# Patient Record
Sex: MALE | Race: WHITE | NOT HISPANIC OR LATINO | URBAN - METROPOLITAN AREA
[De-identification: names, ages, dates, MRNs, and addresses within clinical notes are randomized per-mention and may not be internally consistent; named-entity substitution may affect disease eponyms.]

---

## 2024-06-05 ENCOUNTER — APPOINTMENT (OUTPATIENT)
Dept: RADIOLOGY | Facility: MEDICAL CENTER | Age: 39
DRG: 519 | End: 2024-06-05
Attending: EMERGENCY MEDICINE
Payer: COMMERCIAL

## 2024-06-05 ENCOUNTER — APPOINTMENT (OUTPATIENT)
Dept: RADIOLOGY | Facility: MEDICAL CENTER | Age: 39
DRG: 519 | End: 2024-06-05
Attending: NEUROLOGICAL SURGERY
Payer: COMMERCIAL

## 2024-06-05 ENCOUNTER — HOSPITAL ENCOUNTER (INPATIENT)
Facility: MEDICAL CENTER | Age: 39
LOS: 10 days | DRG: 519 | End: 2024-06-15
Attending: EMERGENCY MEDICINE | Admitting: SURGERY
Payer: COMMERCIAL

## 2024-06-05 DIAGNOSIS — S12.600A CLOSED DISPLACED FRACTURE OF SEVENTH CERVICAL VERTEBRA, UNSPECIFIED FRACTURE MORPHOLOGY, INITIAL ENCOUNTER (HCC): ICD-10-CM

## 2024-06-05 DIAGNOSIS — S09.90XA CLOSED HEAD INJURY, INITIAL ENCOUNTER: ICD-10-CM

## 2024-06-05 DIAGNOSIS — T14.90XA TRAUMA: ICD-10-CM

## 2024-06-05 DIAGNOSIS — S01.01XA SCALP LACERATION, INITIAL ENCOUNTER: ICD-10-CM

## 2024-06-05 DIAGNOSIS — I82.402 ACUTE DEEP VEIN THROMBOSIS (DVT) OF LEFT LOWER EXTREMITY, UNSPECIFIED VEIN (HCC): ICD-10-CM

## 2024-06-05 PROBLEM — S22.41XA FRACTURE THREE RIBS-CLOSED, RIGHT, INITIAL ENCOUNTER: Status: ACTIVE | Noted: 2024-06-05

## 2024-06-05 PROBLEM — Z53.09 CONTRAINDICATION TO DEEP VEIN THROMBOSIS (DVT) PROPHYLAXIS: Status: ACTIVE | Noted: 2024-06-05

## 2024-06-05 PROBLEM — S01.91XA LACERATION OF HEAD: Status: ACTIVE | Noted: 2024-06-05

## 2024-06-05 LAB
ABO + RH BLD: NORMAL
ABO GROUP BLD: NORMAL
ALBUMIN SERPL BCP-MCNC: 4.4 G/DL (ref 3.2–4.9)
ALBUMIN/GLOB SERPL: 1.8 G/DL
ALP SERPL-CCNC: 83 U/L (ref 30–99)
ALT SERPL-CCNC: 9 U/L (ref 2–50)
AMPHET UR QL SCN: POSITIVE
ANION GAP SERPL CALC-SCNC: 15 MMOL/L (ref 7–16)
APTT PPP: 21.4 SEC (ref 24.7–36)
AST SERPL-CCNC: 17 U/L (ref 12–45)
BARBITURATES UR QL SCN: NEGATIVE
BENZODIAZ UR QL SCN: NEGATIVE
BILIRUB SERPL-MCNC: 0.4 MG/DL (ref 0.1–1.5)
BLD GP AB SCN SERPL QL: NORMAL
BUN SERPL-MCNC: 9 MG/DL (ref 8–22)
BZE UR QL SCN: POSITIVE
CALCIUM ALBUM COR SERPL-MCNC: 8.3 MG/DL (ref 8.5–10.5)
CALCIUM SERPL-MCNC: 8.6 MG/DL (ref 8.5–10.5)
CANNABINOIDS UR QL SCN: POSITIVE
CHLORIDE SERPL-SCNC: 105 MMOL/L (ref 96–112)
CO2 SERPL-SCNC: 20 MMOL/L (ref 20–33)
CREAT SERPL-MCNC: 0.96 MG/DL (ref 0.5–1.4)
ERYTHROCYTE [DISTWIDTH] IN BLOOD BY AUTOMATED COUNT: 42.2 FL (ref 35.9–50)
ETHANOL BLD-MCNC: <10.1 MG/DL
FENTANYL UR QL: NEGATIVE
GFR SERPLBLD CREATININE-BSD FMLA CKD-EPI: 103 ML/MIN/1.73 M 2
GLOBULIN SER CALC-MCNC: 2.5 G/DL (ref 1.9–3.5)
GLUCOSE SERPL-MCNC: 91 MG/DL (ref 65–99)
HCT VFR BLD AUTO: 40.8 % (ref 42–52)
HGB BLD-MCNC: 14.5 G/DL (ref 14–18)
INR PPP: 1.06 (ref 0.87–1.13)
MCH RBC QN AUTO: 32.7 PG (ref 27–33)
MCHC RBC AUTO-ENTMCNC: 35.5 G/DL (ref 32.3–36.5)
MCV RBC AUTO: 91.9 FL (ref 81.4–97.8)
METHADONE UR QL SCN: NEGATIVE
OPIATES UR QL SCN: POSITIVE
OXYCODONE UR QL SCN: NEGATIVE
PCP UR QL SCN: NEGATIVE
PLATELET # BLD AUTO: 248 K/UL (ref 164–446)
PMV BLD AUTO: 9.6 FL (ref 9–12.9)
POTASSIUM SERPL-SCNC: 3.8 MMOL/L (ref 3.6–5.5)
PROPOXYPH UR QL SCN: NEGATIVE
PROT SERPL-MCNC: 6.9 G/DL (ref 6–8.2)
PROTHROMBIN TIME: 13.9 SEC (ref 12–14.6)
RBC # BLD AUTO: 4.44 M/UL (ref 4.7–6.1)
RH BLD: NORMAL
SODIUM SERPL-SCNC: 140 MMOL/L (ref 135–145)
WBC # BLD AUTO: 7.8 K/UL (ref 4.8–10.8)

## 2024-06-05 PROCEDURE — 85610 PROTHROMBIN TIME: CPT

## 2024-06-05 PROCEDURE — G0390 TRAUMA RESPONS W/HOSP CRITI: HCPCS

## 2024-06-05 PROCEDURE — 700105 HCHG RX REV CODE 258: Performed by: NURSE PRACTITIONER

## 2024-06-05 PROCEDURE — 72125 CT NECK SPINE W/O DYE: CPT

## 2024-06-05 PROCEDURE — 700105 HCHG RX REV CODE 258: Performed by: SURGERY

## 2024-06-05 PROCEDURE — 80307 DRUG TEST PRSMV CHEM ANLYZR: CPT

## 2024-06-05 PROCEDURE — 304217 HCHG IRRIGATION SYSTEM

## 2024-06-05 PROCEDURE — 71045 X-RAY EXAM CHEST 1 VIEW: CPT

## 2024-06-05 PROCEDURE — 90715 TDAP VACCINE 7 YRS/> IM: CPT | Performed by: EMERGENCY MEDICINE

## 2024-06-05 PROCEDURE — 96375 TX/PRO/DX INJ NEW DRUG ADDON: CPT

## 2024-06-05 PROCEDURE — 96374 THER/PROPH/DIAG INJ IV PUSH: CPT

## 2024-06-05 PROCEDURE — 700111 HCHG RX REV CODE 636 W/ 250 OVERRIDE (IP): Mod: JZ | Performed by: EMERGENCY MEDICINE

## 2024-06-05 PROCEDURE — 305308 HCHG STAPLER,SKIN,DISP.

## 2024-06-05 PROCEDURE — 72131 CT LUMBAR SPINE W/O DYE: CPT

## 2024-06-05 PROCEDURE — 82077 ASSAY SPEC XCP UR&BREATH IA: CPT

## 2024-06-05 PROCEDURE — 99999 PR NO CHARGE: CPT | Performed by: REGISTERED NURSE

## 2024-06-05 PROCEDURE — 86901 BLOOD TYPING SEROLOGIC RH(D): CPT

## 2024-06-05 PROCEDURE — 36415 COLL VENOUS BLD VENIPUNCTURE: CPT

## 2024-06-05 PROCEDURE — 85730 THROMBOPLASTIN TIME PARTIAL: CPT

## 2024-06-05 PROCEDURE — 72170 X-RAY EXAM OF PELVIS: CPT

## 2024-06-05 PROCEDURE — 70450 CT HEAD/BRAIN W/O DYE: CPT

## 2024-06-05 PROCEDURE — 770022 HCHG ROOM/CARE - ICU (200)

## 2024-06-05 PROCEDURE — 99291 CRITICAL CARE FIRST HOUR: CPT

## 2024-06-05 PROCEDURE — 700117 HCHG RX CONTRAST REV CODE 255: Performed by: EMERGENCY MEDICINE

## 2024-06-05 PROCEDURE — 71260 CT THORAX DX C+: CPT

## 2024-06-05 PROCEDURE — 700102 HCHG RX REV CODE 250 W/ 637 OVERRIDE(OP): Performed by: NURSE PRACTITIONER

## 2024-06-05 PROCEDURE — 85027 COMPLETE CBC AUTOMATED: CPT

## 2024-06-05 PROCEDURE — 90471 IMMUNIZATION ADMIN: CPT

## 2024-06-05 PROCEDURE — 304999 HCHG REPAIR-SIMPLE/INTERMED LEVEL 1

## 2024-06-05 PROCEDURE — 86850 RBC ANTIBODY SCREEN: CPT

## 2024-06-05 PROCEDURE — 86900 BLOOD TYPING SEROLOGIC ABO: CPT

## 2024-06-05 PROCEDURE — 80053 COMPREHEN METABOLIC PANEL: CPT

## 2024-06-05 PROCEDURE — 72128 CT CHEST SPINE W/O DYE: CPT

## 2024-06-05 PROCEDURE — 99222 1ST HOSP IP/OBS MODERATE 55: CPT | Performed by: SURGERY

## 2024-06-05 PROCEDURE — A9270 NON-COVERED ITEM OR SERVICE: HCPCS | Performed by: NURSE PRACTITIONER

## 2024-06-05 PROCEDURE — 73620 X-RAY EXAM OF FOOT: CPT | Mod: LT

## 2024-06-05 PROCEDURE — 0HQ0XZZ REPAIR SCALP SKIN, EXTERNAL APPROACH: ICD-10-PCS | Performed by: EMERGENCY MEDICINE

## 2024-06-05 PROCEDURE — 700111 HCHG RX REV CODE 636 W/ 250 OVERRIDE (IP): Mod: JZ | Performed by: SURGERY

## 2024-06-05 PROCEDURE — 70498 CT ANGIOGRAPHY NECK: CPT

## 2024-06-05 RX ORDER — METOPROLOL TARTRATE 1 MG/ML
1 INJECTION, SOLUTION INTRAVENOUS
Status: DISCONTINUED | OUTPATIENT
Start: 2024-06-05 | End: 2024-06-05

## 2024-06-05 RX ORDER — HALOPERIDOL 5 MG/ML
1 INJECTION INTRAMUSCULAR
Status: DISCONTINUED | OUTPATIENT
Start: 2024-06-05 | End: 2024-06-05

## 2024-06-05 RX ORDER — GABAPENTIN 300 MG/1
300 CAPSULE ORAL ONCE
Status: DISCONTINUED | OUTPATIENT
Start: 2024-06-05 | End: 2024-06-05

## 2024-06-05 RX ORDER — HYDROMORPHONE HYDROCHLORIDE 1 MG/ML
0.5 INJECTION, SOLUTION INTRAMUSCULAR; INTRAVENOUS; SUBCUTANEOUS
Status: DISCONTINUED | OUTPATIENT
Start: 2024-06-05 | End: 2024-06-06

## 2024-06-05 RX ORDER — METAXALONE 800 MG/1
800 TABLET ORAL 3 TIMES DAILY
Status: DISCONTINUED | OUTPATIENT
Start: 2024-06-05 | End: 2024-06-15 | Stop reason: HOSPADM

## 2024-06-05 RX ORDER — HYDROMORPHONE HYDROCHLORIDE 1 MG/ML
0.4 INJECTION, SOLUTION INTRAMUSCULAR; INTRAVENOUS; SUBCUTANEOUS
Status: DISCONTINUED | OUTPATIENT
Start: 2024-06-05 | End: 2024-06-05

## 2024-06-05 RX ORDER — EPHEDRINE SULFATE 50 MG/ML
5 INJECTION, SOLUTION INTRAVENOUS
Status: DISCONTINUED | OUTPATIENT
Start: 2024-06-05 | End: 2024-06-05

## 2024-06-05 RX ORDER — AMOXICILLIN 250 MG
1 CAPSULE ORAL
Status: DISCONTINUED | OUTPATIENT
Start: 2024-06-05 | End: 2024-06-15 | Stop reason: HOSPADM

## 2024-06-05 RX ORDER — MIDAZOLAM HYDROCHLORIDE 1 MG/ML
1 INJECTION INTRAMUSCULAR; INTRAVENOUS
Status: DISCONTINUED | OUTPATIENT
Start: 2024-06-05 | End: 2024-06-05

## 2024-06-05 RX ORDER — OXYCODONE HCL 10 MG/1
10 TABLET, FILM COATED, EXTENDED RELEASE ORAL ONCE
Status: DISCONTINUED | OUTPATIENT
Start: 2024-06-05 | End: 2024-06-05

## 2024-06-05 RX ORDER — GABAPENTIN 300 MG/1
300 CAPSULE ORAL 2 TIMES DAILY
COMMUNITY

## 2024-06-05 RX ORDER — POLYETHYLENE GLYCOL 3350 17 G/17G
1 POWDER, FOR SOLUTION ORAL 2 TIMES DAILY
Status: DISCONTINUED | OUTPATIENT
Start: 2024-06-05 | End: 2024-06-15 | Stop reason: HOSPADM

## 2024-06-05 RX ORDER — ACETAMINOPHEN 500 MG
1000 TABLET ORAL ONCE
Status: DISCONTINUED | OUTPATIENT
Start: 2024-06-05 | End: 2024-06-05

## 2024-06-05 RX ORDER — HYDROMORPHONE HYDROCHLORIDE 1 MG/ML
0.2 INJECTION, SOLUTION INTRAMUSCULAR; INTRAVENOUS; SUBCUTANEOUS
Status: DISCONTINUED | OUTPATIENT
Start: 2024-06-05 | End: 2024-06-05

## 2024-06-05 RX ORDER — BISACODYL 10 MG
10 SUPPOSITORY, RECTAL RECTAL
Status: DISCONTINUED | OUTPATIENT
Start: 2024-06-05 | End: 2024-06-15 | Stop reason: HOSPADM

## 2024-06-05 RX ORDER — AMOXICILLIN 250 MG
1 CAPSULE ORAL NIGHTLY
Status: DISCONTINUED | OUTPATIENT
Start: 2024-06-05 | End: 2024-06-15 | Stop reason: HOSPADM

## 2024-06-05 RX ORDER — OXYCODONE HYDROCHLORIDE 10 MG/1
10 TABLET ORAL
Status: DISCONTINUED | OUTPATIENT
Start: 2024-06-05 | End: 2024-06-06

## 2024-06-05 RX ORDER — ONDANSETRON 2 MG/ML
4 INJECTION INTRAMUSCULAR; INTRAVENOUS
Status: DISCONTINUED | OUTPATIENT
Start: 2024-06-05 | End: 2024-06-05

## 2024-06-05 RX ORDER — DIPHENHYDRAMINE HYDROCHLORIDE 50 MG/ML
12.5 INJECTION INTRAMUSCULAR; INTRAVENOUS
Status: DISCONTINUED | OUTPATIENT
Start: 2024-06-05 | End: 2024-06-05

## 2024-06-05 RX ORDER — LABETALOL HYDROCHLORIDE 5 MG/ML
5 INJECTION, SOLUTION INTRAVENOUS
Status: DISCONTINUED | OUTPATIENT
Start: 2024-06-05 | End: 2024-06-05

## 2024-06-05 RX ORDER — HYDRALAZINE HYDROCHLORIDE 20 MG/ML
5 INJECTION INTRAMUSCULAR; INTRAVENOUS
Status: DISCONTINUED | OUTPATIENT
Start: 2024-06-05 | End: 2024-06-05

## 2024-06-05 RX ORDER — GABAPENTIN 100 MG/1
100 CAPSULE ORAL EVERY 8 HOURS
Status: DISCONTINUED | OUTPATIENT
Start: 2024-06-05 | End: 2024-06-06

## 2024-06-05 RX ORDER — ONDANSETRON 2 MG/ML
4 INJECTION INTRAMUSCULAR; INTRAVENOUS EVERY 4 HOURS PRN
Status: DISCONTINUED | OUTPATIENT
Start: 2024-06-05 | End: 2024-06-15 | Stop reason: HOSPADM

## 2024-06-05 RX ORDER — ONDANSETRON 2 MG/ML
4 INJECTION INTRAMUSCULAR; INTRAVENOUS ONCE
Status: COMPLETED | OUTPATIENT
Start: 2024-06-05 | End: 2024-06-05

## 2024-06-05 RX ORDER — OXYCODONE HCL 5 MG/5 ML
10 SOLUTION, ORAL ORAL
Status: DISCONTINUED | OUTPATIENT
Start: 2024-06-05 | End: 2024-06-05

## 2024-06-05 RX ORDER — ACETAMINOPHEN 500 MG
1000 TABLET ORAL EVERY 6 HOURS
Status: DISPENSED | OUTPATIENT
Start: 2024-06-05 | End: 2024-06-10

## 2024-06-05 RX ORDER — DOCUSATE SODIUM 100 MG/1
100 CAPSULE, LIQUID FILLED ORAL 2 TIMES DAILY
Status: DISCONTINUED | OUTPATIENT
Start: 2024-06-05 | End: 2024-06-15 | Stop reason: HOSPADM

## 2024-06-05 RX ORDER — SODIUM CHLORIDE, SODIUM LACTATE, POTASSIUM CHLORIDE, CALCIUM CHLORIDE 600; 310; 30; 20 MG/100ML; MG/100ML; MG/100ML; MG/100ML
INJECTION, SOLUTION INTRAVENOUS CONTINUOUS
Status: DISCONTINUED | OUTPATIENT
Start: 2024-06-05 | End: 2024-06-05

## 2024-06-05 RX ORDER — MEPERIDINE HYDROCHLORIDE 25 MG/ML
12.5 INJECTION INTRAMUSCULAR; INTRAVENOUS; SUBCUTANEOUS
Status: DISCONTINUED | OUTPATIENT
Start: 2024-06-05 | End: 2024-06-05

## 2024-06-05 RX ORDER — TRAZODONE HYDROCHLORIDE 150 MG/1
150 TABLET ORAL NIGHTLY
COMMUNITY

## 2024-06-05 RX ORDER — HYDROMORPHONE HYDROCHLORIDE 1 MG/ML
0.1 INJECTION, SOLUTION INTRAMUSCULAR; INTRAVENOUS; SUBCUTANEOUS
Status: DISCONTINUED | OUTPATIENT
Start: 2024-06-05 | End: 2024-06-05

## 2024-06-05 RX ORDER — OXYCODONE HCL 5 MG/5 ML
5 SOLUTION, ORAL ORAL
Status: DISCONTINUED | OUTPATIENT
Start: 2024-06-05 | End: 2024-06-05

## 2024-06-05 RX ORDER — ONDANSETRON 4 MG/1
4 TABLET, ORALLY DISINTEGRATING ORAL EVERY 4 HOURS PRN
Status: DISCONTINUED | OUTPATIENT
Start: 2024-06-05 | End: 2024-06-15 | Stop reason: HOSPADM

## 2024-06-05 RX ORDER — ACETAMINOPHEN 500 MG
1000 TABLET ORAL EVERY 6 HOURS PRN
Status: DISCONTINUED | OUTPATIENT
Start: 2024-06-10 | End: 2024-06-12

## 2024-06-05 RX ORDER — BACITRACIN ZINC 500 [USP'U]/G
OINTMENT TOPICAL 2 TIMES DAILY
Status: DISCONTINUED | OUTPATIENT
Start: 2024-06-05 | End: 2024-06-08

## 2024-06-05 RX ORDER — MORPHINE SULFATE 4 MG/ML
4 INJECTION INTRAVENOUS ONCE
Status: COMPLETED | OUTPATIENT
Start: 2024-06-05 | End: 2024-06-05

## 2024-06-05 RX ORDER — SODIUM CHLORIDE 9 MG/ML
INJECTION, SOLUTION INTRAVENOUS CONTINUOUS
Status: DISCONTINUED | OUTPATIENT
Start: 2024-06-05 | End: 2024-06-06

## 2024-06-05 RX ORDER — OXYCODONE HYDROCHLORIDE 5 MG/1
5 TABLET ORAL
Status: DISCONTINUED | OUTPATIENT
Start: 2024-06-05 | End: 2024-06-06

## 2024-06-05 RX ORDER — ENEMA 19; 7 G/133ML; G/133ML
1 ENEMA RECTAL
Status: DISCONTINUED | OUTPATIENT
Start: 2024-06-05 | End: 2024-06-15 | Stop reason: HOSPADM

## 2024-06-05 RX ADMIN — MORPHINE SULFATE 4 MG: 4 INJECTION INTRAVENOUS at 02:22

## 2024-06-05 RX ADMIN — ACETAMINOPHEN 1000 MG: 500 TABLET, FILM COATED ORAL at 23:27

## 2024-06-05 RX ADMIN — METAXALONE 800 MG: 800 TABLET ORAL at 17:01

## 2024-06-05 RX ADMIN — DOCUSATE SODIUM 100 MG: 100 CAPSULE, LIQUID FILLED ORAL at 05:45

## 2024-06-05 RX ADMIN — OXYCODONE HYDROCHLORIDE 10 MG: 10 TABLET ORAL at 23:27

## 2024-06-05 RX ADMIN — GABAPENTIN 100 MG: 100 CAPSULE ORAL at 23:27

## 2024-06-05 RX ADMIN — OXYCODONE HYDROCHLORIDE 5 MG: 5 TABLET ORAL at 16:13

## 2024-06-05 RX ADMIN — ACETAMINOPHEN 1000 MG: 500 TABLET, FILM COATED ORAL at 05:45

## 2024-06-05 RX ADMIN — ACETAMINOPHEN 1000 MG: 500 TABLET, FILM COATED ORAL at 17:01

## 2024-06-05 RX ADMIN — CLOSTRIDIUM TETANI TOXOID ANTIGEN (FORMALDEHYDE INACTIVATED), CORYNEBACTERIUM DIPHTHERIAE TOXOID ANTIGEN (FORMALDEHYDE INACTIVATED), BORDETELLA PERTUSSIS TOXOID ANTIGEN (GLUTARALDEHYDE INACTIVATED), BORDETELLA PERTUSSIS FILAMENTOUS HEMAGGLUTININ ANTIGEN (FORMALDEHYDE INACTIVATED), BORDETELLA PERTUSSIS PERTACTIN ANTIGEN, AND BORDETELLA PERTUSSIS FIMBRIAE 2/3 ANTIGEN 0.5 ML: 5; 2; 2.5; 5; 3; 5 INJECTION, SUSPENSION INTRAMUSCULAR at 02:22

## 2024-06-05 RX ADMIN — SODIUM CHLORIDE: 9 INJECTION, SOLUTION INTRAVENOUS at 17:56

## 2024-06-05 RX ADMIN — METAXALONE 800 MG: 800 TABLET ORAL at 05:45

## 2024-06-05 RX ADMIN — GABAPENTIN 100 MG: 100 CAPSULE ORAL at 05:45

## 2024-06-05 RX ADMIN — ONDANSETRON 4 MG: 2 INJECTION INTRAMUSCULAR; INTRAVENOUS at 02:21

## 2024-06-05 RX ADMIN — SENNOSIDES AND DOCUSATE SODIUM 1 TABLET: 50; 8.6 TABLET ORAL at 05:45

## 2024-06-05 RX ADMIN — OXYCODONE HYDROCHLORIDE 10 MG: 10 TABLET ORAL at 05:45

## 2024-06-05 RX ADMIN — CALCIUM CHLORIDE 1000 MG: 100 INJECTION, SOLUTION INTRAVENOUS at 22:52

## 2024-06-05 RX ADMIN — IOHEXOL 100 ML: 350 INJECTION, SOLUTION INTRAVENOUS at 02:45

## 2024-06-05 RX ADMIN — SODIUM CHLORIDE: 9 INJECTION, SOLUTION INTRAVENOUS at 05:39

## 2024-06-05 ASSESSMENT — ENCOUNTER SYMPTOMS
PSYCHIATRIC NEGATIVE: 1
RESPIRATORY NEGATIVE: 1
CONSTITUTIONAL NEGATIVE: 1
FOCAL WEAKNESS: 0
BLURRED VISION: 0
DOUBLE VISION: 0
NEUROLOGICAL NEGATIVE: 1
DIZZINESS: 0
GASTROINTESTINAL NEGATIVE: 1
MYALGIAS: 1
EYES NEGATIVE: 1
CARDIOVASCULAR NEGATIVE: 1
NECK PAIN: 1

## 2024-06-05 ASSESSMENT — COPD QUESTIONNAIRES
DO YOU EVER COUGH UP ANY MUCUS OR PHLEGM?: NO/ONLY WITH OCCASIONAL COLDS OR INFECTIONS
COPD SCREENING SCORE: 2
DURING THE PAST 4 WEEKS HOW MUCH DID YOU FEEL SHORT OF BREATH: NONE/LITTLE OF THE TIME
HAVE YOU SMOKED AT LEAST 100 CIGARETTES IN YOUR ENTIRE LIFE: YES

## 2024-06-05 ASSESSMENT — PAIN DESCRIPTION - PAIN TYPE
TYPE: ACUTE PAIN

## 2024-06-05 ASSESSMENT — FIBROSIS 4 INDEX: FIB4 SCORE: 0.89
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
## 2024-06-05 NOTE — PROGRESS NOTES
Patient arrives to T910 with ACLS RN and CCT    HR 74  /58  O2 96% 1L NC  RR 24  86.3Kg  98.0F    4 Eyes Skin Assessment Completed by [place] RN and RASHAUN Cabrera.    Head: staples to top of head, avulsion, dried blood on face   Ears WDL  Nose WDL  Mouth WDL  Neck: c-collar in place with unstable spine- unable to remove device at this time   Breast/Chest WDL  Shoulder Blades: abrasions to right shoulder, abrasions across upper back/shoulder blades  Spine WDL  (R) Arm/Elbow/Hand: hand abrasions   (L) Arm/Elbow/Hand: left elbow abrasion, hand abrasion  Abdomen WDL  Groin WDL  Scrotum/Coccyx/Buttocks: small indentation and redness that is questionable blanching on right posterior thigh. When turning patient there was a saline syringe under patients thigh  (R) Leg: abrasions to knee   (L) Leg: abrasion to knees   (R) Heel/Foot/Toe: abrasions and open wounds to feet/toes   (L) Heel/Foot/Toe: abrasions and open wounds to feet/                                Devices In Places ECG, Blood Pressure Cuff, Pulse Ox, and SCD's, PIVs, c-collar      Interventions In Place Gray Ear Foams, Sacral Mepilex, Pillows, Q2 Turns, and Pressure Redistribution Mattress    Possible Skin Injury Yes    Pictures Uploaded Into Epic Yes  Wound Consult Placed Yes  RN Wound Prevention Protocol Ordered Yes    [facility]: [facility]   Black tshirt  Grey shorts  Boxers  Passport
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
## 2024-06-05 NOTE — PROGRESS NOTES
1330 Patient back from pre-op. No surgical intervention d/t hemodynamic instability.    4 Eyes Skin Assessment Completed by RASHAUN Lai and RASHAUN Toro.    Head Redness lac with staples on superior head, avulsion  Ears WDL  Nose WDL  Mouth WDL  Neck ASHA d/t c-collar in place and unstable spinal fx  Breast/Chest Weeping  Shoulder Blades Redness abrasions bilateral shoulders  Spine WDL  (R) Arm/Elbow/Hand Redness and Abrasion to hand  (L) Arm/Elbow/Hand Redness and Abrasion elbow and hand  Abdomen WDL  Groin WDL  Scrotum/Coccyx/Buttocks Redness and Blanching  (R) Leg Redness, Blanching, and Abrasion knees  (L) Leg Redness, Blanching, and Abrasion knees  (R) Heel/Foot/Toe Redness, Blanching, Non-Blanching, and Scab abrasions and open wounds to feet/toes  (L) Heel/Foot/Toe Redness, Blanching, Non-Blanching, and Bruising abrasions and open wounds to feet/toes          Devices In Places ECG, Blood Pressure Cuff, Pulse Ox, and SCD's      Interventions In Place Gray Ear Foams, Sacral Mepilex, Pillows, and Q2 Turns    Possible Skin Injury Yes    Pictures Uploaded Into Epic N/A  Wound Consult Placed N/A  RN Wound Prevention Protocol Ordered No
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
## 2024-06-05 NOTE — THERAPY
Physical Therapy Contact Note    Patient Name: Salvador Romo  Age:  39 y.o., Sex:  male  Medical Record #: [id number]  Today's Date: 6/5/2024 06/05/24 1050   Interdisciplinary Plan of Care Collaboration   IDT Collaboration with  Nursing   Collaboration Comments PT order received. Pt is pending sx today for spine. PT will follow up post op as appropriate.     Agustina Kennedy PT, DPT
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
## 2024-06-05 NOTE — PROGRESS NOTES
Surgery cancelled due to poly-illicit drugs on board making surgery/anesthesia dangerous.  Discussed this with patient's mother by phone ([phone], Diane)

## 2024-06-05 NOTE — THERAPY
Speech Language Therapy Contact Note    Patient Name: Salvador Romo  Age:  39 y.o., Sex:  male  Medical Record #: [id number]  Today's Date: 6/5/2024 06/05/24 0930   Treatment Variance   Reason For Missed Therapy Medical - Other (Please Comment)   Initial Contact Note    Initial Contact Note  Order Received and Verified, Speech Therapy Evaluation in Progress with Full Report to Follow.   Interdisciplinary Plan of Care Collaboration   IDT Collaboration with  Other (See Comments)  (EMR)   Collaboration Comments Per EMR - patient is scheduled for surgery later today. SLP to follow to complete cognitive evaluation as appropriate and as schedule allows post-procedually.     - Isabela Estrada, SLP

## 2024-06-05 NOTE — PROGRESS NOTES
Patient stating that his biggest prioirty for today is to try and locate his cell phone so he can contact his friend, Rox, to try and check in on his two puppies

## 2024-06-05 NOTE — ASSESSMENT & PLAN NOTE
subtle anterior right fourth through sixth rib fractures.  Aggressive multimodal pain management, and pulmonary hygiene. Serial chest radiographs.

## 2024-06-05 NOTE — PROGRESS NOTES
Surgery canceled today secondary to some hemodynamic lability in the setting of polysubstance drug use.  Patient will have a diet.  Continue cervical collar at all times.  Surgery timing to be determined by neurosurgery.  Ongoing observation in the intensive care unit.

## 2024-06-05 NOTE — H&P
TRAUMA HISTORY AND PHYSICAL    CHIEF COMPLAINT: . Trauma green report fall    HISTORY OF PRESENT ILLNESS: Jac Romo 79-year-old male report fall down embankment .  Jac Romo is awake and interactive   Protecting airway   No respiratory distress bilateral breath sounds  Skin warm brisk cap refill palpable pulse no active bleeding maintaining adequate blood pressure and heart rate  Patient reports he does not recall what happened to him.   He states no intent to hurt himself.   He states no headache.  He reports normal vision.  He reports neck pain.  He states numbness and tingling thumb and index finger.   He states no chest pain or difficulty breathing.   He reports no abdominal pain.   He reports pain feet.     The patient was triaged as a Trauma Green in accordance with established pre hospital protocols. An expeditious primary and secondary survey with required adjuncts was conducted. See Trauma Narrator for full details.    PAST MEDICAL HISTORY:  has no past medical history on file.     PAST SURGICAL HISTORY:  has no past surgical history on file.    ALLERGIES: No Known Allergies   CURRENT MEDICATIONS:    Home Medications       Reviewed by Sunita Villegas R.N. (Registered Nurse) on 06/05/24 at 0144  Med List Status: Partial     Medication Last Dose Status        Patient Chace Taking any Medications                         Audit from Redirected Encounters    **Home medications have not yet been reviewed for this encounter**       FAMILY HISTORY: family history is not on file.    SOCIAL HISTORY:  reports current alcohol use. He reports current drug use.    REVIEW OF SYSTEMS:  Is negative with the exception of the aforementioned details in the history of present illness, past medical history, and past surgical history in accordance with CMS guidelines.    PHYSICAL EXAMINATION:     CONSTITUTIONAL:     Vital Signs: BP 98/54   Pulse 76   Temp 36.7 °C (98 °F) (Temporal)   Resp 15   Ht 1.829 m (6')   Wt 84  kg (185 lb 3 oz)   SpO2 96%    General Appearance: appears stated age, is in no apparent distress.  HEENT:    Scalp injury repaired by emergency department  No facial tenderness no bleeding ears nose or mouth  NECK:    The cervical spine is immobilized with a collar. The trachea is midline. There is no jugulovenous distention or cervical crepitance.   RESPIRATORY:   Inspection: Unlabored respirations, no intercostal retractions, paradoxical motion, or accessory muscle use.     CARDIOVASCULAR:   Brisk capillary refill palpable pulse  ABDOMEN:   Soft nontender nondistended no guarding no rebound  MUSCULOSKELETAL:   The pelvis is stable.  No significant angulation, deformity, or soft tissue injury involving the upper and lower extremities. Normal range of motion.  Abrasions feet knee    SKIN:    Appropriate color and temperature.  NEUROLOGIC:    GCS 14.  Oriented to self .  PSYCHIATRIC:   Unable to assess.    LABORATORY VALUES:   Recent Labs     06/05/24  0155   WBC 7.8   RBC 4.44*   HEMOGLOBIN 14.5   HEMATOCRIT 40.8*   MCV 91.9   MCH 32.7   MCHC 35.5   RDW 42.2   PLATELETCT 248   MPV 9.6     Recent Labs     06/05/24  0155   SODIUM 140   POTASSIUM 3.8   CHLORIDE 105   CO2 20   GLUCOSE 91   BUN 9   CREATININE 0.96   CALCIUM 8.6     Recent Labs     06/05/24  0155   ASTSGOT 17   ALTSGPT 9   TBILIRUBIN 0.4   ALKPHOSPHAT 83   GLOBULIN 2.5   INR 1.06     Recent Labs     06/05/24  0155   APTT 21.4*   INR 1.06        IMAGING:   CT-LSPINE W/O PLUS RECONS   Final Result         1.  No acute traumatic bony injury of the lumbar spine.      CT-TSPINE W/O PLUS RECONS   Final Result         1.  No acute traumatic bony injury of the thoracic spine.      CT-CTA NECK WITH & W/O-POST PROCESSING   Final Result         1.  CT angiogram of the neck within normal limits.         CT-CHEST,ABDOMEN,PELVIS WITH   Final Result         1.  Possible subtle anterior right fourth through sixth rib fractures   2.  Small fat-containing bilateral  inguinal hernias   3.  Small fat-containing umbilical hernia   4.  Diverticulosis      CT-CSPINE WITHOUT PLUS RECONS   Final Result         1.  Left C7 superior facet fracture with jumped facet at C6/C7.   2.  Recommend follow-up CT angiogram of the neck to evaluate for vertebral artery injury   3.  2.7 mm anterolisthesis C6 on C7      These findings were discussed with the patient's clinician, Josiah Carbajal Ii, on 6/5/2024 2:00 AM.      CT-HEAD W/O   Final Result         1.  No acute intracranial abnormality.         DX-CHEST-LIMITED (1 VIEW)   Final Result         1.  No acute cardiopulmonary disease.      DX-FOOT-2- LEFT   Final Result         1.  No acute traumatic bony injury.      DX-PELVIS-1 OR 2 VIEWS   Final Result         1.  No acute traumatic bony injury.      DX-PORTABLE FLUORO > 1 HOUR    (Results Pending)   DX-CERVICAL SPINE-2 OR 3 VIEWS    (Results Pending)   DX-O-ARM    (Results Pending)       IMPRESSION AND PLAN:     Active Hospital Problems    Diagnosis     Closed fracture of seventh cervical vertebra (HCC) [S12.600A]      Priority: High    Contraindication to deep vein thrombosis (DVT) prophylaxis [Z53.09]      Priority: Medium    Fracture three ribs-closed, right, initial encounter [S22.41XA]      Priority: Medium    Trauma [T14.90XA]      Priority: Low    Laceration of head [S01.91XA]      Priority: Low       DISPOSITION:  Trauma ICU.  Continue spine precautions  Follow-up neurosurgical evaluation recommendations    Pain control  Provide encouragement and support   monitor neurostatus and comfort level  Adjust medications and comfort measures as needed    Card   monitor for signs of bleeding  Continue to monitor to maintain adequate HR and BP  Follow up labs    Pulm  continue aggressive pulmonary hygiene  Encourage cough deep breath, IS  scd dvt prohylaxis    GI  N.p.o. pending completion evaluation  Bowel regime  Monitor abdominal exan    Renal  IV hydration  Monitor urine output, fluid  balance  Follow-up labs replace electrolytes as needed    Discussed findings and plan with patient  Discussed with ED physician     CRITICAL CARE TIME: 42  minutes excluding procedures.  ____________________________________   Cosmo Ruiz M.D.    DD: 6/5/2024  4:40 AM

## 2024-06-05 NOTE — ASSESSMENT & PLAN NOTE
Left C7 superior facet fracture with jumped facet at C6/C7.  2.7 mm anterolisthesis C6 on C7  CTA within normal limits.  6/12 Left cervical 6/7 facetectomy. Hemovac in place.  6/13 Hemovac discontinued.  Cervical immobilization. Ok to mobilize with collar securely on.    Mitch Leon MD, PhD. Neurosurgeon. [facility].

## 2024-06-05 NOTE — ED PROVIDER NOTES
ER Provider Note    Scribed for Josiah Carbajal Ii, M.d. by Jeff Dozier. 6/5/2024  1:40 AM    Primary Care Provider: No primary care provider noted.    CHIEF COMPLAINT   Chief Complaint   Patient presents with    Trauma Green     Pt BIB EMS from [facility] as trauma Green, pt jumped off golf embankment roughly 10' high. Was seen by Fire and initially AMA, but sister called later due to pt having blurry vision and nausea, A&Ox3, GCS 14.   8cm Laceration to head. Abrasions to left foot.   Unknown LOC. (+) head trauma, (-) thinners.         HPI/ROS  LIMITATION TO HISTORY   Select: : None  OUTSIDE HISTORIAN(S):  EMS present in trauma bay.     Violeta Mondragon is a 89 y.o. adult who presents to the ED via EMS for evaluation of a fall with signs of head trauma. The patient fell off the golf embankment at the [facility] around 10 feet.  He was seen by EMS and then declined transport.  He later describes having blurry vision and feeling unwell, EMS was called back and he was agreeable to transport.  Unknown loss of consciousness and he is unable to recall the injury. EMS reports the patient was A&Ox 3 and GCS 15 on arrival. The patient reports marijuana and occasional cocaine use, but denies any drinking. EMS notes the patient took ketamine today. The patient is prescribed gabapentin, clonidine, trazodone, and Zoloft.     PAST MEDICAL HISTORY  Psychiatric problems    SURGICAL HISTORY  History reviewed. No pertinent surgical history.    FAMILY HISTORY  History reviewed. No pertinent family history.    SOCIAL HISTORY   reports current alcohol use. He reports current drug use.  See above    CURRENT MEDICATIONS  Gabapentin, clonidine, trazodone, Zoloft    ALLERGIES  Patient has no allergy information on record.    PHYSICAL EXAM  BP 99/59   Pulse 99   Temp 36.3 °C (97.4 °F)   Resp 16   Ht 1.829 m (6')   Wt 89.4 kg (197 lb)   SpO2 93%   BMI 26.72 kg/m²   Physical Exam  Vitals and nursing note reviewed.   HENT:      Head:      Comments:  8 cm laceration to the top of the head.      Nose: Nose normal.      Mouth/Throat:      Mouth: Mucous membranes are moist.   Eyes:      Extraocular Movements: Extraocular movements intact.      Conjunctiva/sclera: Conjunctivae normal.      Pupils: Pupils are equal, round, and reactive to light.   Neck:      Comments: No tenderness at midline neck, he says no pain with rotating his neck but he is not moving it for me on exam.  Cardiovascular:      Rate and Rhythm: Normal rate and regular rhythm.      Pulses: Normal pulses.   Pulmonary:      Effort: Pulmonary effort is normal.      Breath sounds: Normal breath sounds.   Abdominal:      General: There is no distension.      Tenderness: There is no abdominal tenderness.   Musculoskeletal:         General: No swelling or tenderness. Normal range of motion.      Comments: Abrasion to the anterior left toes and foot with some edema.  Also abrasions at the right foot and toes without bony tenderness or edema   Skin:     General: Skin is warm.   Neurological:      General: No focal deficit present.      Mental Status: He is alert.      Comments: Oriented to place and situation.  He does require some redirection during questioning.  Speech is clear.  Moving all extremities with normal strength.  No sensory deficits.   Psychiatric:      Comments: Labile mood      DIAGNOSTIC STUDIES    EKG/LABS  Results for orders placed or performed during the hospital encounter of 06/05/24   DIAGNOSTIC ALCOHOL   Result Value Ref Range    Diagnostic Alcohol <10.1 <10.1 mg/dL   Comp Metabolic Panel   Result Value Ref Range    Sodium 140 135 - 145 mmol/L    Potassium 3.8 3.6 - 5.5 mmol/L    Chloride 105 96 - 112 mmol/L    Co2 20 20 - 33 mmol/L    Anion Gap 15.0 7.0 - 16.0    Glucose 91 65 - 99 mg/dL    Bun 9 8 - 22 mg/dL    Creatinine 0.96 0.50 - 1.40 mg/dL    Calcium 8.6 8.5 - 10.5 mg/dL    Correct Calcium 8.3 (L) 8.5 - 10.5 mg/dL    AST(SGOT) 17 12 - 45 U/L    ALT(SGPT) 9 2 - 50 U/L     Alkaline Phosphatase 83 30 - 99 U/L    Total Bilirubin 0.4 0.1 - 1.5 mg/dL    Albumin 4.4 3.2 - 4.9 g/dL    Total Protein 6.9 6.0 - 8.2 g/dL    Globulin 2.5 1.9 - 3.5 g/dL    A-G Ratio 1.8 g/dL   CBC WITHOUT DIFFERENTIAL   Result Value Ref Range    WBC 7.8 4.8 - 10.8 K/uL    RBC 4.44 (L) 4.70 - 6.10 M/uL    Hemoglobin 14.5 14.0 - 18.0 g/dL    Hematocrit 40.8 (L) 42.0 - 52.0 %    MCV 91.9 81.4 - 97.8 fL    MCH 32.7 27.0 - 33.0 pg    MCHC 35.5 32.3 - 36.5 g/dL    RDW 42.2 35.9 - 50.0 fL    Platelet Count 248 164 - 446 K/uL    MPV 9.6 9.0 - 12.9 fL   Prothrombin Time   Result Value Ref Range    PT 13.9 12.0 - 14.6 sec    INR 1.06 0.87 - 1.13   APTT   Result Value Ref Range    APTT 21.4 (L) 24.7 - 36.0 sec   COD - Adult (Type and Screen)   Result Value Ref Range    ABO Grouping Only O     Rh Grouping Only POS     Antibody Screen-Cod NEG    ABO Rh Confirm   Result Value Ref Range    ABO Rh Confirm O POS    ESTIMATED GFR   Result Value Ref Range    GFR (CKD-EPI) 103 >60 mL/min/1.73 m 2   URINE DRUG SCREEN   Result Value Ref Range    Amphetamines Urine Positive (A) Negative    Barbiturates Negative Negative    Benzodiazepines Negative Negative    Cocaine Metabolite Positive (A) Negative    Fentanyl, Urine Negative Negative    Methadone Negative Negative    Opiates Positive (A) Negative    Oxycodone Negative Negative    Phencyclidine -Pcp Negative Negative    Propoxyphene Negative Negative    Cannabinoid Metab Positive (A) Negative      I have independently interpreted this EKG    RADIOLOGY/PROCEDURES   The attending emergency physician has independently interpreted the diagnostic imaging associated with this visit and am waiting the final reading from the radiologist.   My preliminary interpretation is a follows: No intracranial hemorrhage.  There is a fracture and facet at C7    Radiologist interpretation:  CT-LSPINE W/O PLUS RECONS   Final Result         1.  No acute traumatic bony injury of the lumbar spine.       CT-TSPINE W/O PLUS RECONS   Final Result         1.  No acute traumatic bony injury of the thoracic spine.      CT-CTA NECK WITH & W/O-POST PROCESSING   Final Result         1.  CT angiogram of the neck within normal limits.         CT-CHEST,ABDOMEN,PELVIS WITH   Final Result         1.  Possible subtle anterior right fourth through sixth rib fractures   2.  Small fat-containing bilateral inguinal hernias   3.  Small fat-containing umbilical hernia   4.  Diverticulosis      CT-CSPINE WITHOUT PLUS RECONS   Final Result         1.  Left C7 superior facet fracture with jumped facet at C6/C7.   2.  Recommend follow-up CT angiogram of the neck to evaluate for vertebral artery injury   3.  2.7 mm anterolisthesis C6 on C7      These findings were discussed with the patient's clinician, Josiah Carbajal Ii, on 6/5/2024 2:00 AM.      CT-HEAD W/O   Final Result         1.  No acute intracranial abnormality.         DX-CHEST-LIMITED (1 VIEW)   Final Result         1.  No acute cardiopulmonary disease.      DX-FOOT-2- LEFT   Final Result         1.  No acute traumatic bony injury.      DX-PELVIS-1 OR 2 VIEWS   Final Result         1.  No acute traumatic bony injury.      DX-PORTABLE FLUORO > 1 HOUR    (Results Pending)   DX-CERVICAL SPINE-2 OR 3 VIEWS    (Results Pending)   DX-O-ARM    (Results Pending)     Laceration Repair Procedure Note    Indication: Laceration    Procedure: The patient was placed in the appropriate position. The wound was minimally contaminated .The area was then irrigated with normal saline. The laceration was closed with staples. A second 3 cm laceration was closed with staples. The wound area was then dressed with bacitracin and a sterile dressing.      Total repaired wound length: 8 cm.     Other Items: Staple count: 5    The patient tolerated the procedure well.    Complications: None     COURSE & MEDICAL DECISION MAKING     ASSESSMENT, COURSE AND PLAN  Care Narrative: This is an evaluation of a  "39 y.o. man who presents to the ED as a trauma green after falling 10 feet from the golfing embankment at the [facility], striking his head. Unknown loss of consciousness, and he does not recall his injury. He has been experiencing nausea and blurry vision. On examination he has an 8 cm laceration to the top of the head. Ordered CT-head and CT C-spine without to evaluate for intracranial hemorrhage, C-spine fracture.  Decided to scan C-spine because he was not focused on participating in neck exam.  He seemed like he may be under the influence of substances.  Chest x-ray and pelvic x-ray were done in the trauma bay and did not reveal any traumatic injuries.  I independently reviewed the x-ray and pelvic x-ray there are no fractures or pneumothorax.  His abdomen is nontender.  X-ray of the left foot did not show fractures.  Level 2 trauma labs ordered.    2:00 AM - I was informed the patient has a C-spine fracture by our radiologist he recommended further imaging with CTA. Ordered CTA neck and additional imaging of the torso and spine to look for other potential injuries.  Still neurologically intact.    2:11 AM - The patient was reevaluated at bedside. Patient is still moving all extremities normally. Sister now present at bedside. Reports people at the [facility] told her later that he \"took a swan dive.\"  Discussed imaging with the patient and his sister including C-spine fracture. Informed them of my plan for additional imaging to evaluate for other abnormalities. Plan for conversation with Dr. Leon (Spinal surgery) pending return of imagining results.     3:30 AM - Reviewed imaging results. CT showed possible right 4-6 nondisplaced rib fractures. No other spine injuries. Paged spine surgery.  He will be placed in a [place] J collar.    3:44 AM I discussed the patient's case and the above findings with Dr. Leon (Spine Surgery) who said to keep the patient NPO and admit to trauma.  Paged trauma.     3:56 AM - I discussed " the patient's case and the above findings with Dr. Rm (Trauma) who agrees to evaluate the patient for hospitalization.    4:02 AM - Laceration repair performed at this time as noted above.       PROBLEM LIST  # C7 fracture with jump facet C6-C7   -N.p.o., to OR later today with Dr. Leon     # Closed head injury with scalp laceration   -Repaired with staples   -Tdap updated        DISPOSITION AND DISCUSSIONS  I have discussed management of the patient with the following physicians and SOM's:  Dr. Leon (Spine Surgery), Dr. Rm (Trauma)     Discussion of management with other [facility] or appropriate source(s): None     Barriers to care at this time, including but not limited to: Patient does not have established PCP.     DISPOSITION:  Patient will be hospitalized by Dr. Rm in guarded condition.     FINAL DIANGOSIS  1. Closed head injury, initial encounter    2. Closed displaced fracture of seventh cervical vertebra, unspecified fracture morphology, initial encounter ([facility])    3. Scalp laceration, initial encounter    +Laceration repair     IJeff (Scribe), am scribing for, and in the presence of, KIANA Ivey II.    Electronically signed by: Jeff Dozier (Scribe), 6/5/2024    IJosiah II, M* personally performed the services described in this documentation, as scribed by Jeff Dozier in my presence, and it is both accurate and complete.      The note accurately reflects work and decisions made by me.  Josiah Carbajal II, M.D.  6/5/2024  8:25 AM

## 2024-06-05 NOTE — ED NOTES
Pt BIB EMS from [facility] as trauma Green, pt jumped off golf embankment roughly 10' high. Was seen by Fire and initially AMA, but sister called later due to pt having blurry vision and nausea, A&Ox3, GCS 14.   8cm Laceration to head. Abrasions to left foot.   Unknown LOC. (+) head trauma, (-) thinners.

## 2024-06-05 NOTE — PROGRESS NOTES
"Patient asking to take mepilex off due to it feeling like a \"burning sensation.\" Education provided on importance of sacral offloading dressing to prevent pressure injuries. Patient refusing at this time to wear mepilex, but very agreeable to Q 2 hour turns for repositioning.   "

## 2024-06-05 NOTE — PROGRESS NOTES
EMS collar replaced with [place] J collar. ER Tech assist to maintain c-spine during replacement.

## 2024-06-05 NOTE — CARE PLAN
The patient is Watcher - Medium risk of patient condition declining or worsening    Shift Goals  Clinical Goals: Stable neuro, surgery  Patient Goals: Pain control  Family Goals: No family present    Progress made toward(s) clinical / shift goals:        Problem: Pain - Standard  Goal: Alleviation of pain or a reduction in pain to the patient’s comfort goal  Outcome: Progressing     Problem: Knowledge Deficit - Standard  Goal: Patient and family/care givers will demonstrate understanding of plan of care, disease process/condition, diagnostic tests and medications  Outcome: Progressing     Problem: Neuro Status  Goal: Neuro status will remain stable or improve  Outcome: Progressing       Patient is not progressing towards the following goals:

## 2024-06-06 ENCOUNTER — APPOINTMENT (OUTPATIENT)
Dept: RADIOLOGY | Facility: MEDICAL CENTER | Age: 39
DRG: 519 | End: 2024-06-06
Attending: REGISTERED NURSE
Payer: COMMERCIAL

## 2024-06-06 ENCOUNTER — APPOINTMENT (OUTPATIENT)
Dept: RADIOLOGY | Facility: MEDICAL CENTER | Age: 39
DRG: 519 | End: 2024-06-06
Attending: NURSE PRACTITIONER
Payer: COMMERCIAL

## 2024-06-06 PROBLEM — I82.402 ACUTE DEEP VEIN THROMBOSIS (DVT) OF LEFT LOWER EXTREMITY (HCC): Status: ACTIVE | Noted: 2024-06-06

## 2024-06-06 PROBLEM — Z78.9 NO CONTRAINDICATION TO DEEP VEIN THROMBOSIS (DVT) PROPHYLAXIS: Status: ACTIVE | Noted: 2024-06-05

## 2024-06-06 LAB
ALBUMIN SERPL BCP-MCNC: 3.3 G/DL (ref 3.2–4.9)
ALBUMIN/GLOB SERPL: 1.9 G/DL
ALP SERPL-CCNC: 75 U/L (ref 30–99)
ALT SERPL-CCNC: 9 U/L (ref 2–50)
ANION GAP SERPL CALC-SCNC: 10 MMOL/L (ref 7–16)
APTT PPP: 26.2 SEC (ref 24.7–36)
AST SERPL-CCNC: 14 U/L (ref 12–45)
BASOPHILS # BLD AUTO: 0.4 % (ref 0–1.8)
BASOPHILS # BLD: 0.02 K/UL (ref 0–0.12)
BILIRUB SERPL-MCNC: <0.2 MG/DL (ref 0.1–1.5)
BUN SERPL-MCNC: 13 MG/DL (ref 8–22)
CALCIUM ALBUM COR SERPL-MCNC: 8.6 MG/DL (ref 8.5–10.5)
CALCIUM SERPL-MCNC: 8 MG/DL (ref 8.5–10.5)
CHLORIDE SERPL-SCNC: 113 MMOL/L (ref 96–112)
CO2 SERPL-SCNC: 19 MMOL/L (ref 20–33)
CREAT SERPL-MCNC: 0.76 MG/DL (ref 0.5–1.4)
EOSINOPHIL # BLD AUTO: 0.14 K/UL (ref 0–0.51)
EOSINOPHIL NFR BLD: 2.5 % (ref 0–6.9)
ERYTHROCYTE [DISTWIDTH] IN BLOOD BY AUTOMATED COUNT: 44.6 FL (ref 35.9–50)
GFR SERPLBLD CREATININE-BSD FMLA CKD-EPI: 117 ML/MIN/1.73 M 2
GLOBULIN SER CALC-MCNC: 1.7 G/DL (ref 1.9–3.5)
GLUCOSE SERPL-MCNC: 113 MG/DL (ref 65–99)
HCT VFR BLD AUTO: 33.9 % (ref 42–52)
HGB BLD-MCNC: 11.8 G/DL (ref 14–18)
IMM GRANULOCYTES # BLD AUTO: 0.01 K/UL (ref 0–0.11)
IMM GRANULOCYTES NFR BLD AUTO: 0.2 % (ref 0–0.9)
INR PPP: 1.05 (ref 0.87–1.13)
LYMPHOCYTES # BLD AUTO: 2.43 K/UL (ref 1–4.8)
LYMPHOCYTES NFR BLD: 42.6 % (ref 22–41)
MAGNESIUM SERPL-MCNC: 1.8 MG/DL (ref 1.5–2.5)
MCH RBC QN AUTO: 33.1 PG (ref 27–33)
MCHC RBC AUTO-ENTMCNC: 34.8 G/DL (ref 32.3–36.5)
MCV RBC AUTO: 95 FL (ref 81.4–97.8)
MONOCYTES # BLD AUTO: 0.41 K/UL (ref 0–0.85)
MONOCYTES NFR BLD AUTO: 7.2 % (ref 0–13.4)
NEUTROPHILS # BLD AUTO: 2.69 K/UL (ref 1.82–7.42)
NEUTROPHILS NFR BLD: 47.1 % (ref 44–72)
NRBC # BLD AUTO: 0 K/UL
NRBC BLD-RTO: 0 /100 WBC (ref 0–0.2)
PHOSPHATE SERPL-MCNC: 3.5 MG/DL (ref 2.5–4.5)
PLATELET # BLD AUTO: 170 K/UL (ref 164–446)
PMV BLD AUTO: 9.2 FL (ref 9–12.9)
POTASSIUM SERPL-SCNC: 3.9 MMOL/L (ref 3.6–5.5)
PROT SERPL-MCNC: 5 G/DL (ref 6–8.2)
PROTHROMBIN TIME: 13.8 SEC (ref 12–14.6)
RBC # BLD AUTO: 3.57 M/UL (ref 4.7–6.1)
SODIUM SERPL-SCNC: 142 MMOL/L (ref 135–145)
UFH PPP CHRO-ACNC: <0.1 IU/ML
WBC # BLD AUTO: 5.7 K/UL (ref 4.8–10.8)

## 2024-06-06 PROCEDURE — 770001 HCHG ROOM/CARE - MED/SURG/GYN PRIV*

## 2024-06-06 PROCEDURE — A9270 NON-COVERED ITEM OR SERVICE: HCPCS | Performed by: NURSE PRACTITIONER

## 2024-06-06 PROCEDURE — 97166 OT EVAL MOD COMPLEX 45 MIN: CPT

## 2024-06-06 PROCEDURE — 84100 ASSAY OF PHOSPHORUS: CPT

## 2024-06-06 PROCEDURE — A9270 NON-COVERED ITEM OR SERVICE: HCPCS | Performed by: SURGERY

## 2024-06-06 PROCEDURE — A9270 NON-COVERED ITEM OR SERVICE: HCPCS | Performed by: REGISTERED NURSE

## 2024-06-06 PROCEDURE — 36415 COLL VENOUS BLD VENIPUNCTURE: CPT

## 2024-06-06 PROCEDURE — 700105 HCHG RX REV CODE 258: Performed by: NURSE PRACTITIONER

## 2024-06-06 PROCEDURE — 97162 PT EVAL MOD COMPLEX 30 MIN: CPT

## 2024-06-06 PROCEDURE — 700111 HCHG RX REV CODE 636 W/ 250 OVERRIDE (IP): Performed by: NURSE PRACTITIONER

## 2024-06-06 PROCEDURE — 71045 X-RAY EXAM CHEST 1 VIEW: CPT

## 2024-06-06 PROCEDURE — 85025 COMPLETE CBC W/AUTO DIFF WBC: CPT

## 2024-06-06 PROCEDURE — C1751 CATH, INF, PER/CENT/MIDLINE: HCPCS

## 2024-06-06 PROCEDURE — 700111 HCHG RX REV CODE 636 W/ 250 OVERRIDE (IP): Performed by: REGISTERED NURSE

## 2024-06-06 PROCEDURE — 700111 HCHG RX REV CODE 636 W/ 250 OVERRIDE (IP)

## 2024-06-06 PROCEDURE — 93970 EXTREMITY STUDY: CPT

## 2024-06-06 PROCEDURE — 85520 HEPARIN ASSAY: CPT

## 2024-06-06 PROCEDURE — 36620 INSERTION CATHETER ARTERY: CPT

## 2024-06-06 PROCEDURE — 700102 HCHG RX REV CODE 250 W/ 637 OVERRIDE(OP): Performed by: SURGERY

## 2024-06-06 PROCEDURE — 99232 SBSQ HOSP IP/OBS MODERATE 35: CPT | Performed by: REGISTERED NURSE

## 2024-06-06 PROCEDURE — 36556 INSERT NON-TUNNEL CV CATH: CPT

## 2024-06-06 PROCEDURE — 85610 PROTHROMBIN TIME: CPT

## 2024-06-06 PROCEDURE — 97535 SELF CARE MNGMENT TRAINING: CPT

## 2024-06-06 PROCEDURE — 700102 HCHG RX REV CODE 250 W/ 637 OVERRIDE(OP): Performed by: NURSE PRACTITIONER

## 2024-06-06 PROCEDURE — 93970 EXTREMITY STUDY: CPT | Mod: 26 | Performed by: INTERNAL MEDICINE

## 2024-06-06 PROCEDURE — 700102 HCHG RX REV CODE 250 W/ 637 OVERRIDE(OP): Performed by: REGISTERED NURSE

## 2024-06-06 PROCEDURE — 83735 ASSAY OF MAGNESIUM: CPT

## 2024-06-06 PROCEDURE — 80053 COMPREHEN METABOLIC PANEL: CPT

## 2024-06-06 PROCEDURE — 85730 THROMBOPLASTIN TIME PARTIAL: CPT

## 2024-06-06 RX ORDER — HEPARIN SODIUM 1000 [USP'U]/ML
80 INJECTION, SOLUTION INTRAVENOUS; SUBCUTANEOUS ONCE
Status: DISCONTINUED | OUTPATIENT
Start: 2024-06-06 | End: 2024-06-06

## 2024-06-06 RX ORDER — OXYCODONE HYDROCHLORIDE 5 MG/1
5 TABLET ORAL
Status: DISCONTINUED | OUTPATIENT
Start: 2024-06-06 | End: 2024-06-11

## 2024-06-06 RX ORDER — HEPARIN SODIUM 1000 [USP'U]/ML
40 INJECTION, SOLUTION INTRAVENOUS; SUBCUTANEOUS PRN
Status: DISCONTINUED | OUTPATIENT
Start: 2024-06-06 | End: 2024-06-15 | Stop reason: HOSPADM

## 2024-06-06 RX ORDER — HEPARIN SODIUM 5000 [USP'U]/100ML
0-30 INJECTION, SOLUTION INTRAVENOUS CONTINUOUS
Status: DISCONTINUED | OUTPATIENT
Start: 2024-06-06 | End: 2024-06-15 | Stop reason: HOSPADM

## 2024-06-06 RX ORDER — OXYCODONE HYDROCHLORIDE 10 MG/1
10 TABLET ORAL
Status: DISCONTINUED | OUTPATIENT
Start: 2024-06-06 | End: 2024-06-11

## 2024-06-06 RX ORDER — TRAZODONE HYDROCHLORIDE 50 MG/1
50 TABLET ORAL
Status: DISCONTINUED | OUTPATIENT
Start: 2024-06-06 | End: 2024-06-15 | Stop reason: HOSPADM

## 2024-06-06 RX ORDER — GABAPENTIN 300 MG/1
300 CAPSULE ORAL EVERY 8 HOURS
Status: DISCONTINUED | OUTPATIENT
Start: 2024-06-06 | End: 2024-06-15 | Stop reason: HOSPADM

## 2024-06-06 RX ORDER — HYDROMORPHONE HYDROCHLORIDE 1 MG/ML
0.5 INJECTION, SOLUTION INTRAMUSCULAR; INTRAVENOUS; SUBCUTANEOUS
Status: DISCONTINUED | OUTPATIENT
Start: 2024-06-06 | End: 2024-06-11

## 2024-06-06 RX ADMIN — BACITRACIN ZINC: 500 OINTMENT TOPICAL at 05:21

## 2024-06-06 RX ADMIN — OXYCODONE HYDROCHLORIDE 10 MG: 10 TABLET ORAL at 16:35

## 2024-06-06 RX ADMIN — OXYCODONE HYDROCHLORIDE 10 MG: 10 TABLET ORAL at 13:48

## 2024-06-06 RX ADMIN — OXYCODONE HYDROCHLORIDE 10 MG: 10 TABLET ORAL at 05:23

## 2024-06-06 RX ADMIN — OXYCODONE HYDROCHLORIDE 10 MG: 10 TABLET ORAL at 22:06

## 2024-06-06 RX ADMIN — DOCUSATE SODIUM 100 MG: 100 CAPSULE, LIQUID FILLED ORAL at 16:35

## 2024-06-06 RX ADMIN — BACITRACIN ZINC: 500 OINTMENT TOPICAL at 00:40

## 2024-06-06 RX ADMIN — HYDROMORPHONE HYDROCHLORIDE 0.5 MG: 1 INJECTION, SOLUTION INTRAMUSCULAR; INTRAVENOUS; SUBCUTANEOUS at 17:51

## 2024-06-06 RX ADMIN — GABAPENTIN 300 MG: 300 CAPSULE ORAL at 22:05

## 2024-06-06 RX ADMIN — ACETAMINOPHEN 1000 MG: 500 TABLET, FILM COATED ORAL at 16:35

## 2024-06-06 RX ADMIN — HYDROMORPHONE HYDROCHLORIDE 0.5 MG: 1 INJECTION, SOLUTION INTRAMUSCULAR; INTRAVENOUS; SUBCUTANEOUS at 09:15

## 2024-06-06 RX ADMIN — ACETAMINOPHEN 1000 MG: 500 TABLET, FILM COATED ORAL at 23:21

## 2024-06-06 RX ADMIN — GABAPENTIN 300 MG: 300 CAPSULE ORAL at 15:40

## 2024-06-06 RX ADMIN — METAXALONE 800 MG: 800 TABLET ORAL at 16:35

## 2024-06-06 RX ADMIN — DOCUSATE SODIUM 100 MG: 100 CAPSULE, LIQUID FILLED ORAL at 05:22

## 2024-06-06 RX ADMIN — HYDROMORPHONE HYDROCHLORIDE 0.5 MG: 1 INJECTION, SOLUTION INTRAMUSCULAR; INTRAVENOUS; SUBCUTANEOUS at 00:43

## 2024-06-06 RX ADMIN — SODIUM CHLORIDE: 9 INJECTION, SOLUTION INTRAVENOUS at 05:29

## 2024-06-06 RX ADMIN — SENNOSIDES AND DOCUSATE SODIUM 1 TABLET: 50; 8.6 TABLET ORAL at 22:05

## 2024-06-06 RX ADMIN — ACETAMINOPHEN 1000 MG: 500 TABLET, FILM COATED ORAL at 11:38

## 2024-06-06 RX ADMIN — ACETAMINOPHEN 1000 MG: 500 TABLET, FILM COATED ORAL at 05:21

## 2024-06-06 RX ADMIN — METAXALONE 800 MG: 800 TABLET ORAL at 05:22

## 2024-06-06 RX ADMIN — TRAZODONE HYDROCHLORIDE 50 MG: 50 TABLET ORAL at 22:05

## 2024-06-06 RX ADMIN — HEPARIN SODIUM 18 UNITS/KG/HR: 5000 INJECTION, SOLUTION INTRAVENOUS at 23:39

## 2024-06-06 RX ADMIN — GABAPENTIN 100 MG: 100 CAPSULE ORAL at 05:22

## 2024-06-06 RX ADMIN — BACITRACIN ZINC: 500 OINTMENT TOPICAL at 16:34

## 2024-06-06 RX ADMIN — METAXALONE 800 MG: 800 TABLET ORAL at 11:38

## 2024-06-06 SDOH — ECONOMIC STABILITY: TRANSPORTATION INSECURITY
IN THE PAST 12 MONTHS, HAS LACK OF RELIABLE TRANSPORTATION KEPT YOU FROM MEDICAL APPOINTMENTS, MEETINGS, WORK OR FROM GETTING THINGS NEEDED FOR DAILY LIVING?: NO

## 2024-06-06 SDOH — ECONOMIC STABILITY: TRANSPORTATION INSECURITY
IN THE PAST 12 MONTHS, HAS THE LACK OF TRANSPORTATION KEPT YOU FROM MEDICAL APPOINTMENTS OR FROM GETTING MEDICATIONS?: NO

## 2024-06-06 ASSESSMENT — ENCOUNTER SYMPTOMS
RESPIRATORY NEGATIVE: 1
BLURRED VISION: 0
NECK PAIN: 1
CONSTITUTIONAL NEGATIVE: 1
DIZZINESS: 0
CARDIOVASCULAR NEGATIVE: 1
PSYCHIATRIC NEGATIVE: 1
GASTROINTESTINAL NEGATIVE: 1
MYALGIAS: 1
DOUBLE VISION: 0
FOCAL WEAKNESS: 0
NEUROLOGICAL NEGATIVE: 1
EYES NEGATIVE: 1

## 2024-06-06 ASSESSMENT — PATIENT HEALTH QUESTIONNAIRE - PHQ9
SUM OF ALL RESPONSES TO PHQ9 QUESTIONS 1 AND 2: 0
1. LITTLE INTEREST OR PLEASURE IN DOING THINGS: NOT AT ALL
2. FEELING DOWN, DEPRESSED, IRRITABLE, OR HOPELESS: NOT AT ALL

## 2024-06-06 ASSESSMENT — COGNITIVE AND FUNCTIONAL STATUS - GENERAL
DAILY ACTIVITIY SCORE: 24
TURNING FROM BACK TO SIDE WHILE IN FLAT BAD: A LITTLE
SUGGESTED CMS G CODE MODIFIER MOBILITY: CK
TOILETING: A LITTLE
STANDING UP FROM CHAIR USING ARMS: A LITTLE
MOVING TO AND FROM BED TO CHAIR: A LITTLE
MOBILITY SCORE: 18
DAILY ACTIVITIY SCORE: 18
DRESSING REGULAR LOWER BODY CLOTHING: A LITTLE
MOVING TO AND FROM BED TO CHAIR: A LITTLE
CLIMB 3 TO 5 STEPS WITH RAILING: A LITTLE
WALKING IN HOSPITAL ROOM: A LITTLE
MOVING FROM LYING ON BACK TO SITTING ON SIDE OF FLAT BED: A LITTLE
WALKING IN HOSPITAL ROOM: A LITTLE
TURNING FROM BACK TO SIDE WHILE IN FLAT BAD: A LITTLE
SUGGESTED CMS G CODE MODIFIER DAILY ACTIVITY: CH
PERSONAL GROOMING: A LITTLE
MOBILITY SCORE: 18
EATING MEALS: A LITTLE
STANDING UP FROM CHAIR USING ARMS: A LITTLE
MOVING FROM LYING ON BACK TO SITTING ON SIDE OF FLAT BED: A LITTLE
HELP NEEDED FOR BATHING: A LITTLE
SUGGESTED CMS G CODE MODIFIER DAILY ACTIVITY: CK
DRESSING REGULAR UPPER BODY CLOTHING: A LITTLE
SUGGESTED CMS G CODE MODIFIER MOBILITY: CK
CLIMB 3 TO 5 STEPS WITH RAILING: A LITTLE

## 2024-06-06 ASSESSMENT — LIFESTYLE VARIABLES
ALCOHOL_USE: YES
DOES PATIENT WANT TO STOP DRINKING: NO
TOTAL SCORE: 0
EVER FELT BAD OR GUILTY ABOUT YOUR DRINKING: NO
TOTAL SCORE: 0
HAVE YOU EVER FELT YOU SHOULD CUT DOWN ON YOUR DRINKING: NO
EVER HAD A DRINK FIRST THING IN THE MORNING TO STEADY YOUR NERVES TO GET RID OF A HANGOVER: NO
ON A TYPICAL DAY WHEN YOU DRINK ALCOHOL HOW MANY DRINKS DO YOU HAVE: 4
CONSUMPTION TOTAL: POSITIVE
TOTAL SCORE: 0
HOW MANY TIMES IN THE PAST YEAR HAVE YOU HAD 5 OR MORE DRINKS IN A DAY: 24
HAVE PEOPLE ANNOYED YOU BY CRITICIZING YOUR DRINKING: NO
AVERAGE NUMBER OF DAYS PER WEEK YOU HAVE A DRINK CONTAINING ALCOHOL: 0.5

## 2024-06-06 ASSESSMENT — SOCIAL DETERMINANTS OF HEALTH (SDOH)
WITHIN THE PAST 12 MONTHS, YOU WORRIED THAT YOUR FOOD WOULD RUN OUT BEFORE YOU GOT THE MONEY TO BUY MORE: NEVER TRUE
WITHIN THE LAST YEAR, HAVE YOU BEEN KICKED, HIT, SLAPPED, OR OTHERWISE PHYSICALLY HURT BY YOUR PARTNER OR EX-PARTNER?: NO
WITHIN THE PAST 12 MONTHS, THE FOOD YOU BOUGHT JUST DIDN'T LAST AND YOU DIDN'T HAVE MONEY TO GET MORE: NEVER TRUE
WITHIN THE LAST YEAR, HAVE TO BEEN RAPED OR FORCED TO HAVE ANY KIND OF SEXUAL ACTIVITY BY YOUR PARTNER OR EX-PARTNER?: NO
WITHIN THE LAST YEAR, HAVE YOU BEEN AFRAID OF YOUR PARTNER OR EX-PARTNER?: NO
IN THE PAST 12 MONTHS, HAS THE ELECTRIC, GAS, OIL, OR WATER COMPANY THREATENED TO SHUT OFF SERVICE IN YOUR HOME?: NO
WITHIN THE LAST YEAR, HAVE YOU BEEN HUMILIATED OR EMOTIONALLY ABUSED IN OTHER WAYS BY YOUR PARTNER OR EX-PARTNER?: NO

## 2024-06-06 ASSESSMENT — PAIN DESCRIPTION - PAIN TYPE
TYPE: ACUTE PAIN

## 2024-06-06 ASSESSMENT — GAIT ASSESSMENTS
GAIT LEVEL OF ASSIST: SUPERVISED
DISTANCE (FEET): 200

## 2024-06-06 ASSESSMENT — ACTIVITIES OF DAILY LIVING (ADL): TOILETING: INDEPENDENT

## 2024-06-06 NOTE — THERAPY
Physical Therapy   Initial Evaluation     Patient Name: Salvador Romo  Age:  39 y.o., Sex:  male  Medical Record #: [id number]  Today's Date: 6/6/2024     Precautions  Precautions: Fall Risk;Spinal / Back Precautions ;Cervical Collar      Assessment  Patient is 39 y.o. male admitted after fall/jump off 10 ft embankment, sustained L C7 superior facet fracture with jumped facet at C6/C7, head laceration, B foot abrasions, and closed head injury.  C-spine surgical stabilization postponed due to hemodynamic lability in the setting of polysubstance abuse, now scheduled for 6/12; pt cleared for OOB mobility by neurosurgery with C collar at all times.  Pt presented for PT evaluation with BLE strength, sensation, and coordination WFL, mobilized with SPV as detailed below.  Pt refused non-slip socks due to pain in B feet, educated on risks & policy.  Educated pt on C collar use, C spine precautions, and strategies for functional mobility during acute stay.  Will follow up post-op to ensure no decline in functional mobility prior to DC.    Plan    Physical Therapy Initial Treatment Plan   Treatment Plan : Bed Mobility, Equipment, Gait Training, Neuro Re-Education / Balance, Self Care / Home Evaluation, Stair Training, Therapeutic Activities, Therapeutic Exercise  Treatment Frequency: 1 Time per Week  Duration: Until Therapy Goals Met    DC Equipment Recommendations: None  Discharge Recommendations: Anticipate that the patient will have no further physical therapy needs after discharge from the hospital     Objective     06/06/24 1456   Precautions   Precautions Fall Risk;Spinal / Back Precautions ;Cervical Collar     Pain 0 - 10 Group   Therapist Pain Assessment Post Activity Pain Same as Prior to Activity;Nurse Notified (Not quantified, c/o pain B dorsum of feet)   Prior Living Situation   Prior Services None   Housing / Facility 1 Story House   Steps Into Home 0   Equipment Owned None   Lives with - Patient's Self Care  "Capacity Significant Other   Comments Pt plans to stay with his \"lady friend\" upon DC, info provided above.   Prior Level of Functional Mobility   Bed Mobility Independent   Transfer Status Independent   Ambulation Independent   Ambulation Distance community distances   Assistive Devices Used None   Stairs Independent   History of Falls   History of Falls Yes   Date of Last Fall (reason for admission)   Cognition    Cognition / Consciousness WDL   Level of Consciousness Alert   Comments Pleasant & receptive   Active ROM Lower Body    Active ROM Lower Body  WDL   Strength Lower Body   Lower Body Strength  WDL   Sensation Lower Body   Lower Extremity Sensation   WDL   Comments Denied N/T   Balance Assessment   Sitting Balance (Static) Good   Sitting Balance (Dynamic) Good   Standing Balance (Static) Fair +   Standing Balance (Dynamic) Fair +   Weight Shift Sitting Good   Weight Shift Standing Fair   Bed Mobility    Supine to Sit Supervised   Sit to Supine Supervised   Scooting Supervised   Comments log roll, no bed features   Gait Analysis   Gait Level Of Assist Supervised   Assistive Device None   Distance (Feet) 200   # of Times Distance was Traveled 1   Deviation (Decreased L toe off due to pain)   Weight Bearing Status No restrictions   Functional Mobility   Sit to Stand Supervised   Bed, Chair, Wheelchair Transfer Supervised   Toilet Transfers Supervised   Transfer Method Stand Step   Mobility bed mobility, ambulation   ICU Target Mobility Level   ICU Mobility - Targeted Level Level 4   Activity Tolerance   Comments functional   Edema / Skin Assessment   Edema / Skin  X   Comments Abrasions to dorsal toes and feet; LLE edema > RLE   Short Term Goals    Short Term Goal # 1 Pt will ambulate 300 ft without AD with SPV post-op to progress functional mobility   Short Term Goal # 2 Pt will ascend/descend 1 step with SPV post-op to progress functional mobility for home     "

## 2024-06-06 NOTE — CARE PLAN
The patient is Watcher - Medium risk of patient condition declining or worsening    Shift Goals  Clinical Goals: Stable neuro, surgery  Patient Goals: Pain control  Family Goals: No family present      Problem: Pain - Standard  Goal: Alleviation of pain or a reduction in pain to the patient’s comfort goal  Outcome: Progressing     Problem: Knowledge Deficit - Standard  Goal: Patient and family/care givers will demonstrate understanding of plan of care, disease process/condition, diagnostic tests and medications  Outcome: Progressing

## 2024-06-06 NOTE — PROGRESS NOTES
Neurosurgery Progress Note    Subjective:  Pt reports neck pain, left index finger tip numbness since he had pulse ox sensor on (now off) as well as pain to feet bilat.    Exam:  AAO, collar on, cooperative, QUINTANA's, trace left tricep weakness only.    BP  Min: 89/57  Max: 98/53  Pulse  Av.8  Min: 46  Max: 85  Resp  Av.5  Min: 13  Max: 53  Temp  Av.7 °C (98.1 °F)  Min: 36.3 °C (97.4 °F)  Max: 37.1 °C (98.8 °F)  SpO2  Av.3 %  Min: 90 %  Max: 100 %    No data recorded    Recent Labs     24  01524  05   WBC 7.8 5.7   RBC 4.44* 3.57*   HEMOGLOBIN 14.5 11.8*   HEMATOCRIT 40.8* 33.9*   MCV 91.9 95.0   MCH 32.7 33.1*   MCHC 35.5 34.8   RDW 42.2 44.6   PLATELETCT 248 170   MPV 9.6 9.2     Recent Labs     24  0155 24  05   SODIUM 140 142   POTASSIUM 3.8 3.9   CHLORIDE 105 113*   CO2 20 19*   GLUCOSE 91 113*   BUN 9 13   CREATININE 0.96 0.76   CALCIUM 8.6 8.0*     Recent Labs     24   APTT 21.4*   INR 1.06           Intake/Output                         24 - 24 0659 24 - 24 Total 6312-8254 7127-2868 Total                 Intake    P.O.  600  400 1000  --  -- --    P.O.  -- -- --    I.V.  975.4  1045.6   --  -- --    Volume (mL) (NS infusion) 975.4 1045.6  -- -- --    Total Intake 1575.4 1445.6 1 -- -- --       Output    Urine  150  400 550  --  -- --    Straight Catheter -- 0 0 -- -- --    Urine Void (mL) 150 400 550 -- -- --    Total Output 150 400 550 -- -- --       Net I/O     1425.4 1045.6 2471 -- -- --              Intake/Output Summary (Last 24 hours) at 2024 0907  Last data filed at 2024 0600  Gross per 24 hour   Intake 2747.74 ml   Output 550 ml   Net 2197.74 ml             Respiratory Therapy Consult   Continuous RT    Pharmacy Consult Request  1 Each PHARMACY TO DOSE    ondansetron  4 mg Q4HRS PRN    ondansetron  4 mg Q4HRS PRN    docusate sodium  100 mg BID     senna-docusate  1 Tablet Nightly    senna-docusate  1 Tablet Q24HRS PRN    polyethylene glycol/lytes  1 Packet BID    magnesium hydroxide  30 mL DAILY AT 1800    bisacodyl  10 mg Q24HRS PRN    sodium phosphate  1 Each Once PRN    NS   Continuous    acetaminophen  1,000 mg Q6HRS    Followed by    [START ON 6/10/2024] acetaminophen  1,000 mg Q6HRS PRN    oxyCODONE immediate-release  5 mg Q3HRS PRN    Or    oxyCODONE immediate-release  10 mg Q3HRS PRN    Or    HYDROmorphone  0.5 mg Q HOUR PRN    gabapentin  100 mg Q8HRS    metaxalone  800 mg TID    bacitracin   BID       Assessment and Plan:  POD #0 Left C7 facet fracture, with jumped facet C6/7  NM as above - stable  OR next week sometime  Prophylactic anticoagulation: no         Start date/time: per Trauma     Brain Compression: No

## 2024-06-06 NOTE — CARE PLAN
"The patient is Stable - Low risk of patient condition declining or worsening    Shift Goals  Clinical Goals: Pain control, stable neuro exam, mobility  Patient Goals: Pain control, \"something to eat\"  Family Goals: No family present    Progress made toward(s) clinical / shift goals:  Met    Patient is not progressing towards the following goals:      "

## 2024-06-06 NOTE — THERAPY
Occupational Therapy   Initial Evaluation     Patient Name: Salvador Romo  Age:  39 y.o., Sex:  male  Medical Record #: [id number]  Today's Date: 6/6/2024     Precautions: Spinal / Back Precautions , Cervical Collar At All Times    Assessment    Patient is 39 y.o. male admitted after jumping off [place] embankment, sustained closed head injury, C6/7 fracture pending definitive stabilization but cleared for mobility in c-collar as surgery was postponed 2/2 hemodynamic lability in the setting of polysubstance abuse, surgery now scheduled for 6/12. Pt presents to OT eval with strength and coordination functionally intact, reports mild numbness to L pointer finger. Pt demonstrates log roll bed mobility, toileting and standing grooming at SPV level. Pt receptive to spinal precautions education and compensatory strategies for ADLs. Will check in after surgery to ensure no needs prior to DC to his girlfriend's home.     Plan    Occupational Therapy Initial Treatment Plan   Treatment Interventions: Self Care / Activities of Daily Living, Therapeutic Exercises, Therapeutic Activity  Treatment Frequency: 1 Time per Week  Duration: Until Therapy Goals Met    DC Equipment Recommendations: None  Discharge Recommendations: Anticipate that the patient will have no further occupational therapy needs after discharge from the hospital     Objective     06/06/24 1452   Prior Living Situation   Prior Services None   Housing / Facility 1 Story House   Steps Into Home 0   Bathroom Set up Walk In Shower   Equipment Owned None   Lives with - Patient's Self Care Capacity Significant Other   Comments pt plans to DC to his girlfriend's home to recover   Prior Level of ADL Function   Self Feeding Independent   Grooming / Hygiene Independent   Bathing Independent   Dressing Independent   Toileting Independent   Prior Level of IADL Function   Medication Management Independent   Laundry Independent   Kitchen Mobility Independent   Finances  Independent   Home Management Independent   Shopping Independent   Prior Level Of Mobility Independent Without Device in Community   Driving / Transportation Driving Independent   Precautions   Precautions Spinal / Back Precautions ;Cervical Collar     Pain 0 - 10 Group   Therapist Pain Assessment During Activity;Nurse Notified  (c/o B dorsum of feet and shoulder pain, not rated)   Cognition    Cognition / Consciousness WDL   Level of Consciousness Alert   Comments pleasant and cooperative   Strength Upper Body   Upper Body Strength  X   Comments functional for ADLs, limited by pain, not formally assessed 2/2 unstable c-spine   Sensation Upper Body   Upper Extremity Sensation  X   Comments L index finger numbness since injury, no other sensation changes   Upper Body Muscle Tone   Upper Body Muscle Tone  WDL   Coordination Upper Body   Coordination WDL   Balance Assessment   Sitting Balance (Static) Good   Sitting Balance (Dynamic) Good   Standing Balance (Static) Fair +   Standing Balance (Dynamic) Fair +   Weight Shift Sitting Good   Weight Shift Standing Fair   Comments no AD   Bed Mobility    Supine to Sit Supervised   Sit to Supine Supervised   Scooting Supervised   Rolling Supervised   Comments log roll from flat bed   ADL Assessment   Eating Independent   Grooming Standing;Supervision   Toileting Supervision   How much help from another person does the patient currently need...   Putting on and taking off regular lower body clothing? 4   Bathing (including washing, rinsing, and drying)? 4   Toileting, which includes using a toilet, bedpan, or urinal? 4   Putting on and taking off regular upper body clothing? 4   Taking care of personal grooming such as brushing teeth? 4   Eating meals? 4   6 Clicks Daily Activity Score 24   Functional Mobility   Sit to Stand Supervised   Bed, Chair, Wheelchair Transfer Supervised   Toilet Transfers Supervised   Transfer Method Stand Step   Mobility FWW   ICU Target Mobility  Level   ICU Mobility - Targeted Level Level 4   Activity Tolerance   Comments functional for self-care ADLs in c-collar   Patient / Family Goals   Patient / Family Goal #1 DC to friend's home post-op   Short Term Goals   Short Term Goal # 1 pt will demo dressing ADLs while maintaining spinal precautions without cues post-op c-spine surgery   Short Term Goal # 2 pt will complete all functional transfers at SP level   Education Group   Education Provided Role of Occupational Therapist;Activities of Daily Living;Brace Wear and Care;Spinal Precautions;Pathology of bedrest   Role of Occupational Therapist Patient Response Patient;Acceptance;Explanation;Verbal Demonstration   Spinal Precautions Patient Response Patient;Acceptance;Explanation;Verbal Demonstration   Brace Wear & Care Patient Response Patient;Acceptance;Explanation;Verbal Demonstration   ADL Patient Response Patient;Acceptance;Explanation;Verbal Demonstration   Pathology of Bedrest Patient Response Patient;Acceptance;Explanation;Verbal Demonstration   Occupational Therapy Initial Treatment Plan    Treatment Interventions Self Care / Activities of Daily Living;Therapeutic Exercises;Therapeutic Activity   Treatment Frequency 1 Time per Week   Duration Until Therapy Goals Met   Problem List   Problem List Limited Knowledge of Post Op Precautions   Anticipated Discharge Equipment and Recommendations   DC Equipment Recommendations None   Discharge Recommendations Anticipate that the patient will have no further occupational therapy needs after discharge from the hospital

## 2024-06-06 NOTE — PROGRESS NOTES
Trauma / Surgical Daily Progress Note    Date of Service  6/6/2024    Chief Complaint  39 y.o. male admitted 6/5/2024 with cervical neck injury after jumping off 10 ft wall.    Interval Events  Surgery cancelled d/t polysubstance abuse.   Surgical fixation planned for next week.   Pain well managed.   Medically stable for transfer to floor.   Interval initiation of of DVT prophylaxis, cleared by neurosurgery.     Review of Systems  Review of Systems   Constitutional: Negative.    Eyes: Negative.  Negative for blurred vision and double vision.   Respiratory: Negative.     Cardiovascular: Negative.    Gastrointestinal: Negative.    Genitourinary: Negative.    Musculoskeletal:  Positive for myalgias and neck pain.   Neurological: Negative.  Negative for dizziness and focal weakness.   Psychiatric/Behavioral: Negative.     All other systems reviewed and are negative.       Vital Signs  Temp:  [36.3 °C (97.4 °F)-37.1 °C (98.8 °F)] 36.5 °C (97.7 °F)  Pulse:  [46-85] 74  Resp:  [13-53] 20  BP: ()/(50-74) 90/52  SpO2:  [90 %-99 %] 99 %    Physical Exam  Physical Exam  Vitals and nursing note reviewed.   Constitutional:       General: He is awake. He is not in acute distress.     Appearance: Normal appearance. He is well-developed.      Interventions: Cervical collar and nasal cannula in place.   HENT:      Head: Normocephalic and atraumatic.      Nose: Nose normal.      Mouth/Throat:      Mouth: Mucous membranes are moist.      Pharynx: Oropharynx is clear.   Eyes:      Extraocular Movements: Extraocular movements intact.      Conjunctiva/sclera: Conjunctivae normal.      Pupils: Pupils are equal, round, and reactive to light.   Cardiovascular:      Rate and Rhythm: Normal rate and regular rhythm.      Pulses: Normal pulses.   Pulmonary:      Effort: Pulmonary effort is normal. No respiratory distress.   Chest:      Chest wall: No deformity, swelling, tenderness or crepitus.   Abdominal:      General: Abdomen is  flat. Bowel sounds are normal.      Palpations: Abdomen is soft.   Genitourinary:     Comments: Voiding.   Musculoskeletal:         General: Normal range of motion.      Cervical back: Neck supple. Spinous process tenderness present.      Comments: 5/5 upper and lower extremity strength bilaterally.    Skin:     General: Skin is warm and dry.      Capillary Refill: Capillary refill takes less than 2 seconds.      Findings: Abrasion present.      Comments: Superficial abrasions to bilateral anterior feet.    Neurological:      General: No focal deficit present.      Mental Status: He is alert and oriented to person, place, and time. Mental status is at baseline.      Comments: Left 2nd digit paraesthesia.    Psychiatric:         Mood and Affect: Mood normal.         Laboratory  Recent Results (from the past 24 hour(s))   CBC with Differential: Tomorrow AM    Collection Time: 06/06/24  5:43 AM   Result Value Ref Range    WBC 5.7 4.8 - 10.8 K/uL    RBC 3.57 (L) 4.70 - 6.10 M/uL    Hemoglobin 11.8 (L) 14.0 - 18.0 g/dL    Hematocrit 33.9 (L) 42.0 - 52.0 %    MCV 95.0 81.4 - 97.8 fL    MCH 33.1 (H) 27.0 - 33.0 pg    MCHC 34.8 32.3 - 36.5 g/dL    RDW 44.6 35.9 - 50.0 fL    Platelet Count 170 164 - 446 K/uL    MPV 9.2 9.0 - 12.9 fL    Neutrophils-Polys 47.10 44.00 - 72.00 %    Lymphocytes 42.60 (H) 22.00 - 41.00 %    Monocytes 7.20 0.00 - 13.40 %    Eosinophils 2.50 0.00 - 6.90 %    Basophils 0.40 0.00 - 1.80 %    Immature Granulocytes 0.20 0.00 - 0.90 %    Nucleated RBC 0.00 0.00 - 0.20 /100 WBC    Neutrophils (Absolute) 2.69 1.82 - 7.42 K/uL    Lymphs (Absolute) 2.43 1.00 - 4.80 K/uL    Monos (Absolute) 0.41 0.00 - 0.85 K/uL    Eos (Absolute) 0.14 0.00 - 0.51 K/uL    Baso (Absolute) 0.02 0.00 - 0.12 K/uL    Immature Granulocytes (abs) 0.01 0.00 - 0.11 K/uL    NRBC (Absolute) 0.00 K/uL   Comp Metabolic Panel (CMP): Tomorrow AM    Collection Time: 06/06/24  5:43 AM   Result Value Ref Range    Sodium 142 135 - 145 mmol/L     Potassium 3.9 3.6 - 5.5 mmol/L    Chloride 113 (H) 96 - 112 mmol/L    Co2 19 (L) 20 - 33 mmol/L    Anion Gap 10.0 7.0 - 16.0    Glucose 113 (H) 65 - 99 mg/dL    Bun 13 8 - 22 mg/dL    Creatinine 0.76 0.50 - 1.40 mg/dL    Calcium 8.0 (L) 8.5 - 10.5 mg/dL    Correct Calcium 8.6 8.5 - 10.5 mg/dL    AST(SGOT) 14 12 - 45 U/L    ALT(SGPT) 9 2 - 50 U/L    Alkaline Phosphatase 75 30 - 99 U/L    Total Bilirubin <0.2 0.1 - 1.5 mg/dL    Albumin 3.3 3.2 - 4.9 g/dL    Total Protein 5.0 (L) 6.0 - 8.2 g/dL    Globulin 1.7 (L) 1.9 - 3.5 g/dL    A-G Ratio 1.9 g/dL   Magnesium: Every Monday and Thursday AM    Collection Time: 06/06/24  5:43 AM   Result Value Ref Range    Magnesium 1.8 1.5 - 2.5 mg/dL   Phosphorus: Every Monday and Thursday AM    Collection Time: 06/06/24  5:43 AM   Result Value Ref Range    Phosphorus 3.5 2.5 - 4.5 mg/dL   ESTIMATED GFR    Collection Time: 06/06/24  5:43 AM   Result Value Ref Range    GFR (CKD-EPI) 117 >60 mL/min/1.73 m 2       Fluids    Intake/Output Summary (Last 24 hours) at 6/6/2024 1329  Last data filed at 6/6/2024 1200  Gross per 24 hour   Intake 3220.94 ml   Output 750 ml   Net 2470.94 ml       Core Measures & Quality Metrics  Radiology images reviewed, Labs reviewed and Medications reviewed  Kaiser catheter: No Kaiser      DVT Prophylaxis: Enoxaparin (Lovenox)  DVT prophylaxis - mechanical: SCDs  Ulcer prophylaxis: No    Assessed for rehab: Patient was assess for and/or received rehabilitation services during this hospitalization    RAP Score Total: 4    CAGE Results: positive Blood Alcohol>0.08: no       Assessment complete date: 6/5/2024  Intervention: Complete. Patient response to intervention: patient rreports he uses substances recreationally, declines intervention..   Patient demonstrates understanding of intervention. Patient does not agree to follow-up.   has not been contacted. Total ETOH intervention time: 15 - 30 mintues      Assessment/Plan  * Trauma- (present on  admission)  Assessment & Plan  Jumped off an embankment ~ 10 ft height.  Trauma Green Activation.  Cosmo Ruiz MD. Trauma Surgery.     Closed fracture of seventh cervical vertebra (HCC)- (present on admission)  Assessment & Plan  Left C7 superior facet fracture with jumped facet at C6/C7.  2.7 mm anterolisthesis C6 on C7  CTA within normal limits.  Definitive operative reduction and stabilization pending. 6/12  Cervical immobilization.  Mitch Leon MD, PhD. Neurosurgeon. [facility].    Fracture three ribs-closed, right, initial encounter- (present on admission)  Assessment & Plan  subtle anterior right fourth through sixth rib fractures.  Aggressive multimodal pain management, and pulmonary hygiene. Serial chest radiographs.    Contraindication to deep vein thrombosis (DVT) prophylaxis- (present on admission)  Assessment & Plan  VTE prophylaxis initially contraindicated secondary to elevated bleeding risk.  6/7 Trauma surveillance venous duplex ultrasonography ordered.    Laceration of head- (present on admission)  Assessment & Plan  Repaired in ED.      Discussed patient condition with RN, Pharmacy, Charge nurse / hot rounds, and trauma surgery .

## 2024-06-07 ENCOUNTER — APPOINTMENT (OUTPATIENT)
Dept: RADIOLOGY | Facility: MEDICAL CENTER | Age: 39
DRG: 519 | End: 2024-06-07
Attending: REGISTERED NURSE
Payer: COMMERCIAL

## 2024-06-07 LAB
ALBUMIN SERPL BCP-MCNC: 3.7 G/DL (ref 3.2–4.9)
ALBUMIN/GLOB SERPL: 1.7 G/DL
ALP SERPL-CCNC: 80 U/L (ref 30–99)
ALT SERPL-CCNC: 13 U/L (ref 2–50)
ANION GAP SERPL CALC-SCNC: 10 MMOL/L (ref 7–16)
AST SERPL-CCNC: 19 U/L (ref 12–45)
BASOPHILS # BLD AUTO: 0.2 % (ref 0–1.8)
BASOPHILS # BLD: 0.01 K/UL (ref 0–0.12)
BILIRUB SERPL-MCNC: <0.2 MG/DL (ref 0.1–1.5)
BUN SERPL-MCNC: 12 MG/DL (ref 8–22)
CALCIUM ALBUM COR SERPL-MCNC: 8.1 MG/DL (ref 8.5–10.5)
CALCIUM SERPL-MCNC: 7.9 MG/DL (ref 8.5–10.5)
CHLORIDE SERPL-SCNC: 109 MMOL/L (ref 96–112)
CO2 SERPL-SCNC: 22 MMOL/L (ref 20–33)
CREAT SERPL-MCNC: 0.74 MG/DL (ref 0.5–1.4)
EOSINOPHIL # BLD AUTO: 0.1 K/UL (ref 0–0.51)
EOSINOPHIL NFR BLD: 1.8 % (ref 0–6.9)
ERYTHROCYTE [DISTWIDTH] IN BLOOD BY AUTOMATED COUNT: 44 FL (ref 35.9–50)
GFR SERPLBLD CREATININE-BSD FMLA CKD-EPI: 118 ML/MIN/1.73 M 2
GLOBULIN SER CALC-MCNC: 2.2 G/DL (ref 1.9–3.5)
GLUCOSE SERPL-MCNC: 107 MG/DL (ref 65–99)
HCT VFR BLD AUTO: 37.1 % (ref 42–52)
HGB BLD-MCNC: 12.9 G/DL (ref 14–18)
IMM GRANULOCYTES # BLD AUTO: 0.02 K/UL (ref 0–0.11)
IMM GRANULOCYTES NFR BLD AUTO: 0.4 % (ref 0–0.9)
LYMPHOCYTES # BLD AUTO: 1.49 K/UL (ref 1–4.8)
LYMPHOCYTES NFR BLD: 26.3 % (ref 22–41)
MCH RBC QN AUTO: 32.7 PG (ref 27–33)
MCHC RBC AUTO-ENTMCNC: 34.8 G/DL (ref 32.3–36.5)
MCV RBC AUTO: 94.2 FL (ref 81.4–97.8)
MONOCYTES # BLD AUTO: 0.46 K/UL (ref 0–0.85)
MONOCYTES NFR BLD AUTO: 8.1 % (ref 0–13.4)
NEUTROPHILS # BLD AUTO: 3.59 K/UL (ref 1.82–7.42)
NEUTROPHILS NFR BLD: 63.2 % (ref 44–72)
NRBC # BLD AUTO: 0 K/UL
NRBC BLD-RTO: 0 /100 WBC (ref 0–0.2)
PHOSPHATE SERPL-MCNC: 2.2 MG/DL (ref 2.5–4.5)
PLATELET # BLD AUTO: 185 K/UL (ref 164–446)
PMV BLD AUTO: 9.7 FL (ref 9–12.9)
POTASSIUM SERPL-SCNC: 4 MMOL/L (ref 3.6–5.5)
PROT SERPL-MCNC: 5.9 G/DL (ref 6–8.2)
RBC # BLD AUTO: 3.94 M/UL (ref 4.7–6.1)
SODIUM SERPL-SCNC: 141 MMOL/L (ref 135–145)
UFH PPP CHRO-ACNC: 0.44 IU/ML
UFH PPP CHRO-ACNC: 0.6 IU/ML
WBC # BLD AUTO: 5.7 K/UL (ref 4.8–10.8)

## 2024-06-07 PROCEDURE — 71045 X-RAY EXAM CHEST 1 VIEW: CPT

## 2024-06-07 PROCEDURE — 80053 COMPREHEN METABOLIC PANEL: CPT

## 2024-06-07 PROCEDURE — 770001 HCHG ROOM/CARE - MED/SURG/GYN PRIV*

## 2024-06-07 PROCEDURE — 36415 COLL VENOUS BLD VENIPUNCTURE: CPT

## 2024-06-07 PROCEDURE — 700111 HCHG RX REV CODE 636 W/ 250 OVERRIDE (IP)

## 2024-06-07 PROCEDURE — 85025 COMPLETE CBC W/AUTO DIFF WBC: CPT

## 2024-06-07 PROCEDURE — A9270 NON-COVERED ITEM OR SERVICE: HCPCS | Performed by: REGISTERED NURSE

## 2024-06-07 PROCEDURE — A9270 NON-COVERED ITEM OR SERVICE: HCPCS | Performed by: NURSE PRACTITIONER

## 2024-06-07 PROCEDURE — 700102 HCHG RX REV CODE 250 W/ 637 OVERRIDE(OP): Performed by: REGISTERED NURSE

## 2024-06-07 PROCEDURE — 700102 HCHG RX REV CODE 250 W/ 637 OVERRIDE(OP): Performed by: NURSE PRACTITIONER

## 2024-06-07 PROCEDURE — 85520 HEPARIN ASSAY: CPT | Mod: 91

## 2024-06-07 PROCEDURE — 84100 ASSAY OF PHOSPHORUS: CPT

## 2024-06-07 PROCEDURE — 700111 HCHG RX REV CODE 636 W/ 250 OVERRIDE (IP): Performed by: REGISTERED NURSE

## 2024-06-07 PROCEDURE — 99233 SBSQ HOSP IP/OBS HIGH 50: CPT | Performed by: NURSE PRACTITIONER

## 2024-06-07 RX ADMIN — GABAPENTIN 300 MG: 300 CAPSULE ORAL at 04:43

## 2024-06-07 RX ADMIN — OXYCODONE HYDROCHLORIDE 10 MG: 10 TABLET ORAL at 03:36

## 2024-06-07 RX ADMIN — OXYCODONE HYDROCHLORIDE 10 MG: 10 TABLET ORAL at 17:59

## 2024-06-07 RX ADMIN — DOCUSATE SODIUM 100 MG: 100 CAPSULE, LIQUID FILLED ORAL at 17:58

## 2024-06-07 RX ADMIN — OXYCODONE HYDROCHLORIDE 10 MG: 10 TABLET ORAL at 21:19

## 2024-06-07 RX ADMIN — DOCUSATE SODIUM 100 MG: 100 CAPSULE, LIQUID FILLED ORAL at 04:43

## 2024-06-07 RX ADMIN — ACETAMINOPHEN 1000 MG: 500 TABLET, FILM COATED ORAL at 17:57

## 2024-06-07 RX ADMIN — HYDROMORPHONE HYDROCHLORIDE 0.5 MG: 1 INJECTION, SOLUTION INTRAMUSCULAR; INTRAVENOUS; SUBCUTANEOUS at 04:43

## 2024-06-07 RX ADMIN — GABAPENTIN 300 MG: 300 CAPSULE ORAL at 13:10

## 2024-06-07 RX ADMIN — METAXALONE 800 MG: 800 TABLET ORAL at 12:46

## 2024-06-07 RX ADMIN — HEPARIN SODIUM 18 UNITS/KG/HR: 5000 INJECTION, SOLUTION INTRAVENOUS at 15:33

## 2024-06-07 RX ADMIN — ACETAMINOPHEN 1000 MG: 500 TABLET, FILM COATED ORAL at 23:08

## 2024-06-07 RX ADMIN — OXYCODONE HYDROCHLORIDE 10 MG: 10 TABLET ORAL at 12:46

## 2024-06-07 RX ADMIN — METAXALONE 800 MG: 800 TABLET ORAL at 18:00

## 2024-06-07 RX ADMIN — GABAPENTIN 300 MG: 300 CAPSULE ORAL at 21:19

## 2024-06-07 RX ADMIN — ACETAMINOPHEN 1000 MG: 500 TABLET, FILM COATED ORAL at 04:43

## 2024-06-07 RX ADMIN — SENNOSIDES AND DOCUSATE SODIUM 1 TABLET: 50; 8.6 TABLET ORAL at 21:19

## 2024-06-07 RX ADMIN — BACITRACIN ZINC: 500 OINTMENT TOPICAL at 18:01

## 2024-06-07 RX ADMIN — METAXALONE 800 MG: 800 TABLET ORAL at 04:43

## 2024-06-07 RX ADMIN — BACITRACIN ZINC: 500 OINTMENT TOPICAL at 04:49

## 2024-06-07 RX ADMIN — HYDROMORPHONE HYDROCHLORIDE 0.5 MG: 1 INJECTION, SOLUTION INTRAMUSCULAR; INTRAVENOUS; SUBCUTANEOUS at 23:07

## 2024-06-07 RX ADMIN — OXYCODONE HYDROCHLORIDE 10 MG: 10 TABLET ORAL at 09:13

## 2024-06-07 RX ADMIN — TRAZODONE HYDROCHLORIDE 50 MG: 50 TABLET ORAL at 23:07

## 2024-06-07 RX ADMIN — ACETAMINOPHEN 1000 MG: 500 TABLET, FILM COATED ORAL at 12:46

## 2024-06-07 ASSESSMENT — COGNITIVE AND FUNCTIONAL STATUS - GENERAL
DRESSING REGULAR UPPER BODY CLOTHING: A LITTLE
SUGGESTED CMS G CODE MODIFIER DAILY ACTIVITY: CK
TURNING FROM BACK TO SIDE WHILE IN FLAT BAD: A LITTLE
SUGGESTED CMS G CODE MODIFIER MOBILITY: CK
MOBILITY SCORE: 18
HELP NEEDED FOR BATHING: A LITTLE
MOVING FROM LYING ON BACK TO SITTING ON SIDE OF FLAT BED: A LITTLE
DAILY ACTIVITIY SCORE: 18
TOILETING: A LITTLE
STANDING UP FROM CHAIR USING ARMS: A LITTLE
CLIMB 3 TO 5 STEPS WITH RAILING: A LITTLE
MOVING TO AND FROM BED TO CHAIR: A LITTLE
PERSONAL GROOMING: A LITTLE
DRESSING REGULAR LOWER BODY CLOTHING: A LITTLE
EATING MEALS: A LITTLE
WALKING IN HOSPITAL ROOM: A LITTLE

## 2024-06-07 ASSESSMENT — PAIN DESCRIPTION - PAIN TYPE
TYPE: ACUTE PAIN

## 2024-06-07 ASSESSMENT — ENCOUNTER SYMPTOMS
CARDIOVASCULAR NEGATIVE: 1
NECK PAIN: 1
RESPIRATORY NEGATIVE: 1
NEUROLOGICAL NEGATIVE: 1
EYES NEGATIVE: 1
FOCAL WEAKNESS: 0
CONSTITUTIONAL NEGATIVE: 1
DIZZINESS: 0
GASTROINTESTINAL NEGATIVE: 1
DOUBLE VISION: 0
PSYCHIATRIC NEGATIVE: 1
BLURRED VISION: 0
MYALGIAS: 1

## 2024-06-07 NOTE — PROGRESS NOTES
4 Eyes Skin Assessment Completed by RASHAUN Hassan and RASHAUN Davis.    Head Redness and Incision, staples  Ears WDL  Nose WDL  Mouth WDL  Neck WDL  Breast/Chest WDL  Shoulder Blades WDL  Spine abrasion  (R) Arm/Elbow/Hand Redness, Bruising, and Abrasion  (L) Arm/Elbow/Hand Redness and Bruising  Abdomen WDL  Groin WDL  Scrotum/Coccyx/Buttocks WDL  (R) Leg Bruising and Abrasion  (L) Leg Bruising and Abrasion  (R) Heel/Foot/Toe Redness and Bruising, abrasion    (L) Heel/Foot/Toe Redness and Bruising, abrasion          Devices In Places Pulse Ox, C-collar      Interventions In Place Pillows    Possible Skin Injury Yes    Pictures Uploaded Into Epic Yes  Wound Consult Placed N/A  RN Wound Prevention Protocol Ordered No

## 2024-06-07 NOTE — PROGRESS NOTES
NO acute changes from previous assessment.  PT is AOX4  VS are /73   Pulse (!) 54   Temp 36.2 °C (97.2 °F) (Temporal)   Resp 16   Ht 1.829 m (6')   Wt 84 kg (185 lb 3 oz)   SpO2 93%   White board has been updated  PT has been informed of plan for care today  Pain is 7/10  IVs have been checked for patency and are saline locked. Bed in lowest position call light within reach. Patient oriented to room all questions answered at this time.

## 2024-06-07 NOTE — ASSESSMENT & PLAN NOTE
Subacute and chronic left lower extremity DVT noted on LE Duplex US (6/6).  Patient has a known history of DVT and has completed a 6mo course of Xarelto.   6/6 Heparin gtt initiated and okayed by neurosurgery.  6/12 heparin stopped for surgery.  Dr. Briscoe would like drip to remain off until removal of Hemovac.  6/14 Hemovac discontinued.   Anti-Xa protocol.

## 2024-06-07 NOTE — DISCHARGE PLANNING
"RN CM met with patient at bedside to complete assessment. Patient pleasantly A&Ox4 and able to verify information on face sheet.   He plans on staying with his close friend, Rox, in single story home in [facility] once he is cleared for discharge. He does not own or use DME at baseline, including oxygen. He owns crutches but no other DME. Reports independence with ADLs and IADLs at baseline.   History of Cocaine and THC use but patient denies it being \"a problem\". History of anxiety and depression for which he takes Sertraline, Trazodone and Gabapentin. Denies SI/HI/psychosis, has never been hospitalized for psychiatric issues.   His close friend, Rox will drive him home upon discharge once medically cleared.     Case Management Discharge Planning    Admission Date: 6/5/2024  GMLOS: 3.1  ALOS: 2    6-Clicks ADL Score: 18  6-Clicks Mobility Score: 18      Anticipated Discharge Dispo: Discharge Disposition: D/T to home under [facility] care in anticipation of covered skilled care (06)  Discharge Address: [address] [place] [id number]  Discharge Contact Phone Number: [phone]    DME Needed: Yes    DME Ordered: No    Action(s) Taken: Updated Provider/Nurse on Discharge Plan, Patient Conference, and DC Assessment Complete (See below)    Escalations Completed: None    Medically Clear: No    Next Steps: Pending surgery, then PT/OT evaluation/recommendations.     Barriers to Discharge: Medical clearance, Pending PT Evaluation, and Pending Procedures    Is the patient up for discharge tomorrow: No    Care Transition Team Assessment    Information Source  Orientation Level: Oriented X4  Information Given By: Patient  Informant's Name: Salvador  Who is responsible for making decisions for patient? : Patient    Readmission Evaluation  Is this a readmission?: No    Elopement Risk  Legal Hold: No  Ambulatory or Self Mobile in Wheelchair: No-Not an Elopement Risk  Elopement Risk: Not at Risk for " "Elopement    Interdisciplinary Discharge Planning  Does Admitting Nurse Feel This Could be a Complex Discharge?: No  Lives with - Patient's Self Care Capacity: Significant Other  Patient or legal guardian wants to designate a caregiver: No  Support Systems: Family Member(s), Friends / Neighbors  Housing / Facility: 3 Story House, 1 Story House  Prior Services: None    Discharge Preparedness  What is your plan after discharge?: Uncertain - pending medical team collaboration  What are your discharge supports?: Partner  Prior Functional Level: Ambulatory, Drives Self, Independent with Activities of Daily Living, Independent with Medication Management  Difficulity with ADLs: None  Difficulity with IADLs: None    Functional Assesment  Prior Functional Level: Ambulatory, Drives Self, Independent with Activities of Daily Living, Independent with Medication Management    Finances  Financial Barriers to Discharge: No  Prescription Coverage: Yes    Vision / Hearing Impairment  Vision Impairment : No  Hearing Impairment : No    Values / Beliefs / Concerns  Values / Beliefs Concerns : No    Advance Directive  Advance Directive?: None  Advance Directive offered?: AD Booklet refused    Domestic Abuse  Have you ever been the victim of abuse or violence?: No  Possible Abuse/Neglect Reported to:: Not Applicable    Psychological Assessment  History of Substance Abuse: Cocaine, Marijuana  Date Last Used - Cocaine: 6/4/2024  Date Last Used - Marijuana: 6/4/2024  Substance Abuse Comments: Reports occasionally using substances but denies having \"A problem\"  History of Psychiatric Problems: Yes  Non-compliant with Treatment: No  Newly Diagnosed Illness: Yes    Discharge Risks or Barriers  Discharge risks or barriers?: Substance abuse, Complex medical needs, Mental health  Patient risk factors: Complex medical needs, Mental health, Substance abuse    Anticipated Discharge Information  Discharge Disposition: D/T to home under [facility] care in " anticipation of covered skilled care (06)  Discharge Address: [address] [facility] [address]  Discharge Contact Phone Number: [phone]

## 2024-06-07 NOTE — PROGRESS NOTES
Neurosurgery Progress Note    Subjective:  No acute events overnight    Exam:  AAO, collar on, cooperative,   trace left tricep weakness  Sensation intact touch 4 extremities    BP  Min: 101/59  Max: 130/67  Pulse  Av.2  Min: 54  Max: 97  Resp  Av.1  Min: 16  Max: 20  Temp  Av.6 °C (97.8 °F)  Min: 36.2 °C (97.2 °F)  Max: 36.9 °C (98.4 °F)  SpO2  Av.9 %  Min: 93 %  Max: 97 %    No data recorded    Recent Labs     24  0155 24  0543 24  0039   WBC 7.8 5.7 5.7   RBC 4.44* 3.57* 3.94*   HEMOGLOBIN 14.5 11.8* 12.9*   HEMATOCRIT 40.8* 33.9* 37.1*   MCV 91.9 95.0 94.2   MCH 32.7 33.1* 32.7   MCHC 35.5 34.8 34.8   RDW 42.2 44.6 44.0   PLATELETCT 248 170 185   MPV 9.6 9.2 9.7     Recent Labs     24  0155 24  0543 24  0039   SODIUM 140 142 141   POTASSIUM 3.8 3.9 4.0   CHLORIDE 105 113* 109   CO2 20 19* 22   GLUCOSE 91 113* 107*   BUN 9 13 12   CREATININE 0.96 0.76 0.74   CALCIUM 8.6 8.0* 7.9*     Recent Labs     24  0155 24  2227   APTT 21.4* 26.2   INR 1.06 1.05           Intake/Output                         24 07 - 24 0659 24 07 - 2459     3153-5144 9962-1876 Total 8742-4313 1476-2267 Total                 Intake    I.V.  744.1  -- 744.1  --  -- --    Volume (mL) (NS infusion) 744.1 -- 744.1 -- -- --    Total Intake 744.1 -- 744.1 -- -- --       Output    Urine  550  1700 2250  --  -- --    Number of Times Voided 3 x 3 x 6 x -- -- --    Urine Void (mL) 550 1700 2250 -- -- --    Stool  --  -- --  --  -- --    Number of Times Stooled 1 x 1 x 2 x -- -- --    Total Output 550 1700 2250 -- -- --       Net I/O     194.1 -1700 -1506 -- -- --              Intake/Output Summary (Last 24 hours) at 2024 1237  Last data filed at 2024 0329  Gross per 24 hour   Intake 144.18 ml   Output 1900 ml   Net -1755.82 ml             gabapentin  300 mg Q8HRS    oxyCODONE immediate-release  5 mg Q3HRS PRN    Or    oxyCODONE immediate-release   10 mg Q3HRS PRN    Or    HYDROmorphone  0.5 mg Q3HRS PRN    traZODone  50 mg QHS    heparin  0-30 Units/kg/hr Continuous    heparin  40 Units/kg PRN    Respiratory Therapy Consult   Continuous RT    Pharmacy Consult Request  1 Each PHARMACY TO DOSE    ondansetron  4 mg Q4HRS PRN    ondansetron  4 mg Q4HRS PRN    docusate sodium  100 mg BID    senna-docusate  1 Tablet Nightly    senna-docusate  1 Tablet Q24HRS PRN    polyethylene glycol/lytes  1 Packet BID    magnesium hydroxide  30 mL DAILY AT 1800    bisacodyl  10 mg Q24HRS PRN    sodium phosphate  1 Each Once PRN    acetaminophen  1,000 mg Q6HRS    Followed by    [START ON 6/10/2024] acetaminophen  1,000 mg Q6HRS PRN    metaxalone  800 mg TID    bacitracin   BID       Assessment and Plan:  POD #0 Left C7 facet fracture, with jumped facet C6/7  NM as above - stable  OR next Wed; was postponed by Anesthesia due to + cocaine, amphetamines  Prophylactic anticoagulation: no         Start date/time: per Trauma     Brain Compression: No  Ok to ambulate  Cervical collar on all times

## 2024-06-07 NOTE — PROGRESS NOTES
NOC Coverage:   LE Duplex US (6/6) positive for subacute to chronic left lower extremity DVT. Mr Clarissa reports a known history of left lower extremity DVT first diagnosed in 2022 and has completed a 6mo course of Xarelto. He is established with a primary care physician who follows him closely. He denies new lower extremity pain or swelling; he denies shortness of breath or chest pain.     I've discussed with Dr Ruiz and neurosurgery. Given his prior history of unprovoked DVT and current risk he will be started on therapeutic anticoagulation. Therapeutic anticoagulation okay'd per neurosurgery. Patient is agreeable to treatment.     #Left lower extremity DVT  - Heparin gtt     I discussed anticoagulation with pharmacy who agrees that inpatient therapeutic anticoagulation is indicated; however, long term outpatient anticoagulation may not be indicated if the patient is sufficiently ambulatory upon discharge.    Linsey Wegener, APRN

## 2024-06-07 NOTE — PROGRESS NOTES
Trauma / Surgical Daily Progress Note    Date of Service  6/7/2024    Chief Complaint  39 y.o. male admitted 6/5/2024 with cervical neck injury after jumping off a 10 ft wall.   History of poly-substance abuse and above knee DVT.     Interval Events    No focal neurological deficits.  Heparin drip.    - Tentatively to OR 5/12 for repair of cervical spine injury.    Review of Systems  Review of Systems   Constitutional: Negative.    Eyes: Negative.  Negative for blurred vision and double vision.   Respiratory: Negative.     Cardiovascular: Negative.    Gastrointestinal: Negative.    Genitourinary: Negative.    Musculoskeletal:  Positive for myalgias and neck pain.   Neurological: Negative.  Negative for dizziness and focal weakness.   Psychiatric/Behavioral: Negative.     All other systems reviewed and are negative.       Vital Signs  Temp:  [36.2 °C (97.2 °F)-36.9 °C (98.4 °F)] 36.8 °C (98.2 °F)  Pulse:  [54-97] 87  Resp:  [16-20] 17  BP: ()/(52-81) 101/59  SpO2:  [93 %-99 %] 93 %    Physical Exam  Physical Exam  Vitals and nursing note reviewed.   Constitutional:       General: He is awake. He is not in acute distress.     Appearance: Normal appearance. He is well-developed. He is not ill-appearing, toxic-appearing or diaphoretic.      Interventions: Cervical collar in place.   HENT:      Head: Normocephalic and atraumatic.      Nose: Nose normal.      Mouth/Throat:      Mouth: Mucous membranes are moist.      Pharynx: Oropharynx is clear.   Eyes:      Extraocular Movements: Extraocular movements intact.      Conjunctiva/sclera: Conjunctivae normal.      Pupils: Pupils are equal, round, and reactive to light.   Cardiovascular:      Rate and Rhythm: Normal rate and regular rhythm.      Pulses: Normal pulses.   Pulmonary:      Effort: Pulmonary effort is normal. No respiratory distress.   Chest:      Chest wall: No deformity, swelling, tenderness or crepitus.   Abdominal:      General: Abdomen is flat. Bowel  sounds are normal.      Palpations: Abdomen is soft.   Genitourinary:     Comments: Voiding.   Musculoskeletal:         General: Normal range of motion.      Cervical back: Neck supple. Spinous process tenderness present.      Comments: 5/5 upper and lower extremity strength bilaterally.    Skin:     General: Skin is warm and dry.      Capillary Refill: Capillary refill takes less than 2 seconds.      Findings: Abrasion present.      Comments: Superficial abrasions to bilateral anterior feet.    Neurological:      General: No focal deficit present.      Mental Status: He is alert and oriented to person, place, and time. Mental status is at baseline.      Comments: Left 2nd digit paraesthesia.    Psychiatric:         Mood and Affect: Mood normal.         Behavior: Behavior is cooperative.         Laboratory  Recent Results (from the past 24 hour(s))   aPTT    Collection Time: 06/06/24 10:27 PM   Result Value Ref Range    APTT 26.2 24.7 - 36.0 sec   Prothrombin Time    Collection Time: 06/06/24 10:27 PM   Result Value Ref Range    PT 13.8 12.0 - 14.6 sec    INR 1.05 0.87 - 1.13   Heparin Xa (Unfractionated)    Collection Time: 06/06/24 10:27 PM   Result Value Ref Range    Heparin Xa (UFH) <0.10 IU/mL   CBC with Differential: Tomorrow AM    Collection Time: 06/07/24 12:39 AM   Result Value Ref Range    WBC 5.7 4.8 - 10.8 K/uL    RBC 3.94 (L) 4.70 - 6.10 M/uL    Hemoglobin 12.9 (L) 14.0 - 18.0 g/dL    Hematocrit 37.1 (L) 42.0 - 52.0 %    MCV 94.2 81.4 - 97.8 fL    MCH 32.7 27.0 - 33.0 pg    MCHC 34.8 32.3 - 36.5 g/dL    RDW 44.0 35.9 - 50.0 fL    Platelet Count 185 164 - 446 K/uL    MPV 9.7 9.0 - 12.9 fL    Neutrophils-Polys 63.20 44.00 - 72.00 %    Lymphocytes 26.30 22.00 - 41.00 %    Monocytes 8.10 0.00 - 13.40 %    Eosinophils 1.80 0.00 - 6.90 %    Basophils 0.20 0.00 - 1.80 %    Immature Granulocytes 0.40 0.00 - 0.90 %    Nucleated RBC 0.00 0.00 - 0.20 /100 WBC    Neutrophils (Absolute) 3.59 1.82 - 7.42 K/uL     Lymphs (Absolute) 1.49 1.00 - 4.80 K/uL    Monos (Absolute) 0.46 0.00 - 0.85 K/uL    Eos (Absolute) 0.10 0.00 - 0.51 K/uL    Baso (Absolute) 0.01 0.00 - 0.12 K/uL    Immature Granulocytes (abs) 0.02 0.00 - 0.11 K/uL    NRBC (Absolute) 0.00 K/uL   Comp Metabolic Panel (CMP): Tomorrow AM    Collection Time: 06/07/24 12:39 AM   Result Value Ref Range    Sodium 141 135 - 145 mmol/L    Potassium 4.0 3.6 - 5.5 mmol/L    Chloride 109 96 - 112 mmol/L    Co2 22 20 - 33 mmol/L    Anion Gap 10.0 7.0 - 16.0    Glucose 107 (H) 65 - 99 mg/dL    Bun 12 8 - 22 mg/dL    Creatinine 0.74 0.50 - 1.40 mg/dL    Calcium 7.9 (L) 8.5 - 10.5 mg/dL    Correct Calcium 8.1 (L) 8.5 - 10.5 mg/dL    AST(SGOT) 19 12 - 45 U/L    ALT(SGPT) 13 2 - 50 U/L    Alkaline Phosphatase 80 30 - 99 U/L    Total Bilirubin <0.2 0.1 - 1.5 mg/dL    Albumin 3.7 3.2 - 4.9 g/dL    Total Protein 5.9 (L) 6.0 - 8.2 g/dL    Globulin 2.2 1.9 - 3.5 g/dL    A-G Ratio 1.7 g/dL   Phosphorus: Every Monday and Thursday AM    Collection Time: 06/07/24 12:39 AM   Result Value Ref Range    Phosphorus 2.2 (L) 2.5 - 4.5 mg/dL   ESTIMATED GFR    Collection Time: 06/07/24 12:39 AM   Result Value Ref Range    GFR (CKD-EPI) 118 >60 mL/min/1.73 m 2   Heparin Xa (Unfractionated)    Collection Time: 06/07/24  5:06 AM   Result Value Ref Range    Heparin Xa (UFH) 0.44 IU/mL   Heparin Xa (Unfractionated)    Collection Time: 06/07/24 10:48 AM   Result Value Ref Range    Heparin Xa (UFH) 0.60 IU/mL       Fluids    Intake/Output Summary (Last 24 hours) at 6/7/2024 1159  Last data filed at 6/7/2024 0329  Gross per 24 hour   Intake 344.12 ml   Output 2100 ml   Net -1755.88 ml       Core Measures & Quality Metrics  Radiology images reviewed, Labs reviewed and Medications reviewed  Kaiser catheter: No Kaiser      DVT Prophylaxis: Heparin  DVT prophylaxis - mechanical: SCDs  Ulcer prophylaxis: No    Assessed for rehab: Patient was assess for and/or received rehabilitation services during this  hospitalization    RAP Score Total: 4    CAGE Results: positive Blood Alcohol>0.08: no CAGE Score: 0  Total: POSITIVE  Assessment complete date: 6/5/2024  Intervention: Complete. Patient response to intervention: patient rreports he uses substances recreationally, declines intervention..   Patient demonstrates understanding of intervention. Patient does not agree to follow-up.   has not been contacted. Total ETOH intervention time: 15 - 30 mintues      Assessment/Plan  * Trauma- (present on admission)  Assessment & Plan  Jumped off an embankment ~ 10 ft height.  Trauma Green Activation.  Cosmo Ruiz MD. Trauma Surgery.     Acute deep vein thrombosis (DVT) of left lower extremity (HCC)- (present on admission)  Assessment & Plan  Subacute and chronic left lower extremity DVT noted on LE Duplex US (6/6).  Patient has a known history of DVT and has completed a 6mo course of Xarelto.   6/6 Heparin gtt initiated and okayed by neurosurgery.  Anti-Xa protocol.      Closed fracture of seventh cervical vertebra (HCC)- (present on admission)  Assessment & Plan  Left C7 superior facet fracture with jumped facet at C6/C7.  2.7 mm anterolisthesis C6 on C7  CTA within normal limits.  Definitive operative reduction and stabilization pending. 6/12  Cervical immobilization. Ok to mobilize with collar securely on.    Mitch Leon MD, PhD. Neurosurgeon. [facility].    Fracture three ribs-closed, right, initial encounter- (present on admission)  Assessment & Plan  subtle anterior right fourth through sixth rib fractures.  Aggressive multimodal pain management, and pulmonary hygiene. Serial chest radiographs.    No contraindication to deep vein thrombosis (DVT) prophylaxis- (present on admission)  Assessment & Plan  VTE prophylaxis initially contraindicated secondary to elevated bleeding risk.  6/6 Left lower extremity DVT identified. Heparin gtt initiated.    Laceration of head- (present on  admission)  Assessment & Plan  Repaired in ED.        Discussed patient condition with Patient and trauma surgery, Dr. Cosmo Ruiz.

## 2024-06-07 NOTE — CARE PLAN
Problem: Pain - Standard  Goal: Alleviation of pain or a reduction in pain to the patient’s comfort goal  Outcome: Progressing     Problem: Knowledge Deficit - Standard  Goal: Patient and family/care givers will demonstrate understanding of plan of care, disease process/condition, diagnostic tests and medications  Outcome: Progressing       The patient is Stable - Low risk of patient condition declining or worsening    Shift Goals  Clinical Goals: Pain control, heparin drip  Patient Goals: Pain control  Family Goals: updates    Progress made toward(s) clinical / shift goals:  Taught pt 0-10 pain scale and non-pharmacological method of pain management, encouraged to verbalize when in pain. Administered PRN pain meds as needed. Monitoring heparin drip and anti-xa labs per orders.     Patient is not progressing towards the following goals:

## 2024-06-08 ENCOUNTER — APPOINTMENT (OUTPATIENT)
Dept: RADIOLOGY | Facility: MEDICAL CENTER | Age: 39
DRG: 519 | End: 2024-06-08
Attending: REGISTERED NURSE
Payer: COMMERCIAL

## 2024-06-08 LAB
ALBUMIN SERPL BCP-MCNC: 3.7 G/DL (ref 3.2–4.9)
ALBUMIN/GLOB SERPL: 1.5 G/DL
ALP SERPL-CCNC: 73 U/L (ref 30–99)
ALT SERPL-CCNC: 13 U/L (ref 2–50)
ANION GAP SERPL CALC-SCNC: 13 MMOL/L (ref 7–16)
AST SERPL-CCNC: 16 U/L (ref 12–45)
BASOPHILS # BLD AUTO: 0.3 % (ref 0–1.8)
BASOPHILS # BLD: 0.02 K/UL (ref 0–0.12)
BILIRUB SERPL-MCNC: <0.2 MG/DL (ref 0.1–1.5)
BUN SERPL-MCNC: 13 MG/DL (ref 8–22)
CALCIUM ALBUM COR SERPL-MCNC: 8.6 MG/DL (ref 8.5–10.5)
CALCIUM SERPL-MCNC: 8.4 MG/DL (ref 8.5–10.5)
CHLORIDE SERPL-SCNC: 107 MMOL/L (ref 96–112)
CO2 SERPL-SCNC: 20 MMOL/L (ref 20–33)
CREAT SERPL-MCNC: 0.61 MG/DL (ref 0.5–1.4)
EOSINOPHIL # BLD AUTO: 0.17 K/UL (ref 0–0.51)
EOSINOPHIL NFR BLD: 3 % (ref 0–6.9)
ERYTHROCYTE [DISTWIDTH] IN BLOOD BY AUTOMATED COUNT: 46.2 FL (ref 35.9–50)
GFR SERPLBLD CREATININE-BSD FMLA CKD-EPI: 125 ML/MIN/1.73 M 2
GLOBULIN SER CALC-MCNC: 2.4 G/DL (ref 1.9–3.5)
GLUCOSE SERPL-MCNC: 113 MG/DL (ref 65–99)
HCT VFR BLD AUTO: 38 % (ref 42–52)
HGB BLD-MCNC: 12.9 G/DL (ref 14–18)
IMM GRANULOCYTES # BLD AUTO: 0.05 K/UL (ref 0–0.11)
IMM GRANULOCYTES NFR BLD AUTO: 0.9 % (ref 0–0.9)
LYMPHOCYTES # BLD AUTO: 2.47 K/UL (ref 1–4.8)
LYMPHOCYTES NFR BLD: 43 % (ref 22–41)
MCH RBC QN AUTO: 32.7 PG (ref 27–33)
MCHC RBC AUTO-ENTMCNC: 33.9 G/DL (ref 32.3–36.5)
MCV RBC AUTO: 96.2 FL (ref 81.4–97.8)
MONOCYTES # BLD AUTO: 0.6 K/UL (ref 0–0.85)
MONOCYTES NFR BLD AUTO: 10.4 % (ref 0–13.4)
NEUTROPHILS # BLD AUTO: 2.44 K/UL (ref 1.82–7.42)
NEUTROPHILS NFR BLD: 42.4 % (ref 44–72)
NRBC # BLD AUTO: 0 K/UL
NRBC BLD-RTO: 0 /100 WBC (ref 0–0.2)
PHOSPHATE SERPL-MCNC: 2.7 MG/DL (ref 2.5–4.5)
PLATELET # BLD AUTO: 182 K/UL (ref 164–446)
PMV BLD AUTO: 10.1 FL (ref 9–12.9)
POTASSIUM SERPL-SCNC: 4 MMOL/L (ref 3.6–5.5)
PROT SERPL-MCNC: 6.1 G/DL (ref 6–8.2)
RBC # BLD AUTO: 3.95 M/UL (ref 4.7–6.1)
SODIUM SERPL-SCNC: 140 MMOL/L (ref 135–145)
UFH PPP CHRO-ACNC: 0.46 IU/ML
UFH PPP CHRO-ACNC: 0.49 IU/ML
UFH PPP CHRO-ACNC: 0.74 IU/ML
WBC # BLD AUTO: 5.8 K/UL (ref 4.8–10.8)

## 2024-06-08 PROCEDURE — A9270 NON-COVERED ITEM OR SERVICE: HCPCS | Performed by: REGISTERED NURSE

## 2024-06-08 PROCEDURE — 700102 HCHG RX REV CODE 250 W/ 637 OVERRIDE(OP): Performed by: NURSE PRACTITIONER

## 2024-06-08 PROCEDURE — 97602 WOUND(S) CARE NON-SELECTIVE: CPT

## 2024-06-08 PROCEDURE — 36415 COLL VENOUS BLD VENIPUNCTURE: CPT

## 2024-06-08 PROCEDURE — 700111 HCHG RX REV CODE 636 W/ 250 OVERRIDE (IP)

## 2024-06-08 PROCEDURE — 700111 HCHG RX REV CODE 636 W/ 250 OVERRIDE (IP): Performed by: REGISTERED NURSE

## 2024-06-08 PROCEDURE — 85520 HEPARIN ASSAY: CPT | Mod: 91

## 2024-06-08 PROCEDURE — 85025 COMPLETE CBC W/AUTO DIFF WBC: CPT

## 2024-06-08 PROCEDURE — A9270 NON-COVERED ITEM OR SERVICE: HCPCS | Performed by: NURSE PRACTITIONER

## 2024-06-08 PROCEDURE — 80053 COMPREHEN METABOLIC PANEL: CPT

## 2024-06-08 PROCEDURE — 71045 X-RAY EXAM CHEST 1 VIEW: CPT

## 2024-06-08 PROCEDURE — 84100 ASSAY OF PHOSPHORUS: CPT

## 2024-06-08 PROCEDURE — 770001 HCHG ROOM/CARE - MED/SURG/GYN PRIV*

## 2024-06-08 PROCEDURE — 700102 HCHG RX REV CODE 250 W/ 637 OVERRIDE(OP): Performed by: REGISTERED NURSE

## 2024-06-08 PROCEDURE — 99232 SBSQ HOSP IP/OBS MODERATE 35: CPT | Performed by: NURSE PRACTITIONER

## 2024-06-08 RX ORDER — BACITRACIN ZINC 500 [USP'U]/G
OINTMENT TOPICAL DAILY
Status: COMPLETED | OUTPATIENT
Start: 2024-06-09 | End: 2024-06-15

## 2024-06-08 RX ORDER — CLONIDINE HYDROCHLORIDE 0.1 MG/1
0.1 TABLET ORAL 3 TIMES DAILY PRN
Status: DISCONTINUED | OUTPATIENT
Start: 2024-06-08 | End: 2024-06-08

## 2024-06-08 RX ORDER — CLONIDINE HYDROCHLORIDE 0.1 MG/1
0.1 TABLET ORAL 3 TIMES DAILY PRN
Status: DISCONTINUED | OUTPATIENT
Start: 2024-06-08 | End: 2024-06-15 | Stop reason: HOSPADM

## 2024-06-08 RX ORDER — CLONIDINE HYDROCHLORIDE 0.1 MG/1
0.1 TABLET ORAL 3 TIMES DAILY PRN
COMMUNITY

## 2024-06-08 RX ADMIN — OXYCODONE HYDROCHLORIDE 10 MG: 10 TABLET ORAL at 17:32

## 2024-06-08 RX ADMIN — METAXALONE 800 MG: 800 TABLET ORAL at 11:59

## 2024-06-08 RX ADMIN — HYDROMORPHONE HYDROCHLORIDE 0.5 MG: 1 INJECTION, SOLUTION INTRAMUSCULAR; INTRAVENOUS; SUBCUTANEOUS at 09:44

## 2024-06-08 RX ADMIN — ACETAMINOPHEN 1000 MG: 500 TABLET, FILM COATED ORAL at 23:38

## 2024-06-08 RX ADMIN — ACETAMINOPHEN 1000 MG: 500 TABLET, FILM COATED ORAL at 04:27

## 2024-06-08 RX ADMIN — HEPARIN SODIUM 16 UNITS/KG/HR: 5000 INJECTION, SOLUTION INTRAVENOUS at 11:08

## 2024-06-08 RX ADMIN — OXYCODONE HYDROCHLORIDE 10 MG: 10 TABLET ORAL at 20:33

## 2024-06-08 RX ADMIN — DOCUSATE SODIUM 100 MG: 100 CAPSULE, LIQUID FILLED ORAL at 16:11

## 2024-06-08 RX ADMIN — SENNOSIDES AND DOCUSATE SODIUM 1 TABLET: 50; 8.6 TABLET ORAL at 20:32

## 2024-06-08 RX ADMIN — OXYCODONE HYDROCHLORIDE 10 MG: 10 TABLET ORAL at 13:06

## 2024-06-08 RX ADMIN — METAXALONE 800 MG: 800 TABLET ORAL at 04:27

## 2024-06-08 RX ADMIN — SERTRALINE HYDROCHLORIDE 25 MG: 50 TABLET ORAL at 16:11

## 2024-06-08 RX ADMIN — HEPARIN SODIUM 16 UNITS/KG/HR: 5000 INJECTION, SOLUTION INTRAVENOUS at 23:37

## 2024-06-08 RX ADMIN — GABAPENTIN 300 MG: 300 CAPSULE ORAL at 13:06

## 2024-06-08 RX ADMIN — GABAPENTIN 300 MG: 300 CAPSULE ORAL at 04:27

## 2024-06-08 RX ADMIN — METAXALONE 800 MG: 800 TABLET ORAL at 16:10

## 2024-06-08 RX ADMIN — CLONIDINE HYDROCHLORIDE 0.1 MG: 0.1 TABLET ORAL at 16:10

## 2024-06-08 RX ADMIN — OXYCODONE HYDROCHLORIDE 10 MG: 10 TABLET ORAL at 03:22

## 2024-06-08 RX ADMIN — DOCUSATE SODIUM 100 MG: 100 CAPSULE, LIQUID FILLED ORAL at 04:27

## 2024-06-08 RX ADMIN — GABAPENTIN 300 MG: 300 CAPSULE ORAL at 20:32

## 2024-06-08 RX ADMIN — HEPARIN SODIUM 18 UNITS/KG/HR: 5000 INJECTION, SOLUTION INTRAVENOUS at 07:10

## 2024-06-08 RX ADMIN — ACETAMINOPHEN 1000 MG: 500 TABLET, FILM COATED ORAL at 17:32

## 2024-06-08 RX ADMIN — TRAZODONE HYDROCHLORIDE 50 MG: 50 TABLET ORAL at 23:38

## 2024-06-08 RX ADMIN — OXYCODONE HYDROCHLORIDE 10 MG: 10 TABLET ORAL at 07:39

## 2024-06-08 RX ADMIN — HYDROMORPHONE HYDROCHLORIDE 0.5 MG: 1 INJECTION, SOLUTION INTRAMUSCULAR; INTRAVENOUS; SUBCUTANEOUS at 14:12

## 2024-06-08 RX ADMIN — BACITRACIN ZINC: 500 OINTMENT TOPICAL at 11:28

## 2024-06-08 RX ADMIN — ACETAMINOPHEN 1000 MG: 500 TABLET, FILM COATED ORAL at 11:59

## 2024-06-08 RX ADMIN — HYDROMORPHONE HYDROCHLORIDE 0.5 MG: 1 INJECTION, SOLUTION INTRAMUSCULAR; INTRAVENOUS; SUBCUTANEOUS at 23:06

## 2024-06-08 RX ADMIN — HYDROMORPHONE HYDROCHLORIDE 0.5 MG: 1 INJECTION, SOLUTION INTRAMUSCULAR; INTRAVENOUS; SUBCUTANEOUS at 04:27

## 2024-06-08 RX ADMIN — BACITRACIN ZINC: 500 OINTMENT TOPICAL at 04:27

## 2024-06-08 ASSESSMENT — PAIN DESCRIPTION - PAIN TYPE
TYPE: ACUTE PAIN

## 2024-06-08 ASSESSMENT — ENCOUNTER SYMPTOMS
RESPIRATORY NEGATIVE: 1
NECK PAIN: 1
PSYCHIATRIC NEGATIVE: 1
FOCAL WEAKNESS: 0
EYES NEGATIVE: 1
MYALGIAS: 1
CONSTITUTIONAL NEGATIVE: 1
CARDIOVASCULAR NEGATIVE: 1
GASTROINTESTINAL NEGATIVE: 1
DIZZINESS: 0
DOUBLE VISION: 0
NEUROLOGICAL NEGATIVE: 1

## 2024-06-08 NOTE — CARE PLAN
The patient is Stable - Low risk of patient condition declining or worsening    Problem: Pain - Standard  Goal: Alleviation of pain or a reduction in pain to the patient’s comfort goal  Outcome: Progressing   Working with patient to manage pain while in the hospital, patient verbalized understanding of education.     Problem: Skin Integrity  Goal: Skin integrity is maintained or improved  Outcome: Progressing   Educated patient on interventions to maintain and improve skin integrity while in the hospital, patient verbalized understanding of education.   Shift Goals  Clinical Goals: heparin drip, pain control, safety, rest  Patient Goals: pain control, comfort, rest  Family Goals: comfort    Progress made toward(s) clinical / shift goals:  progressing     Patient is not progressing towards the following goals:

## 2024-06-08 NOTE — WOUND TEAM
[facility]  Inpatient Services  Initial Wound and Skin Care Evaluation    Admission Date: 6/5/2024     Last order of IP CONSULT TO WOUND CARE was found on 6/5/2024 from Hospital Encounter on 6/1/2024     HPI, PMH, SH: Reviewed    History reviewed. No pertinent surgical history.  Social History     Tobacco Use    Smoking status: Never    Smokeless tobacco: Never   Substance Use Topics    Alcohol use: Yes     Chief Complaint   Patient presents with    Trauma Green     Pt BIB EMS from [facility] as trauma Green, pt jumped off golf embankment roughly 10' high. Was seen by Fire and initially AMA, but sister called later due to pt having blurry vision and nausea, A&Ox3, GCS 14.   8cm Laceration to head. Abrasions to left foot.   Unknown LOC. (+) head trauma, (-) thinners.     Diagnosis: Trauma [T14.90XA]    Unit where seen by Wound Team: T317/01     WOUND CONSULT RELATED TO:  Head, BL dorsal feet    WOUND TEAM PLAN OF CARE - Frequency of Follow-up:   Nursing to follow dressing orders written for wound care. Contact wound team if area fails to progress, deteriorates or with any questions/concerns if something comes up before next scheduled follow up (See below as to whether wound is following and frequency of wound follow up)   Not following, consult as needed  - Head, BL dorsal feet    WOUND HISTORY:   Pleasant 39 y.o. male who fell down an embankment.       WOUND ASSESSMENT/LDA  Wound 06/05/24 Full Thickness Wound Foot Dorsal Bilateral & Toes (Active)   Date First Assessed/Time First Assessed: 06/05/24 0549   Present on Original Admission: Yes  Primary Wound Type: Full Thickness Wound  Location: Foot  Wound Orientation: Dorsal  Laterality: Bilateral  Wound Description (Comments): & Toes      Assessments 6/8/2024 12:00 PM   Wound Image      Site Assessment Red;Yellow;Painful   Periwound Assessment Clean;Dry;Intact   Margins Attached edges;Defined edges   Closure Secondary intention   Drainage Amount None    Treatments Cleansed   Wound Cleansing Foam Cleanser/Washcloth   Periwound Protectant Antibiotic Ointment   Dressing Status Clean;Dry;Intact   Dressing Changed New   Dressing Cleansing/Solutions Not Applicable   Dressing Options Petrolatum Gauze (yellow);Telfa;Silicone Adhesive Foam   Dressing Change/Treatment Frequency Daily, and As Needed   NEXT Dressing Change/Treatment Date 06/09/24   NEXT Weekly Photo (Inpatient Only) 06/15/24   Wound Team Following Not following   Non-staged Wound Description Full thickness        Vascular:    OLGA:   No results found.    Lab Values:    Lab Results   Component Value Date/Time    WBC 5.8 06/08/2024 01:47 AM    RBC 3.95 (L) 06/08/2024 01:47 AM    HEMOGLOBIN 12.9 (L) 06/08/2024 01:47 AM    HEMATOCRIT 38.0 (L) 06/08/2024 01:47 AM         Culture Results show:  No results found for this or any previous visit (from the past 720 hour(s)).    Pain Level/Medicated:  None, Tolerated without pain medication       INTERVENTIONS BY WOUND TEAM:  Chart and images reviewed. Discussed with bedside RN. All areas of concern (based on picture review, LDA review and discussion with bedside RN) have been thoroughly assessed. Documentation of areas based on significant findings. This RN in to assess patient. Performed standard wound care which includes appropriate positioning, dressing removal and non-selective debridement. Pictures and measurements obtained weekly if/when required.    Wound:  BL Dorsal feet & toes  Preparation for Dressing removal: Open to air  Cleansed/Non-selectively Debrided with:  No rinse foam soap and Moist warm washcloth  Kathrin wound: Cleansed with No rinse foam soap and Moist warm washcloth, Prepped with N/A  Primary Dressing:  Bacitracin ointment & Xeroform  Secondary (Outer) Dressing: Silicone adhesive foam over dorsal feet. Telfa wrapped around the toes.    Advanced Wound Care Discharge Planning  Number of Clinicians necessary to complete wound care: 1  Is patient  requiring IV pain medications for dressing changes:  No   Length of time for dressing change 30 min. (This does not include chart review, pre-medication time, set up, clean up or time spent charting.)    Interdisciplinary consultation: Patient, Bedside RN, Beny GAMBOA (Wound RN).     EVALUATION / RATIONALE FOR TREATMENT:     Date:  06/08/24  Wound Status:  Initial evaluation    Head laceration that was repaired in ED, no further interventions indicated at this time.  BL dorsal feet & toes with full thickness abrasions. Combination of bacitracin ointment and Xeroform applied for antimicrobial effect in combination with added moisture to maintain a more optimal wound microclimate for natural sloughing of nonviable tissue.          Goals: Steady decrease in wound area and depth weekly.    NURSING PLAN OF CARE ORDERS:  Dressing changes: See Dressing Care orders    NUTRITION RECOMMENDATIONS   Wound Team Recommendations:  N/A    DIET ORDERS (From admission to next 24h)       Start     Ordered    06/06/24 1000  Diet Order Diet: Regular  ALL MEALS        Question:  Diet:  Answer:  Regular    06/06/24 1000                    PREVENTATIVE INTERVENTIONS:    Q shift Angel - performed per nursing policy  Q shift pressure point assessments - performed per nursing policy    Surface/Positioning  Standard/trauma mattress - Currently in Place    Mobilization      Ambulate      Anticipated discharge plans:  TBD        Vac Discharge Needs:  Vac Discharge plan is purely a recommendation from wound team and not a requirement for discharge unless otherwise stated by physician.  Not Applicable Pt not on a wound vac

## 2024-06-08 NOTE — CARE PLAN
Problem: Pain - Standard  Goal: Alleviation of pain or a reduction in pain to the patient’s comfort goal  Outcome: Progressing     Problem: Knowledge Deficit - Standard  Goal: Patient and family/care givers will demonstrate understanding of plan of care, disease process/condition, diagnostic tests and medications  Outcome: Progressing       The patient is Stable - Low risk of patient condition declining or worsening    Shift Goals  Clinical Goals: Pain control, heparin drip  Patient Goals: Pain control  Family Goals: comfort    Progress made toward(s) clinical / shift goals:  Taught pt 0-10 pain scale and non-pharmacological method of pain management, encouraged to verbalize when in pain. Administered PRN pain meds as needed. Monitoring heparin drip and anti-xa labs per orders.     Patient is not progressing towards the following goals:

## 2024-06-08 NOTE — PROGRESS NOTES
Neurosurgery Progress Note    Subjective:  No acute events overnight, still c/o pain to feet  States he is anxious to get surgery over with    Exam:  AAO, collar on, cooperative,   trace left tricep weakness  Sensation intact touch 4 extremities    BP  Min: 99/52  Max: 108/60  Pulse  Av.8  Min: 67  Max: 84  Resp  Av.3  Min: 16  Max: 18  Temp  Av.6 °C (97.9 °F)  Min: 36.5 °C (97.7 °F)  Max: 36.7 °C (98.1 °F)  SpO2  Av.8 %  Min: 92 %  Max: 94 %    No data recorded    Recent Labs     24  0543 24  0039 24  0147   WBC 5.7 5.7 5.8   RBC 3.57* 3.94* 3.95*   HEMOGLOBIN 11.8* 12.9* 12.9*   HEMATOCRIT 33.9* 37.1* 38.0*   MCV 95.0 94.2 96.2   MCH 33.1* 32.7 32.7   MCHC 34.8 34.8 33.9   RDW 44.6 44.0 46.2   PLATELETCT 170 185 182   MPV 9.2 9.7 10.1     Recent Labs     24  0543 24  0039 24  0147   SODIUM 142 141 140   POTASSIUM 3.9 4.0 4.0   CHLORIDE 113* 109 107   CO2 19* 22 20   GLUCOSE 113* 107* 113*   BUN 13 12 13   CREATININE 0.76 0.74 0.61   CALCIUM 8.0* 7.9* 8.4*     Recent Labs     247   APTT 26.2   INR 1.05           Intake/Output                         24 07 - 24 0659 24 - 2459      Total  Total                 Intake    P.O.  240  -- 240  --  -- --    P.O. 240 -- 240 -- -- --    Total Intake 240 -- 240 -- -- --       Output    Urine  --  600 600  --  -- --    Number of Times Voided -- 2 x 2 x -- -- --    Urine Void (mL) -- 600 600 -- -- --    Stool  1  -- 1  --  -- --    Number of Times Stooled -- 1 x 1 x -- -- --    Measurable Stool (mL) 1 -- 1 -- -- --    Total Output 1 600 601 -- -- --       Net I/O     239 -600 -361 -- -- --              Intake/Output Summary (Last 24 hours) at 2024 0893  Last data filed at 2024 0415  Gross per 24 hour   Intake 240 ml   Output 601 ml   Net -361 ml             gabapentin  300 mg Q8HRS    oxyCODONE immediate-release  5 mg Q3HRS PRN    Or     oxyCODONE immediate-release  10 mg Q3HRS PRN    Or    HYDROmorphone  0.5 mg Q3HRS PRN    traZODone  50 mg QHS    heparin  0-30 Units/kg/hr Continuous    heparin  40 Units/kg PRN    Respiratory Therapy Consult   Continuous RT    Pharmacy Consult Request  1 Each PHARMACY TO DOSE    ondansetron  4 mg Q4HRS PRN    ondansetron  4 mg Q4HRS PRN    docusate sodium  100 mg BID    senna-docusate  1 Tablet Nightly    senna-docusate  1 Tablet Q24HRS PRN    polyethylene glycol/lytes  1 Packet BID    magnesium hydroxide  30 mL DAILY AT 1800    bisacodyl  10 mg Q24HRS PRN    sodium phosphate  1 Each Once PRN    acetaminophen  1,000 mg Q6HRS    Followed by    [START ON 6/10/2024] acetaminophen  1,000 mg Q6HRS PRN    metaxalone  800 mg TID    bacitracin   BID       Assessment and Plan:  POD #0 Left C7 facet fracture, with jumped facet C6/7  NM as above - stable  OR next Wed; was postponed by Anesthesia due to + cocaine, amphetamines  Prophylactic anticoagulation: yes         Start date/time: per Trauma     Brain Compression: No  Ok to ambulate  Cervical collar on all times

## 2024-06-08 NOTE — PROGRESS NOTES
Trauma / Surgical Daily Progress Note    Date of Service  6/8/2024    Chief Complaint  39 y.o. male admitted 6/5/2024 with     Interval Events  No acute events overnight  Waiting on surgery scheduled for June 12    Monitor for neuro changes while on hep    Review of Systems  Review of Systems   Constitutional: Negative.    Eyes: Negative.  Negative for double vision.   Respiratory: Negative.     Cardiovascular: Negative.    Gastrointestinal: Negative.    Genitourinary: Negative.    Musculoskeletal:  Positive for myalgias and neck pain.   Neurological: Negative.  Negative for dizziness and focal weakness.   Psychiatric/Behavioral: Negative.     All other systems reviewed and are negative.       Vital Signs  Temp:  [36.5 °C (97.7 °F)-36.7 °C (98.1 °F)] 36.5 °C (97.7 °F)  Pulse:  [67-84] 73  Resp:  [16-18] 17  BP: ()/(52-62) 101/62  SpO2:  [92 %-94 %] 93 %    Physical Exam  Physical Exam  Vitals and nursing note reviewed.   Constitutional:       General: He is awake.      Appearance: Normal appearance. He is well-developed. He is not diaphoretic.      Interventions: Cervical collar in place.   HENT:      Head: Normocephalic.   Pulmonary:      Effort: Pulmonary effort is normal. No respiratory distress.   Chest:      Chest wall: No deformity, swelling, tenderness or crepitus.   Abdominal:      General: Abdomen is flat.      Palpations: Abdomen is soft.   Genitourinary:     Comments: Voiding.   Musculoskeletal:         General: Tenderness present. Normal range of motion.      Cervical back: Neck supple. Spinous process tenderness present.      Comments: 5/5 upper and lower extremity strength bilaterally.    Skin:     General: Skin is warm and dry.      Capillary Refill: Capillary refill takes less than 2 seconds.      Findings: Abrasion present.      Comments: Superficial abrasions to bilateral anterior feet.    Neurological:      Mental Status: He is alert and oriented to person, place, and time. Mental status  is at baseline.      Comments: Left 2nd digit paraesthesia.    Psychiatric:         Mood and Affect: Mood normal.         Behavior: Behavior is cooperative.         Laboratory  Recent Results (from the past 24 hour(s))   CBC with Differential: Tomorrow AM    Collection Time: 06/08/24  1:47 AM   Result Value Ref Range    WBC 5.8 4.8 - 10.8 K/uL    RBC 3.95 (L) 4.70 - 6.10 M/uL    Hemoglobin 12.9 (L) 14.0 - 18.0 g/dL    Hematocrit 38.0 (L) 42.0 - 52.0 %    MCV 96.2 81.4 - 97.8 fL    MCH 32.7 27.0 - 33.0 pg    MCHC 33.9 32.3 - 36.5 g/dL    RDW 46.2 35.9 - 50.0 fL    Platelet Count 182 164 - 446 K/uL    MPV 10.1 9.0 - 12.9 fL    Neutrophils-Polys 42.40 (L) 44.00 - 72.00 %    Lymphocytes 43.00 (H) 22.00 - 41.00 %    Monocytes 10.40 0.00 - 13.40 %    Eosinophils 3.00 0.00 - 6.90 %    Basophils 0.30 0.00 - 1.80 %    Immature Granulocytes 0.90 0.00 - 0.90 %    Nucleated RBC 0.00 0.00 - 0.20 /100 WBC    Neutrophils (Absolute) 2.44 1.82 - 7.42 K/uL    Lymphs (Absolute) 2.47 1.00 - 4.80 K/uL    Monos (Absolute) 0.60 0.00 - 0.85 K/uL    Eos (Absolute) 0.17 0.00 - 0.51 K/uL    Baso (Absolute) 0.02 0.00 - 0.12 K/uL    Immature Granulocytes (abs) 0.05 0.00 - 0.11 K/uL    NRBC (Absolute) 0.00 K/uL   Comp Metabolic Panel (CMP): Tomorrow AM    Collection Time: 06/08/24  1:47 AM   Result Value Ref Range    Sodium 140 135 - 145 mmol/L    Potassium 4.0 3.6 - 5.5 mmol/L    Chloride 107 96 - 112 mmol/L    Co2 20 20 - 33 mmol/L    Anion Gap 13.0 7.0 - 16.0    Glucose 113 (H) 65 - 99 mg/dL    Bun 13 8 - 22 mg/dL    Creatinine 0.61 0.50 - 1.40 mg/dL    Calcium 8.4 (L) 8.5 - 10.5 mg/dL    Correct Calcium 8.6 8.5 - 10.5 mg/dL    AST(SGOT) 16 12 - 45 U/L    ALT(SGPT) 13 2 - 50 U/L    Alkaline Phosphatase 73 30 - 99 U/L    Total Bilirubin <0.2 0.1 - 1.5 mg/dL    Albumin 3.7 3.2 - 4.9 g/dL    Total Protein 6.1 6.0 - 8.2 g/dL    Globulin 2.4 1.9 - 3.5 g/dL    A-G Ratio 1.5 g/dL   Phosphorus: Every Monday and Thursday AM    Collection Time:  06/08/24  1:47 AM   Result Value Ref Range    Phosphorus 2.7 2.5 - 4.5 mg/dL   ESTIMATED GFR    Collection Time: 06/08/24  1:47 AM   Result Value Ref Range    GFR (CKD-EPI) 125 >60 mL/min/1.73 m 2   Heparin Xa (Unfractionated)    Collection Time: 06/08/24 10:16 AM   Result Value Ref Range    Heparin Xa (UFH) 0.74 IU/mL       Fluids    Intake/Output Summary (Last 24 hours) at 6/8/2024 1137  Last data filed at 6/8/2024 1106  Gross per 24 hour   Intake 540 ml   Output 601 ml   Net -61 ml       Core Measures & Quality Metrics  Radiology images reviewed, Labs reviewed and Medications reviewed  Kaiser catheter: No Kaiser      DVT Prophylaxis: Heparin  DVT prophylaxis - mechanical: SCDs  Ulcer prophylaxis: No    Assessed for rehab: Patient was assess for and/or received rehabilitation services during this hospitalization    RAP Score Total: 4    CAGE Results: positive Blood Alcohol>0.08: no CAGE Score: 0  Total: POSITIVE  Assessment complete date: 6/5/2024  Intervention: Complete. Patient response to intervention: patient rreports he uses substances recreationally, declines intervention..   Patient demonstrates understanding of intervention. Patient does not agree to follow-up.   has not been contacted. Total ETOH intervention time: 15 - 30 mintues      Assessment/Plan  * Trauma- (present on admission)  Assessment & Plan  Jumped off an embankment ~ 10 ft height.  Trauma Green Activation.  Cosmo Ruiz MD. Trauma Surgery.     Acute deep vein thrombosis (DVT) of left lower extremity (HCC)- (present on admission)  Assessment & Plan  Subacute and chronic left lower extremity DVT noted on LE Duplex US (6/6).  Patient has a known history of DVT and has completed a 6mo course of Xarelto.   6/6 Heparin gtt initiated and okayed by neurosurgery.  Anti-Xa protocol.      Closed fracture of seventh cervical vertebra (HCC)- (present on admission)  Assessment & Plan  Left C7 superior facet fracture with jumped facet at  C6/C7.  2.7 mm anterolisthesis C6 on C7  CTA within normal limits.  Definitive operative reduction and stabilization pending. 6/12  Cervical immobilization. Ok to mobilize with collar securely on.    Mitch Leon MD, PhD. Neurosurgeon. [facility].    Fracture three ribs-closed, right, initial encounter- (present on admission)  Assessment & Plan  subtle anterior right fourth through sixth rib fractures.  Aggressive multimodal pain management, and pulmonary hygiene. Serial chest radiographs.    No contraindication to deep vein thrombosis (DVT) prophylaxis- (present on admission)  Assessment & Plan  VTE prophylaxis initially contraindicated secondary to elevated bleeding risk.  6/6 Left lower extremity DVT identified. Heparin gtt initiated.    Laceration of head- (present on admission)  Assessment & Plan  Repaired in ED.        Discussed patient condition with RN and trauma surgery Dr. Ruiz.

## 2024-06-09 ENCOUNTER — APPOINTMENT (OUTPATIENT)
Dept: RADIOLOGY | Facility: MEDICAL CENTER | Age: 39
DRG: 519 | End: 2024-06-09
Attending: REGISTERED NURSE
Payer: COMMERCIAL

## 2024-06-09 PROBLEM — Z72.0 TOBACCO USE: Status: ACTIVE | Noted: 2024-06-09

## 2024-06-09 LAB
ALBUMIN SERPL BCP-MCNC: 3.9 G/DL (ref 3.2–4.9)
ALBUMIN/GLOB SERPL: 1.6 G/DL
ALP SERPL-CCNC: 79 U/L (ref 30–99)
ALT SERPL-CCNC: 18 U/L (ref 2–50)
ANION GAP SERPL CALC-SCNC: 11 MMOL/L (ref 7–16)
AST SERPL-CCNC: 19 U/L (ref 12–45)
BASOPHILS # BLD AUTO: 0.6 % (ref 0–1.8)
BASOPHILS # BLD: 0.03 K/UL (ref 0–0.12)
BILIRUB SERPL-MCNC: 0.2 MG/DL (ref 0.1–1.5)
BUN SERPL-MCNC: 11 MG/DL (ref 8–22)
CALCIUM ALBUM COR SERPL-MCNC: 9 MG/DL (ref 8.5–10.5)
CALCIUM SERPL-MCNC: 8.9 MG/DL (ref 8.5–10.5)
CHLORIDE SERPL-SCNC: 103 MMOL/L (ref 96–112)
CO2 SERPL-SCNC: 23 MMOL/L (ref 20–33)
CREAT SERPL-MCNC: 0.7 MG/DL (ref 0.5–1.4)
EOSINOPHIL # BLD AUTO: 0.19 K/UL (ref 0–0.51)
EOSINOPHIL NFR BLD: 3.9 % (ref 0–6.9)
ERYTHROCYTE [DISTWIDTH] IN BLOOD BY AUTOMATED COUNT: 46.1 FL (ref 35.9–50)
GFR SERPLBLD CREATININE-BSD FMLA CKD-EPI: 120 ML/MIN/1.73 M 2
GLOBULIN SER CALC-MCNC: 2.4 G/DL (ref 1.9–3.5)
GLUCOSE SERPL-MCNC: 115 MG/DL (ref 65–99)
HCT VFR BLD AUTO: 38 % (ref 42–52)
HGB BLD-MCNC: 12.9 G/DL (ref 14–18)
IMM GRANULOCYTES # BLD AUTO: 0.07 K/UL (ref 0–0.11)
IMM GRANULOCYTES NFR BLD AUTO: 1.4 % (ref 0–0.9)
LYMPHOCYTES # BLD AUTO: 1.66 K/UL (ref 1–4.8)
LYMPHOCYTES NFR BLD: 33.9 % (ref 22–41)
MCH RBC QN AUTO: 32.4 PG (ref 27–33)
MCHC RBC AUTO-ENTMCNC: 33.9 G/DL (ref 32.3–36.5)
MCV RBC AUTO: 95.5 FL (ref 81.4–97.8)
MONOCYTES # BLD AUTO: 0.64 K/UL (ref 0–0.85)
MONOCYTES NFR BLD AUTO: 13.1 % (ref 0–13.4)
NEUTROPHILS # BLD AUTO: 2.31 K/UL (ref 1.82–7.42)
NEUTROPHILS NFR BLD: 47.1 % (ref 44–72)
NRBC # BLD AUTO: 0 K/UL
NRBC BLD-RTO: 0 /100 WBC (ref 0–0.2)
PHOSPHATE SERPL-MCNC: 3.4 MG/DL (ref 2.5–4.5)
PLATELET # BLD AUTO: 223 K/UL (ref 164–446)
PMV BLD AUTO: 9.5 FL (ref 9–12.9)
POTASSIUM SERPL-SCNC: 4 MMOL/L (ref 3.6–5.5)
PROT SERPL-MCNC: 6.3 G/DL (ref 6–8.2)
RBC # BLD AUTO: 3.98 M/UL (ref 4.7–6.1)
SODIUM SERPL-SCNC: 137 MMOL/L (ref 135–145)
UFH PPP CHRO-ACNC: 0.33 IU/ML
WBC # BLD AUTO: 4.9 K/UL (ref 4.8–10.8)

## 2024-06-09 PROCEDURE — 700111 HCHG RX REV CODE 636 W/ 250 OVERRIDE (IP)

## 2024-06-09 PROCEDURE — 700102 HCHG RX REV CODE 250 W/ 637 OVERRIDE(OP): Performed by: REGISTERED NURSE

## 2024-06-09 PROCEDURE — 99231 SBSQ HOSP IP/OBS SF/LOW 25: CPT | Performed by: NURSE PRACTITIONER

## 2024-06-09 PROCEDURE — 80053 COMPREHEN METABOLIC PANEL: CPT

## 2024-06-09 PROCEDURE — A9270 NON-COVERED ITEM OR SERVICE: HCPCS

## 2024-06-09 PROCEDURE — A9270 NON-COVERED ITEM OR SERVICE: HCPCS | Performed by: SURGERY

## 2024-06-09 PROCEDURE — 770001 HCHG ROOM/CARE - MED/SURG/GYN PRIV*

## 2024-06-09 PROCEDURE — 85025 COMPLETE CBC W/AUTO DIFF WBC: CPT

## 2024-06-09 PROCEDURE — 84100 ASSAY OF PHOSPHORUS: CPT

## 2024-06-09 PROCEDURE — A9270 NON-COVERED ITEM OR SERVICE: HCPCS | Performed by: REGISTERED NURSE

## 2024-06-09 PROCEDURE — 700102 HCHG RX REV CODE 250 W/ 637 OVERRIDE(OP)

## 2024-06-09 PROCEDURE — A9270 NON-COVERED ITEM OR SERVICE: HCPCS | Performed by: NURSE PRACTITIONER

## 2024-06-09 PROCEDURE — 700111 HCHG RX REV CODE 636 W/ 250 OVERRIDE (IP): Performed by: REGISTERED NURSE

## 2024-06-09 PROCEDURE — 36415 COLL VENOUS BLD VENIPUNCTURE: CPT

## 2024-06-09 PROCEDURE — 71045 X-RAY EXAM CHEST 1 VIEW: CPT

## 2024-06-09 PROCEDURE — 700102 HCHG RX REV CODE 250 W/ 637 OVERRIDE(OP): Performed by: SURGERY

## 2024-06-09 PROCEDURE — 700102 HCHG RX REV CODE 250 W/ 637 OVERRIDE(OP): Performed by: NURSE PRACTITIONER

## 2024-06-09 PROCEDURE — 92523 SPEECH SOUND LANG COMPREHEN: CPT

## 2024-06-09 PROCEDURE — 85520 HEPARIN ASSAY: CPT

## 2024-06-09 RX ORDER — NICOTINE 21 MG/24HR
14 PATCH, TRANSDERMAL 24 HOURS TRANSDERMAL
Status: DISCONTINUED | OUTPATIENT
Start: 2024-06-09 | End: 2024-06-15 | Stop reason: HOSPADM

## 2024-06-09 RX ADMIN — SERTRALINE HYDROCHLORIDE 25 MG: 50 TABLET ORAL at 05:22

## 2024-06-09 RX ADMIN — MAGNESIUM HYDROXIDE 30 ML: 1200 LIQUID ORAL at 16:40

## 2024-06-09 RX ADMIN — DOCUSATE SODIUM 100 MG: 100 CAPSULE, LIQUID FILLED ORAL at 05:23

## 2024-06-09 RX ADMIN — OXYCODONE HYDROCHLORIDE 10 MG: 10 TABLET ORAL at 05:22

## 2024-06-09 RX ADMIN — TRAZODONE HYDROCHLORIDE 50 MG: 50 TABLET ORAL at 23:59

## 2024-06-09 RX ADMIN — HYDROMORPHONE HYDROCHLORIDE 0.5 MG: 1 INJECTION, SOLUTION INTRAMUSCULAR; INTRAVENOUS; SUBCUTANEOUS at 22:14

## 2024-06-09 RX ADMIN — POLYETHYLENE GLYCOL 3350 1 PACKET: 17 POWDER, FOR SOLUTION ORAL at 16:40

## 2024-06-09 RX ADMIN — NICOTINE POLACRILEX 2 MG: 2 GUM, CHEWING BUCCAL at 05:23

## 2024-06-09 RX ADMIN — METAXALONE 800 MG: 800 TABLET ORAL at 05:22

## 2024-06-09 RX ADMIN — SENNOSIDES AND DOCUSATE SODIUM 1 TABLET: 50; 8.6 TABLET ORAL at 20:55

## 2024-06-09 RX ADMIN — GABAPENTIN 300 MG: 300 CAPSULE ORAL at 14:49

## 2024-06-09 RX ADMIN — ACETAMINOPHEN 1000 MG: 500 TABLET, FILM COATED ORAL at 18:40

## 2024-06-09 RX ADMIN — HEPARIN SODIUM 16 UNITS/KG/HR: 5000 INJECTION, SOLUTION INTRAVENOUS at 16:47

## 2024-06-09 RX ADMIN — NICOTINE POLACRILEX 2 MG: 2 GUM, CHEWING BUCCAL at 08:08

## 2024-06-09 RX ADMIN — HYDROMORPHONE HYDROCHLORIDE 0.5 MG: 1 INJECTION, SOLUTION INTRAMUSCULAR; INTRAVENOUS; SUBCUTANEOUS at 03:46

## 2024-06-09 RX ADMIN — GABAPENTIN 300 MG: 300 CAPSULE ORAL at 05:23

## 2024-06-09 RX ADMIN — OXYCODONE HYDROCHLORIDE 10 MG: 10 TABLET ORAL at 16:40

## 2024-06-09 RX ADMIN — OXYCODONE HYDROCHLORIDE 10 MG: 10 TABLET ORAL at 01:37

## 2024-06-09 RX ADMIN — OXYCODONE HYDROCHLORIDE 10 MG: 10 TABLET ORAL at 23:59

## 2024-06-09 RX ADMIN — ACETAMINOPHEN 1000 MG: 500 TABLET, FILM COATED ORAL at 23:58

## 2024-06-09 RX ADMIN — METAXALONE 800 MG: 800 TABLET ORAL at 16:40

## 2024-06-09 RX ADMIN — OXYCODONE HYDROCHLORIDE 10 MG: 10 TABLET ORAL at 20:56

## 2024-06-09 RX ADMIN — ACETAMINOPHEN 1000 MG: 500 TABLET, FILM COATED ORAL at 12:22

## 2024-06-09 RX ADMIN — OXYCODONE HYDROCHLORIDE 10 MG: 10 TABLET ORAL at 08:42

## 2024-06-09 RX ADMIN — OXYCODONE HYDROCHLORIDE 10 MG: 10 TABLET ORAL at 12:22

## 2024-06-09 RX ADMIN — NICOTINE POLACRILEX 2 MG: 2 GUM, CHEWING BUCCAL at 03:46

## 2024-06-09 RX ADMIN — METAXALONE 800 MG: 800 TABLET ORAL at 12:22

## 2024-06-09 RX ADMIN — HYDROMORPHONE HYDROCHLORIDE 0.5 MG: 1 INJECTION, SOLUTION INTRAMUSCULAR; INTRAVENOUS; SUBCUTANEOUS at 19:12

## 2024-06-09 RX ADMIN — GABAPENTIN 300 MG: 300 CAPSULE ORAL at 20:56

## 2024-06-09 RX ADMIN — HYDROMORPHONE HYDROCHLORIDE 0.5 MG: 1 INJECTION, SOLUTION INTRAMUSCULAR; INTRAVENOUS; SUBCUTANEOUS at 14:49

## 2024-06-09 RX ADMIN — DOCUSATE SODIUM 100 MG: 100 CAPSULE, LIQUID FILLED ORAL at 16:40

## 2024-06-09 RX ADMIN — NICOTINE 14 MG: 14 PATCH TRANSDERMAL at 05:23

## 2024-06-09 RX ADMIN — BACITRACIN ZINC: 500 OINTMENT TOPICAL at 05:46

## 2024-06-09 RX ADMIN — ACETAMINOPHEN 1000 MG: 500 TABLET, FILM COATED ORAL at 05:22

## 2024-06-09 ASSESSMENT — ENCOUNTER SYMPTOMS
CONSTITUTIONAL NEGATIVE: 1
NEUROLOGICAL NEGATIVE: 1
RESPIRATORY NEGATIVE: 1
DIZZINESS: 0
EYES NEGATIVE: 1
MYALGIAS: 1
PSYCHIATRIC NEGATIVE: 1
GASTROINTESTINAL NEGATIVE: 1
FOCAL WEAKNESS: 0
CARDIOVASCULAR NEGATIVE: 1
DOUBLE VISION: 0
NECK PAIN: 1

## 2024-06-09 ASSESSMENT — PAIN DESCRIPTION - PAIN TYPE
TYPE: ACUTE PAIN

## 2024-06-09 NOTE — THERAPY
"Speech Language Pathology   Cognitive Evaluation     Patient Name: Salvador Romo  AGE:  39 y.o., SEX:  male  Medical Record #: [id number]  Date of Service: 6/9/2024      History of Present Illness  38 y/o male admitted 6/5 for fall off 10' embankment and head strike. Reports blurry vision and nausea, amnesic to the injury.     CMHx: Trauma, C7 fracture, R rib fractures, head laceration  PMHx: Drug use    No hx SLP in [facility].    CT Head 6/5:  \"1.  No acute intracranial abnormality.\"    CT C-Spine 6/5:  \"1.  Left C7 superior facet fracture with jumped facet at C6/C7.  2.  Recommend follow-up CT angiogram of the neck to evaluate for vertebral artery injury  3.  2.7 mm anterolisthesis C6 on C7\"    General Information  Vitals  O2 Delivery Device: None - Room Air  Level of Consciousness: Alert, Awake  Patient Behaviors: Anxious  Orientation: Oriented x 4  Follows Directives: Yes      Prior Living Situation & Level of Function  Prior Services: None  Housing / Facility: [address]  Lives with - Patient's Self Care Capacity: Alone and Able to Care For Self  Comments: Reports he lives alone, I with IADLs at baseline. Reports plans to live with \"lady friend\" after d/c per EMR. 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  Communication: WFL - I with IADLs at baseline  Swallowing: WFL     Oral Mechanism Evaluation  Facial Symmetry: Equal  Labial Observations: WFL  Lingual Observations: Midline  Motor Speech: WFL - 100 % intelligible at conversational level, no concern for apraxia or dysarthria  Voice Quality: WFL      Subjective  Pt agreeable and cooperative with SLP evaluation tasks. Reports that tasks were not more difficult than his baseline, reports sufficient cognition/language for return to I with IADLs and RTW.      Communication Domain(s)  Expressive Language: WFL  Receptive Language: WFL  Cognitive-Linguistic: Mild  Reading: WFL  Motor Speech: " WFL      Assessment  The patient was seen this date for a cognitive evaluation.    Cognistat  Orientation: Average  Attention: Average  Repetition: Average  Naming: Average  Memory: Mild   Able to name 2/4 independently after 5min delay, advancing to 4/4 with field of three choices  Calculations: Average  Similarities: Average  Judgement: Average    Other Non-Standard Measures  1-step commands: 100%  2-step commands: 100%  3-step commands: 75%  Picture Description Task: Appropriate language content and fluency. No inattention. Able to identify problems in the picture and offer appropriate solutions.      Medication Management  Pt demonstrated appropriate reading fluency and reading comprehension at the multi-sentence level during medication management task. Good synthesis of information. Answered temporal and numerical reasoning tasks with 100% accuracy. Excellent problem solving demonstrated when asked for a memory enhancing strategy. Pt does report taking routine medications at home; described home medication management in detail and appropriately. Good judgement and divergent thinking when asked to list questions they would want to ask about a newly prescribed medication.       Clinical Impressions  Per results of formal and informal cognitive evaluations, the patient presents with cognition consistent with or approaching their baseline level of functioning. Therefore, no further acute speech pathology services are indicated at this time.  Discussed with patient that, should higher level deficits impact the patient's ability to resume premorbid activities, recommend outpatient speech therapy follow up; however, strongly suspect that he will be able to return to  with IADLs upon discharge based on cognitive ability. Patient verbalized understanding and agreement.       NOTE: It is not within the scope of practice of Speech-Language Pathologists to determine patient capacity. Please defer to the physician or psych  to complete this assessment.       Recommendations  Supervision Needs Upons Discharge: None  IADLs: N/A         SLP Treatment Plan  Treatment Plan: None Indicated  SLP Frequency: N/A - Evaluation Only  Estimated Duration: N/A - Evaluation Only      Anticipated Discharge Needs  Discharge Recommendations: Anticipate that the patient will have no further speech therapy needs after discharge from the hospital  Therapy Recommendations Upon DC: Not Indicated      Isabela Estrada, SLP

## 2024-06-09 NOTE — CARE PLAN
The patient is Stable - Low risk of patient condition declining or worsening    Shift Goals  Clinical Goals: pain control, heparin drip  Patient Goals: pain control, comfort  Family Goals: not present    Progress made toward(s) clinical / shift goals:    Problem: Pain - Standard  Goal: Alleviation of pain or a reduction in pain to the patient’s comfort goal  Outcome: Progressing     Problem: Knowledge Deficit - Standard  Goal: Patient and family/care givers will demonstrate understanding of plan of care, disease process/condition, diagnostic tests and medications  Outcome: Progressing     Problem: Skin Integrity  Goal: Skin integrity is maintained or improved  Outcome: Progressing     Pt encouraged to turn from one side to the other and avoid laying supine for long periods of time.    Problem: Fall Risk  Goal: Patient will remain free from falls  Outcome: Progressing      Pt educated to call staff when assistance is needed. DME in bathroom, treaded socks placed on patient    Problem: Neuro Status  Goal: Neuro status will remain stable or improve  Outcome: Progressing     Q4h neuro checks initiated.    Patient is not progressing towards the following goals:

## 2024-06-09 NOTE — PROGRESS NOTES
Neurosurgery Progress Note    Subjective:  No acute events overnight, still c/o pain to feet  Per notes from earlier today, pt has had cannabis chocolate as well as using vape pen    Exam:  AAO, collar on, cooperative,   trace left tricep weakness  Sensation intact touch 4 extremities    BP  Min: 106/68  Max: 120/69  Pulse  Av  Min: 75  Max: 80  Resp  Av  Min: 14  Max: 18  Temp  Av.7 °C (98.1 °F)  Min: 36.7 °C (98.1 °F)  Max: 36.8 °C (98.2 °F)  SpO2  Av.3 %  Min: 94 %  Max: 96 %    No data recorded    Recent Labs     24  0039 24  0055   WBC 5.7 5.8 4.9   RBC 3.94* 3.95* 3.98*   HEMOGLOBIN 12.9* 12.9* 12.9*   HEMATOCRIT 37.1* 38.0* 38.0*   MCV 94.2 96.2 95.5   MCH 32.7 32.7 32.4   MCHC 34.8 33.9 33.9   RDW 44.0 46.2 46.1   PLATELETCT 185 182 223   MPV 9.7 10.1 9.5     Recent Labs     24  0039 247 24  0055   SODIUM 141 140 137   POTASSIUM 4.0 4.0 4.0   CHLORIDE 109 107 103   CO2 22 20 23   GLUCOSE 107* 113* 115*   BUN 12 13 11   CREATININE 0.74 0.61 0.70   CALCIUM 7.9* 8.4* 8.9     Recent Labs     247   APTT 26.2   INR 1.05           Intake/Output                         24 - 24 0659 24 - 06/10/24 0659      Total  Total                 Intake    P.O.  300  -- 300  --  -- --    P.O. 300 -- 300 -- -- --    Total Intake 300 -- 300 -- -- --       Output    Urine  --  650 650  --  -- --    Number of Times Voided -- 1 x 1 x -- -- --    Urine Void (mL) -- 650 650 -- -- --    Stool  --  -- --  --  -- --    Number of Times Stooled 3 x -- 3 x -- -- --    Total Output -- 650 650 -- -- --       Net I/O     300 -650 -350 -- -- --              Intake/Output Summary (Last 24 hours) at 2024 0832  Last data filed at 2024 0339  Gross per 24 hour   Intake 300 ml   Output 650 ml   Net -350 ml             nicotine  14 mg Daily-0600    And    nicotine polacrilex  2 mg Q HOUR PRN     bacitracin   DAILY    sertraline  25 mg DAILY    cloNIDine  0.1 mg TID PRN    gabapentin  300 mg Q8HRS    oxyCODONE immediate-release  5 mg Q3HRS PRN    Or    oxyCODONE immediate-release  10 mg Q3HRS PRN    Or    HYDROmorphone  0.5 mg Q3HRS PRN    traZODone  50 mg QHS    heparin  0-30 Units/kg/hr Continuous    heparin  40 Units/kg PRN    Respiratory Therapy Consult   Continuous RT    Pharmacy Consult Request  1 Each PHARMACY TO DOSE    ondansetron  4 mg Q4HRS PRN    ondansetron  4 mg Q4HRS PRN    docusate sodium  100 mg BID    senna-docusate  1 Tablet Nightly    senna-docusate  1 Tablet Q24HRS PRN    polyethylene glycol/lytes  1 Packet BID    magnesium hydroxide  30 mL DAILY AT 1800    bisacodyl  10 mg Q24HRS PRN    sodium phosphate  1 Each Once PRN    acetaminophen  1,000 mg Q6HRS    Followed by    [START ON 6/10/2024] acetaminophen  1,000 mg Q6HRS PRN    metaxalone  800 mg TID       Assessment and Plan:  POD #0 Left C7 facet fracture, with jumped facet C6/7  NM as above - stable  OR next Wed; was postponed by Anesthesia due to + cocaine, amphetamines  Per notes from earlier today, pt has had cannabis chocolate as well as using vape pen    Prophylactic anticoagulation: yes         Start date/time: on Heparin      Brain Compression: No  Ok to ambulate  Cervical collar on all times

## 2024-06-09 NOTE — PROGRESS NOTES
2050:    Noticed on patients tray that he had an empty cannabis chocolate bar. Asked patient if he had eaten it, patient stated he had earlier in the day. Told patient that due to the risk of medication interactions and hospital policy that he can't ingest cannabis while in the hospital. Patient verbalized understanding of education. Linsey Wegener APRN informed.     0100:    During patient rounds, found patient smoking vape. Informed patient that I will have to take vape and put in nursing cell outside of his room and smoking is not allowed in the hospital. Patient verbalized understanding. Called security to talk with patient and re - establish expectations while in the hospital. Linsey Wegener APRN informed

## 2024-06-09 NOTE — CARE PLAN
The patient is Stable - Low risk of patient condition declining or worsening      Problem: Pain - Standard  Goal: Alleviation of pain or a reduction in pain to the patient’s comfort goal  Outcome: Progressing  Working with patient to manage pain prior to surgery. Patient verbalized understanding of interventions and education.   Interventions:  - medications   - repositioning   - ice packs   - distractions  - rest    Problem: Knowledge Deficit - Standard  Goal: Patient and family/care givers will demonstrate understanding of plan of care, disease process/condition, diagnostic tests and medications  Outcome: Progressing   Educated patient on smoking while in the hospital, patient verbalized understanding of education.     Shift Goals  Clinical Goals: pain control, safety, heparin drip  Patient Goals: pain control, comfort  Family Goals: na

## 2024-06-09 NOTE — PROGRESS NOTES
Trauma / Surgical Daily Progress Note    Date of Service  6/9/2024    Chief Complaint  39 y.o. male admitted 6/5/2024 with cervical neck injury after jumping off a 10 ft wall.   History of poly-substance abuse and above knee DVT.      Interval Events  Waiting for surgery on Wed the 12th  Was noted to be using cannibas chocolates and vape pen by nursing staff last night.    -Continue hep    Review of Systems  Review of Systems   Constitutional: Negative.    Eyes: Negative.  Negative for double vision.   Respiratory: Negative.     Cardiovascular: Negative.    Gastrointestinal: Negative.    Genitourinary: Negative.    Musculoskeletal:  Positive for myalgias and neck pain.   Neurological: Negative.  Negative for dizziness and focal weakness.   Psychiatric/Behavioral: Negative.     All other systems reviewed and are negative.       Vital Signs  Temp:  [36.7 °C (98.1 °F)-36.8 °C (98.2 °F)] 36.7 °C (98.1 °F)  Pulse:  [75-80] 75  Resp:  [14-18] 14  BP: (106-120)/(60-69) 120/69  SpO2:  [94 %-96 %] 96 %    Physical Exam  Physical Exam  Vitals and nursing note reviewed.   Constitutional:       General: He is awake.      Appearance: Normal appearance. He is well-developed.      Interventions: Cervical collar in place.   Pulmonary:      Effort: No respiratory distress.   Chest:      Chest wall: No deformity, swelling, tenderness or crepitus.   Genitourinary:     Comments: Voiding.   Musculoskeletal:         General: Tenderness present. Normal range of motion.      Cervical back: Neck supple. Spinous process tenderness present.      Comments: 5/5 upper and lower extremity strength bilaterally.    Skin:     General: Skin is warm and dry.      Capillary Refill: Capillary refill takes less than 2 seconds.      Findings: Abrasion present.      Comments: Superficial abrasions to bilateral anterior feet.    Neurological:      Mental Status: He is alert and oriented to person, place, and time. Mental status is at baseline.    Psychiatric:         Mood and Affect: Mood normal.         Behavior: Behavior is cooperative.         Laboratory  Recent Results (from the past 24 hour(s))   Heparin Xa (Unfractionated)    Collection Time: 06/08/24  4:43 PM   Result Value Ref Range    Heparin Xa (UFH) 0.49 IU/mL   Heparin Anti-Xa    Collection Time: 06/08/24 10:43 PM   Result Value Ref Range    Heparin Xa (UFH) 0.46 IU/mL   CBC with Differential: Tomorrow AM    Collection Time: 06/09/24 12:55 AM   Result Value Ref Range    WBC 4.9 4.8 - 10.8 K/uL    RBC 3.98 (L) 4.70 - 6.10 M/uL    Hemoglobin 12.9 (L) 14.0 - 18.0 g/dL    Hematocrit 38.0 (L) 42.0 - 52.0 %    MCV 95.5 81.4 - 97.8 fL    MCH 32.4 27.0 - 33.0 pg    MCHC 33.9 32.3 - 36.5 g/dL    RDW 46.1 35.9 - 50.0 fL    Platelet Count 223 164 - 446 K/uL    MPV 9.5 9.0 - 12.9 fL    Neutrophils-Polys 47.10 44.00 - 72.00 %    Lymphocytes 33.90 22.00 - 41.00 %    Monocytes 13.10 0.00 - 13.40 %    Eosinophils 3.90 0.00 - 6.90 %    Basophils 0.60 0.00 - 1.80 %    Immature Granulocytes 1.40 (H) 0.00 - 0.90 %    Nucleated RBC 0.00 0.00 - 0.20 /100 WBC    Neutrophils (Absolute) 2.31 1.82 - 7.42 K/uL    Lymphs (Absolute) 1.66 1.00 - 4.80 K/uL    Monos (Absolute) 0.64 0.00 - 0.85 K/uL    Eos (Absolute) 0.19 0.00 - 0.51 K/uL    Baso (Absolute) 0.03 0.00 - 0.12 K/uL    Immature Granulocytes (abs) 0.07 0.00 - 0.11 K/uL    NRBC (Absolute) 0.00 K/uL   Comp Metabolic Panel (CMP): Tomorrow AM    Collection Time: 06/09/24 12:55 AM   Result Value Ref Range    Sodium 137 135 - 145 mmol/L    Potassium 4.0 3.6 - 5.5 mmol/L    Chloride 103 96 - 112 mmol/L    Co2 23 20 - 33 mmol/L    Anion Gap 11.0 7.0 - 16.0    Glucose 115 (H) 65 - 99 mg/dL    Bun 11 8 - 22 mg/dL    Creatinine 0.70 0.50 - 1.40 mg/dL    Calcium 8.9 8.5 - 10.5 mg/dL    Correct Calcium 9.0 8.5 - 10.5 mg/dL    AST(SGOT) 19 12 - 45 U/L    ALT(SGPT) 18 2 - 50 U/L    Alkaline Phosphatase 79 30 - 99 U/L    Total Bilirubin 0.2 0.1 - 1.5 mg/dL    Albumin 3.9 3.2 - 4.9  g/dL    Total Protein 6.3 6.0 - 8.2 g/dL    Globulin 2.4 1.9 - 3.5 g/dL    A-G Ratio 1.6 g/dL   Phosphorus: Every Monday and Thursday AM    Collection Time: 06/09/24 12:55 AM   Result Value Ref Range    Phosphorus 3.4 2.5 - 4.5 mg/dL   ESTIMATED GFR    Collection Time: 06/09/24 12:55 AM   Result Value Ref Range    GFR (CKD-EPI) 120 >60 mL/min/1.73 m 2       Fluids    Intake/Output Summary (Last 24 hours) at 6/9/2024 1146  Last data filed at 6/9/2024 0339  Gross per 24 hour   Intake --   Output 650 ml   Net -650 ml       Core Measures & Quality Metrics  Core Measures & Quality Metrics  RAP Score Total: 4    CAGE Results: positive Blood Alcohol>0.08: no CAGE Score: 0  Total: POSITIVE  Assessment complete date: 6/5/2024  Intervention: Complete. Patient response to intervention: patient rreports he uses substances recreationally, declines intervention..   Patient demonstrates understanding of intervention. Patient does not agree to follow-up.   has not been contacted. Total ETOH intervention time: 15 - 30 mintues      Assessment/Plan  * Trauma- (present on admission)  Assessment & Plan  Jumped off an embankment ~ 10 ft height.  Trauma Green Activation.  Cosmo Ruiz MD. Trauma Surgery.     Acute deep vein thrombosis (DVT) of left lower extremity (HCC)- (present on admission)  Assessment & Plan  Subacute and chronic left lower extremity DVT noted on LE Duplex US (6/6).  Patient has a known history of DVT and has completed a 6mo course of Xarelto.   6/6 Heparin gtt initiated and okayed by neurosurgery.  Anti-Xa protocol.      Closed fracture of seventh cervical vertebra (HCC)- (present on admission)  Assessment & Plan  Left C7 superior facet fracture with jumped facet at C6/C7.  2.7 mm anterolisthesis C6 on C7  CTA within normal limits.  Definitive operative reduction and stabilization pending. 6/12  Cervical immobilization. Ok to mobilize with collar securely on.    Mitch Leon MD, PhD.  Neurosurgeon. [facility].    Tobacco use- (present on admission)  Assessment & Plan  6/9 Nicotine replacement.   Cessation education.    Fracture three ribs-closed, right, initial encounter- (present on admission)  Assessment & Plan  subtle anterior right fourth through sixth rib fractures.  Aggressive multimodal pain management, and pulmonary hygiene. Serial chest radiographs.    No contraindication to deep vein thrombosis (DVT) prophylaxis- (present on admission)  Assessment & Plan  VTE prophylaxis initially contraindicated secondary to elevated bleeding risk.  6/6 Left lower extremity DVT identified. Heparin gtt initiated.    Laceration of head- (present on admission)  Assessment & Plan  Stapled in ED.  Routine remove in 7 days         Discussed patient condition with RN and trauma surgery Joseph Hernández.

## 2024-06-10 ENCOUNTER — APPOINTMENT (OUTPATIENT)
Dept: RADIOLOGY | Facility: MEDICAL CENTER | Age: 39
DRG: 519 | End: 2024-06-10
Attending: REGISTERED NURSE
Payer: COMMERCIAL

## 2024-06-10 LAB
ALBUMIN SERPL BCP-MCNC: 4.1 G/DL (ref 3.2–4.9)
ALBUMIN/GLOB SERPL: 1.6 G/DL
ALP SERPL-CCNC: 129 U/L (ref 30–99)
ALT SERPL-CCNC: 177 U/L (ref 2–50)
ANION GAP SERPL CALC-SCNC: 13 MMOL/L (ref 7–16)
AST SERPL-CCNC: 200 U/L (ref 12–45)
BASOPHILS # BLD AUTO: 0.4 % (ref 0–1.8)
BASOPHILS # BLD: 0.02 K/UL (ref 0–0.12)
BILIRUB SERPL-MCNC: 0.2 MG/DL (ref 0.1–1.5)
BUN SERPL-MCNC: 10 MG/DL (ref 8–22)
CALCIUM ALBUM COR SERPL-MCNC: 8.8 MG/DL (ref 8.5–10.5)
CALCIUM SERPL-MCNC: 8.9 MG/DL (ref 8.5–10.5)
CHLORIDE SERPL-SCNC: 101 MMOL/L (ref 96–112)
CO2 SERPL-SCNC: 22 MMOL/L (ref 20–33)
CREAT SERPL-MCNC: 0.72 MG/DL (ref 0.5–1.4)
EKG IMPRESSION: NORMAL
EOSINOPHIL # BLD AUTO: 0.2 K/UL (ref 0–0.51)
EOSINOPHIL NFR BLD: 3.6 % (ref 0–6.9)
ERYTHROCYTE [DISTWIDTH] IN BLOOD BY AUTOMATED COUNT: 47 FL (ref 35.9–50)
GFR SERPLBLD CREATININE-BSD FMLA CKD-EPI: 119 ML/MIN/1.73 M 2
GLOBULIN SER CALC-MCNC: 2.6 G/DL (ref 1.9–3.5)
GLUCOSE SERPL-MCNC: 106 MG/DL (ref 65–99)
HCT VFR BLD AUTO: 39.4 % (ref 42–52)
HGB BLD-MCNC: 13.5 G/DL (ref 14–18)
IMM GRANULOCYTES # BLD AUTO: 0.09 K/UL (ref 0–0.11)
IMM GRANULOCYTES NFR BLD AUTO: 1.6 % (ref 0–0.9)
LYMPHOCYTES # BLD AUTO: 1.62 K/UL (ref 1–4.8)
LYMPHOCYTES NFR BLD: 29.6 % (ref 22–41)
MAGNESIUM SERPL-MCNC: 2.2 MG/DL (ref 1.5–2.5)
MCH RBC QN AUTO: 32.9 PG (ref 27–33)
MCHC RBC AUTO-ENTMCNC: 34.3 G/DL (ref 32.3–36.5)
MCV RBC AUTO: 96.1 FL (ref 81.4–97.8)
MONOCYTES # BLD AUTO: 0.66 K/UL (ref 0–0.85)
MONOCYTES NFR BLD AUTO: 12 % (ref 0–13.4)
NEUTROPHILS # BLD AUTO: 2.89 K/UL (ref 1.82–7.42)
NEUTROPHILS NFR BLD: 52.8 % (ref 44–72)
NRBC # BLD AUTO: 0 K/UL
NRBC BLD-RTO: 0 /100 WBC (ref 0–0.2)
PHOSPHATE SERPL-MCNC: 3.5 MG/DL (ref 2.5–4.5)
PLATELET # BLD AUTO: 248 K/UL (ref 164–446)
PMV BLD AUTO: 9.5 FL (ref 9–12.9)
POTASSIUM SERPL-SCNC: 4.3 MMOL/L (ref 3.6–5.5)
PROT SERPL-MCNC: 6.7 G/DL (ref 6–8.2)
RBC # BLD AUTO: 4.1 M/UL (ref 4.7–6.1)
SODIUM SERPL-SCNC: 136 MMOL/L (ref 135–145)
TROPONIN T SERPL-MCNC: <6 NG/L (ref 6–19)
UFH PPP CHRO-ACNC: 0.29 IU/ML
WBC # BLD AUTO: 5.5 K/UL (ref 4.8–10.8)

## 2024-06-10 PROCEDURE — 770001 HCHG ROOM/CARE - MED/SURG/GYN PRIV*

## 2024-06-10 PROCEDURE — 700111 HCHG RX REV CODE 636 W/ 250 OVERRIDE (IP): Performed by: REGISTERED NURSE

## 2024-06-10 PROCEDURE — 700111 HCHG RX REV CODE 636 W/ 250 OVERRIDE (IP)

## 2024-06-10 PROCEDURE — 36415 COLL VENOUS BLD VENIPUNCTURE: CPT

## 2024-06-10 PROCEDURE — 84100 ASSAY OF PHOSPHORUS: CPT

## 2024-06-10 PROCEDURE — 84484 ASSAY OF TROPONIN QUANT: CPT

## 2024-06-10 PROCEDURE — 80053 COMPREHEN METABOLIC PANEL: CPT

## 2024-06-10 PROCEDURE — 700102 HCHG RX REV CODE 250 W/ 637 OVERRIDE(OP)

## 2024-06-10 PROCEDURE — 99233 SBSQ HOSP IP/OBS HIGH 50: CPT | Performed by: NURSE PRACTITIONER

## 2024-06-10 PROCEDURE — A9270 NON-COVERED ITEM OR SERVICE: HCPCS

## 2024-06-10 PROCEDURE — 93010 ELECTROCARDIOGRAM REPORT: CPT | Performed by: INTERNAL MEDICINE

## 2024-06-10 PROCEDURE — 700102 HCHG RX REV CODE 250 W/ 637 OVERRIDE(OP): Performed by: NURSE PRACTITIONER

## 2024-06-10 PROCEDURE — 83735 ASSAY OF MAGNESIUM: CPT

## 2024-06-10 PROCEDURE — 71045 X-RAY EXAM CHEST 1 VIEW: CPT

## 2024-06-10 PROCEDURE — A9270 NON-COVERED ITEM OR SERVICE: HCPCS | Performed by: NURSE PRACTITIONER

## 2024-06-10 PROCEDURE — A9270 NON-COVERED ITEM OR SERVICE: HCPCS | Performed by: REGISTERED NURSE

## 2024-06-10 PROCEDURE — 700102 HCHG RX REV CODE 250 W/ 637 OVERRIDE(OP): Performed by: REGISTERED NURSE

## 2024-06-10 PROCEDURE — 93005 ELECTROCARDIOGRAM TRACING: CPT | Performed by: NURSE PRACTITIONER

## 2024-06-10 PROCEDURE — 85025 COMPLETE CBC W/AUTO DIFF WBC: CPT

## 2024-06-10 PROCEDURE — 85520 HEPARIN ASSAY: CPT

## 2024-06-10 RX ADMIN — GABAPENTIN 300 MG: 300 CAPSULE ORAL at 04:40

## 2024-06-10 RX ADMIN — METAXALONE 800 MG: 800 TABLET ORAL at 16:49

## 2024-06-10 RX ADMIN — GABAPENTIN 300 MG: 300 CAPSULE ORAL at 14:32

## 2024-06-10 RX ADMIN — HEPARIN SODIUM 16 UNITS/KG/HR: 5000 INJECTION, SOLUTION INTRAVENOUS at 12:02

## 2024-06-10 RX ADMIN — OXYCODONE HYDROCHLORIDE 10 MG: 10 TABLET ORAL at 12:03

## 2024-06-10 RX ADMIN — OXYCODONE HYDROCHLORIDE 10 MG: 10 TABLET ORAL at 20:31

## 2024-06-10 RX ADMIN — DOCUSATE SODIUM 100 MG: 100 CAPSULE, LIQUID FILLED ORAL at 04:40

## 2024-06-10 RX ADMIN — ACETAMINOPHEN 1000 MG: 500 TABLET, FILM COATED ORAL at 20:31

## 2024-06-10 RX ADMIN — POLYETHYLENE GLYCOL 3350 1 PACKET: 17 POWDER, FOR SOLUTION ORAL at 04:40

## 2024-06-10 RX ADMIN — OXYCODONE HYDROCHLORIDE 10 MG: 10 TABLET ORAL at 04:39

## 2024-06-10 RX ADMIN — METAXALONE 800 MG: 800 TABLET ORAL at 04:40

## 2024-06-10 RX ADMIN — HYDROMORPHONE HYDROCHLORIDE 0.5 MG: 1 INJECTION, SOLUTION INTRAMUSCULAR; INTRAVENOUS; SUBCUTANEOUS at 21:48

## 2024-06-10 RX ADMIN — GABAPENTIN 300 MG: 300 CAPSULE ORAL at 21:50

## 2024-06-10 RX ADMIN — OXYCODONE HYDROCHLORIDE 10 MG: 10 TABLET ORAL at 15:24

## 2024-06-10 RX ADMIN — HYDROMORPHONE HYDROCHLORIDE 0.5 MG: 1 INJECTION, SOLUTION INTRAMUSCULAR; INTRAVENOUS; SUBCUTANEOUS at 01:21

## 2024-06-10 RX ADMIN — SERTRALINE HYDROCHLORIDE 25 MG: 50 TABLET ORAL at 04:39

## 2024-06-10 RX ADMIN — NICOTINE 14 MG: 14 PATCH TRANSDERMAL at 04:40

## 2024-06-10 RX ADMIN — NICOTINE POLACRILEX 2 MG: 2 GUM, CHEWING BUCCAL at 15:25

## 2024-06-10 RX ADMIN — HYDROMORPHONE HYDROCHLORIDE 0.5 MG: 1 INJECTION, SOLUTION INTRAMUSCULAR; INTRAVENOUS; SUBCUTANEOUS at 06:31

## 2024-06-10 RX ADMIN — HYDROMORPHONE HYDROCHLORIDE 0.5 MG: 1 INJECTION, SOLUTION INTRAMUSCULAR; INTRAVENOUS; SUBCUTANEOUS at 16:49

## 2024-06-10 RX ADMIN — BACITRACIN ZINC: 500 OINTMENT TOPICAL at 04:40

## 2024-06-10 RX ADMIN — METAXALONE 800 MG: 800 TABLET ORAL at 12:02

## 2024-06-10 RX ADMIN — OXYCODONE HYDROCHLORIDE 10 MG: 10 TABLET ORAL at 08:22

## 2024-06-10 ASSESSMENT — PAIN DESCRIPTION - PAIN TYPE
TYPE: ACUTE PAIN

## 2024-06-10 ASSESSMENT — ENCOUNTER SYMPTOMS
CARDIOVASCULAR NEGATIVE: 1
MYALGIAS: 1
PSYCHIATRIC NEGATIVE: 1
RESPIRATORY NEGATIVE: 1
DIZZINESS: 0
NEUROLOGICAL NEGATIVE: 1
GASTROINTESTINAL NEGATIVE: 1
NECK PAIN: 1
DOUBLE VISION: 0
FOCAL WEAKNESS: 0
CONSTITUTIONAL NEGATIVE: 1
EYES NEGATIVE: 1

## 2024-06-10 NOTE — DISCHARGE PLANNING
Case Management Discharge Planning    Admission Date: 6/5/2024  GMLOS: 2.9  ALOS: 5    6-Clicks ADL Score: 18  6-Clicks Mobility Score: 18      Anticipated Discharge Dispo: Discharge Disposition: D/T to home under [facility] care in anticipation of covered skilled care (06)  Discharge Address: [address] [address] [address]  Discharge Contact Phone Number: [phone]    DME Needed: No    Action(s) Taken: Pt discussed in IDT rounds.  Plan is for neurosurgery 6/12/24.  Per PT/OT/SLP no needs anticipated at this time.      Escalations Completed: None    Medically Clear: No    Next Steps: RN CM to continue to follow for DC planning      Barriers to Discharge: Medical clearance    Is the patient up for discharge tomorrow: No

## 2024-06-10 NOTE — CARE PLAN
The patient is Stable - Low risk of patient condition declining or worsening      Problem: Pain - Standard  Goal: Alleviation of pain or a reduction in pain to the patient’s comfort goal  6/10/2024 0402 by Jessica Tejeda R.N.  Outcome: Progressing  Working with patient to manage pain prior to surgery. Patient verbalized understanding of interventions and education to decrease pain.  Interventions:  - medications, rest, repositioning, ice packs, distraction  Shift Goals  Clinical Goals: pain control, safety  Patient Goals: pain control, rest  Family Goals: na

## 2024-06-10 NOTE — CARE PLAN
The patient is Stable - Low risk of patient condition declining or worsening    Shift Goals  Clinical Goals: pain control, safety  Patient Goals: pain control, rest  Family Goals: not present    Progress made toward(s) clinical / shift goals:    Problem: Pain - Standard  Goal: Alleviation of pain or a reduction in pain to the patient’s comfort goal  Outcome: Progressing     Pt experiencing pain related to abrasions on bilateral ventral feet and cervical neck. Educated to use 0-10 pain scale to determine medication intervention used.     Problem: Knowledge Deficit - Standard  Goal: Patient and family/care givers will demonstrate understanding of plan of care, disease process/condition, diagnostic tests and medications  Outcome: Progressing     Problem: Skin Integrity  Goal: Skin integrity is maintained or improved  Outcome: Progressing       Pt encouraged and educated to move and turn in bed when able to, and mobilize to the bathroom to prevention pressure injury formation.    Problem: Fall Risk  Goal: Patient will remain free from falls  Outcome: Progressing     Pt educated to call for assistance when needing to go to the bathroom and mobilize, treaded socks offered to pt, who refused. Education on importance on treaded socks provided.    Problem: Neuro Status  Goal: Neuro status will remain stable or improve  Outcome: Progressing     Q4h neuro checks inititiated    Patient is not progressing towards the following goals:

## 2024-06-10 NOTE — PROGRESS NOTES
Trauma / Surgical Daily Progress Note    Date of Service  6/10/2024    Chief Complaint  39 y.o. male admitted 6/5/2024 with cervical neck injury after jumping off a 10 ft wall.   History of poly-substance abuse and above knee DVT.     Interval Events    No critical events overnight.  No neurological deficits.   Heparin drip for subacute/chronic left lower extremity.     - Tentatively to OR 5/12 for repair of cervical spine injury.     Review of Systems  Review of Systems   Constitutional: Negative.    Eyes: Negative.  Negative for double vision.   Respiratory: Negative.     Cardiovascular: Negative.    Gastrointestinal: Negative.    Genitourinary: Negative.    Musculoskeletal:  Positive for myalgias and neck pain.   Neurological: Negative.  Negative for dizziness and focal weakness.   Psychiatric/Behavioral: Negative.     All other systems reviewed and are negative.       Vital Signs  Temp:  [36.3 °C (97.3 °F)-36.8 °C (98.2 °F)] 36.3 °C (97.3 °F)  Pulse:  [67-81] 76  Resp:  [16-18] 16  BP: (108-113)/(64-75) 113/67  SpO2:  [90 %-96 %] 90 %    Physical Exam  Physical Exam  Vitals and nursing note reviewed.   Constitutional:       General: He is awake.      Appearance: Normal appearance. He is well-developed.      Interventions: Cervical collar in place.   Pulmonary:      Effort: No respiratory distress.   Chest:      Chest wall: No deformity, swelling, tenderness or crepitus.   Genitourinary:     Comments: Voiding.   Musculoskeletal:         General: Tenderness present. Normal range of motion.      Cervical back: Neck supple. Spinous process tenderness present.      Comments: 5/5 upper and lower extremity strength bilaterally.    Skin:     General: Skin is warm and dry.      Capillary Refill: Capillary refill takes less than 2 seconds.      Findings: Abrasion present.      Comments: Superficial abrasions to bilateral anterior feet.    Neurological:      Mental Status: He is alert and oriented to person, place, and  time. Mental status is at baseline.   Psychiatric:         Mood and Affect: Mood normal.         Behavior: Behavior is cooperative.         Laboratory  Recent Results (from the past 24 hour(s))   Heparin Anti-Xa    Collection Time: 06/09/24 10:41 PM   Result Value Ref Range    Heparin Xa (UFH) 0.33 IU/mL   CBC with Differential: Tomorrow AM    Collection Time: 06/10/24  1:02 AM   Result Value Ref Range    WBC 5.5 4.8 - 10.8 K/uL    RBC 4.10 (L) 4.70 - 6.10 M/uL    Hemoglobin 13.5 (L) 14.0 - 18.0 g/dL    Hematocrit 39.4 (L) 42.0 - 52.0 %    MCV 96.1 81.4 - 97.8 fL    MCH 32.9 27.0 - 33.0 pg    MCHC 34.3 32.3 - 36.5 g/dL    RDW 47.0 35.9 - 50.0 fL    Platelet Count 248 164 - 446 K/uL    MPV 9.5 9.0 - 12.9 fL    Neutrophils-Polys 52.80 44.00 - 72.00 %    Lymphocytes 29.60 22.00 - 41.00 %    Monocytes 12.00 0.00 - 13.40 %    Eosinophils 3.60 0.00 - 6.90 %    Basophils 0.40 0.00 - 1.80 %    Immature Granulocytes 1.60 (H) 0.00 - 0.90 %    Nucleated RBC 0.00 0.00 - 0.20 /100 WBC    Neutrophils (Absolute) 2.89 1.82 - 7.42 K/uL    Lymphs (Absolute) 1.62 1.00 - 4.80 K/uL    Monos (Absolute) 0.66 0.00 - 0.85 K/uL    Eos (Absolute) 0.20 0.00 - 0.51 K/uL    Baso (Absolute) 0.02 0.00 - 0.12 K/uL    Immature Granulocytes (abs) 0.09 0.00 - 0.11 K/uL    NRBC (Absolute) 0.00 K/uL   Comp Metabolic Panel (CMP): Tomorrow AM    Collection Time: 06/10/24  1:02 AM   Result Value Ref Range    Sodium 136 135 - 145 mmol/L    Potassium 4.3 3.6 - 5.5 mmol/L    Chloride 101 96 - 112 mmol/L    Co2 22 20 - 33 mmol/L    Anion Gap 13.0 7.0 - 16.0    Glucose 106 (H) 65 - 99 mg/dL    Bun 10 8 - 22 mg/dL    Creatinine 0.72 0.50 - 1.40 mg/dL    Calcium 8.9 8.5 - 10.5 mg/dL    Correct Calcium 8.8 8.5 - 10.5 mg/dL    AST(SGOT) 200 (H) 12 - 45 U/L    ALT(SGPT) 177 (H) 2 - 50 U/L    Alkaline Phosphatase 129 (H) 30 - 99 U/L    Total Bilirubin 0.2 0.1 - 1.5 mg/dL    Albumin 4.1 3.2 - 4.9 g/dL    Total Protein 6.7 6.0 - 8.2 g/dL    Globulin 2.6 1.9 - 3.5 g/dL     A-G Ratio 1.6 g/dL   Phosphorus: Every Monday and Thursday AM    Collection Time: 06/10/24  1:02 AM   Result Value Ref Range    Phosphorus 3.5 2.5 - 4.5 mg/dL   MAGNESIUM    Collection Time: 06/10/24  1:02 AM   Result Value Ref Range    Magnesium 2.2 1.5 - 2.5 mg/dL   ESTIMATED GFR    Collection Time: 06/10/24  1:02 AM   Result Value Ref Range    GFR (CKD-EPI) 119 >60 mL/min/1.73 m 2       Fluids    Intake/Output Summary (Last 24 hours) at 6/10/2024 1042  Last data filed at 6/10/2024 0412  Gross per 24 hour   Intake 200 ml   Output 100 ml   Net 100 ml       Core Measures & Quality Metrics  Radiology images reviewed, Labs reviewed and Medications reviewed  Kaiser catheter: No Kaiser      DVT: Heparin drip.  DVT prophylaxis - mechanical: SCDs  Ulcer prophylaxis: No    Assessed for rehab: Patient was assess for and/or received rehabilitation services during this hospitalization    RAP Score Total: 4    CAGE Results: positive Blood Alcohol>0.08: no CAGE Score: 0  Total: POSITIVE  Assessment complete date: 6/5/2024  Intervention: Complete. Patient response to intervention: patient rreports he uses substances recreationally, declines intervention..   Patient demonstrates understanding of intervention. Patient does not agree to follow-up.   has not been contacted. Total ETOH intervention time: 15 - 30 mintues      Assessment/Plan  * Trauma- (present on admission)  Assessment & Plan  Jumped off an embankment ~ 10 ft height.  Trauma Green Activation.  Cosmo Ruiz MD. Trauma Surgery.     Acute deep vein thrombosis (DVT) of left lower extremity (HCC)- (present on admission)  Assessment & Plan  Subacute and chronic left lower extremity DVT noted on LE Duplex US (6/6).  Patient has a known history of DVT and has completed a 6mo course of Xarelto.   6/6 Heparin gtt initiated and okayed by neurosurgery.  Anti-Xa protocol.      Closed fracture of seventh cervical vertebra (HCC)- (present on  admission)  Assessment & Plan  Left C7 superior facet fracture with jumped facet at C6/C7.  2.7 mm anterolisthesis C6 on C7  CTA within normal limits.  Definitive operative reduction and stabilization pending. 6/12  Cervical immobilization. Ok to mobilize with collar securely on.    Mitch Leon MD, PhD. Neurosurgeon. [facility].    Tobacco use- (present on admission)  Assessment & Plan  6/9 Nicotine replacement.   Cessation education.    Fracture three ribs-closed, right, initial encounter- (present on admission)  Assessment & Plan  subtle anterior right fourth through sixth rib fractures.  Aggressive multimodal pain management, and pulmonary hygiene. Serial chest radiographs.    No contraindication to deep vein thrombosis (DVT) prophylaxis- (present on admission)  Assessment & Plan  VTE prophylaxis initially contraindicated secondary to elevated bleeding risk.  6/6 Left lower extremity DVT identified. Heparin gtt initiated.    Laceration of head- (present on admission)  Assessment & Plan  Stapled in ED.  Routine remove in 7 days       Discussed patient condition with Patient and trauma surgery Dr. Cosmo Ruiz.

## 2024-06-11 ENCOUNTER — APPOINTMENT (OUTPATIENT)
Dept: RADIOLOGY | Facility: MEDICAL CENTER | Age: 39
DRG: 519 | End: 2024-06-11
Attending: REGISTERED NURSE
Payer: COMMERCIAL

## 2024-06-11 ENCOUNTER — APPOINTMENT (OUTPATIENT)
Dept: RADIOLOGY | Facility: MEDICAL CENTER | Age: 39
DRG: 519 | End: 2024-06-11
Attending: NURSE PRACTITIONER
Payer: COMMERCIAL

## 2024-06-11 LAB
ALBUMIN SERPL BCP-MCNC: 4.3 G/DL (ref 3.2–4.9)
ALBUMIN/GLOB SERPL: 1.3 G/DL
ALP SERPL-CCNC: 144 U/L (ref 30–99)
ALT SERPL-CCNC: 189 U/L (ref 2–50)
ANION GAP SERPL CALC-SCNC: 14 MMOL/L (ref 7–16)
AST SERPL-CCNC: 100 U/L (ref 12–45)
BASOPHILS # BLD AUTO: 0.3 % (ref 0–1.8)
BASOPHILS # BLD: 0.03 K/UL (ref 0–0.12)
BILIRUB SERPL-MCNC: 0.3 MG/DL (ref 0.1–1.5)
BUN SERPL-MCNC: 11 MG/DL (ref 8–22)
CALCIUM ALBUM COR SERPL-MCNC: 8.5 MG/DL (ref 8.5–10.5)
CALCIUM SERPL-MCNC: 8.7 MG/DL (ref 8.5–10.5)
CHLORIDE SERPL-SCNC: 95 MMOL/L (ref 96–112)
CO2 SERPL-SCNC: 23 MMOL/L (ref 20–33)
CREAT SERPL-MCNC: 0.75 MG/DL (ref 0.5–1.4)
EOSINOPHIL # BLD AUTO: 0.11 K/UL (ref 0–0.51)
EOSINOPHIL NFR BLD: 1.1 % (ref 0–6.9)
ERYTHROCYTE [DISTWIDTH] IN BLOOD BY AUTOMATED COUNT: 46.7 FL (ref 35.9–50)
GFR SERPLBLD CREATININE-BSD FMLA CKD-EPI: 117 ML/MIN/1.73 M 2
GLOBULIN SER CALC-MCNC: 3.4 G/DL (ref 1.9–3.5)
GLUCOSE SERPL-MCNC: 114 MG/DL (ref 65–99)
HCT VFR BLD AUTO: 43.2 % (ref 42–52)
HGB BLD-MCNC: 14.8 G/DL (ref 14–18)
IMM GRANULOCYTES # BLD AUTO: 0.07 K/UL (ref 0–0.11)
IMM GRANULOCYTES NFR BLD AUTO: 0.7 % (ref 0–0.9)
LYMPHOCYTES # BLD AUTO: 1.42 K/UL (ref 1–4.8)
LYMPHOCYTES NFR BLD: 13.8 % (ref 22–41)
MCH RBC QN AUTO: 32.1 PG (ref 27–33)
MCHC RBC AUTO-ENTMCNC: 34.3 G/DL (ref 32.3–36.5)
MCV RBC AUTO: 93.7 FL (ref 81.4–97.8)
MONOCYTES # BLD AUTO: 1.28 K/UL (ref 0–0.85)
MONOCYTES NFR BLD AUTO: 12.4 % (ref 0–13.4)
NEUTROPHILS # BLD AUTO: 7.41 K/UL (ref 1.82–7.42)
NEUTROPHILS NFR BLD: 71.7 % (ref 44–72)
NRBC # BLD AUTO: 0 K/UL
NRBC BLD-RTO: 0 /100 WBC (ref 0–0.2)
PHOSPHATE SERPL-MCNC: 3.4 MG/DL (ref 2.5–4.5)
PLATELET # BLD AUTO: 277 K/UL (ref 164–446)
PMV BLD AUTO: 9.3 FL (ref 9–12.9)
POTASSIUM SERPL-SCNC: 4.4 MMOL/L (ref 3.6–5.5)
PROT SERPL-MCNC: 7.7 G/DL (ref 6–8.2)
RBC # BLD AUTO: 4.61 M/UL (ref 4.7–6.1)
S PYO DNA SPEC NAA+PROBE: NOT DETECTED
SODIUM SERPL-SCNC: 132 MMOL/L (ref 135–145)
UFH PPP CHRO-ACNC: 0.46 IU/ML
UFH PPP CHRO-ACNC: 0.47 IU/ML
WBC # BLD AUTO: 10.3 K/UL (ref 4.8–10.8)

## 2024-06-11 PROCEDURE — 36415 COLL VENOUS BLD VENIPUNCTURE: CPT

## 2024-06-11 PROCEDURE — A9270 NON-COVERED ITEM OR SERVICE: HCPCS | Performed by: NURSE PRACTITIONER

## 2024-06-11 PROCEDURE — 99233 SBSQ HOSP IP/OBS HIGH 50: CPT | Performed by: NURSE PRACTITIONER

## 2024-06-11 PROCEDURE — 80053 COMPREHEN METABOLIC PANEL: CPT

## 2024-06-11 PROCEDURE — A9270 NON-COVERED ITEM OR SERVICE: HCPCS | Performed by: REGISTERED NURSE

## 2024-06-11 PROCEDURE — 71045 X-RAY EXAM CHEST 1 VIEW: CPT

## 2024-06-11 PROCEDURE — 700111 HCHG RX REV CODE 636 W/ 250 OVERRIDE (IP): Performed by: REGISTERED NURSE

## 2024-06-11 PROCEDURE — 85520 HEPARIN ASSAY: CPT

## 2024-06-11 PROCEDURE — 87651 STREP A DNA AMP PROBE: CPT

## 2024-06-11 PROCEDURE — 70490 CT SOFT TISSUE NECK W/O DYE: CPT

## 2024-06-11 PROCEDURE — 700111 HCHG RX REV CODE 636 W/ 250 OVERRIDE (IP)

## 2024-06-11 PROCEDURE — 700102 HCHG RX REV CODE 250 W/ 637 OVERRIDE(OP)

## 2024-06-11 PROCEDURE — 84100 ASSAY OF PHOSPHORUS: CPT

## 2024-06-11 PROCEDURE — 700111 HCHG RX REV CODE 636 W/ 250 OVERRIDE (IP): Performed by: NURSE PRACTITIONER

## 2024-06-11 PROCEDURE — 770001 HCHG ROOM/CARE - MED/SURG/GYN PRIV*

## 2024-06-11 PROCEDURE — 700102 HCHG RX REV CODE 250 W/ 637 OVERRIDE(OP): Performed by: NURSE PRACTITIONER

## 2024-06-11 PROCEDURE — 85025 COMPLETE CBC W/AUTO DIFF WBC: CPT

## 2024-06-11 PROCEDURE — A9270 NON-COVERED ITEM OR SERVICE: HCPCS

## 2024-06-11 PROCEDURE — 700102 HCHG RX REV CODE 250 W/ 637 OVERRIDE(OP): Performed by: REGISTERED NURSE

## 2024-06-11 RX ORDER — OXYCODONE HYDROCHLORIDE 10 MG/1
10 TABLET ORAL
Status: DISCONTINUED | OUTPATIENT
Start: 2024-06-11 | End: 2024-06-12

## 2024-06-11 RX ORDER — OXYCODONE HYDROCHLORIDE 5 MG/1
5 TABLET ORAL
Status: DISCONTINUED | OUTPATIENT
Start: 2024-06-11 | End: 2024-06-12

## 2024-06-11 RX ORDER — HYDROMORPHONE HYDROCHLORIDE 1 MG/ML
0.5 INJECTION, SOLUTION INTRAMUSCULAR; INTRAVENOUS; SUBCUTANEOUS EVERY 6 HOURS PRN
Status: DISCONTINUED | OUTPATIENT
Start: 2024-06-11 | End: 2024-06-12

## 2024-06-11 RX ADMIN — TRAZODONE HYDROCHLORIDE 50 MG: 50 TABLET ORAL at 00:25

## 2024-06-11 RX ADMIN — OXYCODONE HYDROCHLORIDE 10 MG: 10 TABLET ORAL at 10:18

## 2024-06-11 RX ADMIN — METAXALONE 800 MG: 800 TABLET ORAL at 05:58

## 2024-06-11 RX ADMIN — HEPARIN SODIUM 3400 UNITS: 1000 INJECTION, SOLUTION INTRAVENOUS; SUBCUTANEOUS at 00:26

## 2024-06-11 RX ADMIN — SENNOSIDES AND DOCUSATE SODIUM 1 TABLET: 50; 8.6 TABLET ORAL at 21:33

## 2024-06-11 RX ADMIN — HYDROMORPHONE HYDROCHLORIDE 0.5 MG: 1 INJECTION, SOLUTION INTRAMUSCULAR; INTRAVENOUS; SUBCUTANEOUS at 05:54

## 2024-06-11 RX ADMIN — HYDROMORPHONE HYDROCHLORIDE 0.5 MG: 1 INJECTION, SOLUTION INTRAMUSCULAR; INTRAVENOUS; SUBCUTANEOUS at 19:48

## 2024-06-11 RX ADMIN — DOCUSATE SODIUM 100 MG: 100 CAPSULE, LIQUID FILLED ORAL at 17:00

## 2024-06-11 RX ADMIN — SERTRALINE HYDROCHLORIDE 25 MG: 50 TABLET ORAL at 05:54

## 2024-06-11 RX ADMIN — OXYCODONE HYDROCHLORIDE 10 MG: 10 TABLET ORAL at 04:04

## 2024-06-11 RX ADMIN — OXYCODONE HYDROCHLORIDE 10 MG: 10 TABLET ORAL at 00:25

## 2024-06-11 RX ADMIN — HEPARIN SODIUM 18 UNITS/KG/HR: 5000 INJECTION, SOLUTION INTRAVENOUS at 22:56

## 2024-06-11 RX ADMIN — OXYCODONE HYDROCHLORIDE 10 MG: 10 TABLET ORAL at 17:00

## 2024-06-11 RX ADMIN — DOCUSATE SODIUM 100 MG: 100 CAPSULE, LIQUID FILLED ORAL at 05:54

## 2024-06-11 RX ADMIN — OXYCODONE HYDROCHLORIDE 10 MG: 10 TABLET ORAL at 13:34

## 2024-06-11 RX ADMIN — NICOTINE 14 MG: 14 PATCH TRANSDERMAL at 05:59

## 2024-06-11 RX ADMIN — GABAPENTIN 300 MG: 300 CAPSULE ORAL at 05:54

## 2024-06-11 RX ADMIN — GABAPENTIN 300 MG: 300 CAPSULE ORAL at 13:33

## 2024-06-11 RX ADMIN — OXYCODONE HYDROCHLORIDE 10 MG: 10 TABLET ORAL at 21:33

## 2024-06-11 RX ADMIN — METAXALONE 800 MG: 800 TABLET ORAL at 17:00

## 2024-06-11 RX ADMIN — POLYETHYLENE GLYCOL 3350 1 PACKET: 17 POWDER, FOR SOLUTION ORAL at 17:00

## 2024-06-11 RX ADMIN — HEPARIN SODIUM 18 UNITS/KG/HR: 5000 INJECTION, SOLUTION INTRAVENOUS at 06:06

## 2024-06-11 RX ADMIN — BACITRACIN ZINC: 500 OINTMENT TOPICAL at 06:01

## 2024-06-11 RX ADMIN — GABAPENTIN 300 MG: 300 CAPSULE ORAL at 21:34

## 2024-06-11 ASSESSMENT — ENCOUNTER SYMPTOMS
CONSTITUTIONAL NEGATIVE: 1
CARDIOVASCULAR NEGATIVE: 1
MYALGIAS: 1
GASTROINTESTINAL NEGATIVE: 1
NECK PAIN: 1
EYES NEGATIVE: 1
PSYCHIATRIC NEGATIVE: 1
FOCAL WEAKNESS: 0
DIZZINESS: 0
RESPIRATORY NEGATIVE: 1
NEUROLOGICAL NEGATIVE: 1
DOUBLE VISION: 0

## 2024-06-11 ASSESSMENT — PAIN DESCRIPTION - PAIN TYPE
TYPE: ACUTE PAIN

## 2024-06-11 ASSESSMENT — PATIENT HEALTH QUESTIONNAIRE - PHQ9
2. FEELING DOWN, DEPRESSED, IRRITABLE, OR HOPELESS: NOT AT ALL
SUM OF ALL RESPONSES TO PHQ9 QUESTIONS 1 AND 2: 0
1. LITTLE INTEREST OR PLEASURE IN DOING THINGS: NOT AT ALL

## 2024-06-11 NOTE — PROGRESS NOTES
Neurosurgery Progress Note    Subjective:  No acute events overnight, l  eft index finger tip top of foot pain improved  Feels like difficulty to swallow this morning    Exam:  AAO, collar on, cooperative,   trace left tricep weakness  Sensation intact touch 4 extremities    BP  Min: 103/66  Max: 127/75  Pulse  Av.3  Min: 79  Max: 93  Resp  Av.3  Min: 16  Max: 20  Temp  Av.6 °C (97.8 °F)  Min: 36.1 °C (97 °F)  Max: 36.9 °C (98.4 °F)  SpO2  Av.8 %  Min: 92 %  Max: 94 %    No data recorded    Recent Labs     06/09/24  0055 06/10/24  01024  0730   WBC 4.9 5.5 10.3   RBC 3.98* 4.10* 4.61*   HEMOGLOBIN 12.9* 13.5* 14.8   HEMATOCRIT 38.0* 39.4* 43.2   MCV 95.5 96.1 93.7   MCH 32.4 32.9 32.1   MCHC 33.9 34.3 34.3   RDW 46.1 47.0 46.7   PLATELETCT 223 248 277   MPV 9.5 9.5 9.3     Recent Labs     06/09/24  0055 06/10/24  0102 06/11/24  0730   SODIUM 137 136 132*   POTASSIUM 4.0 4.3 4.4   CHLORIDE 103 101 95*   CO2 23 22 23   GLUCOSE 115* 106* 114*   BUN 11 10 11   CREATININE 0.70 0.72 0.75   CALCIUM 8.9 8.9 8.7                 Intake/Output                         06/10/24 0700 - 2459 24 - 2459      Total 190059 Total                 Intake    P.O.  --  -- --  240  -- 240    P.O. -- -- -- 240 -- 240    Total Intake -- -- -- 240 -- 240       Output    Urine  --  1550 1550  200  -- 200    Number of Times Voided -- 3 x 3 x -- -- --    Urine Void (mL) -- 1550 1550 200 -- 200    Total Output -- 1550 1550 200 -- 200       Net I/O     -- -1550 -1550 40 -- 40              Intake/Output Summary (Last 24 hours) at 2024 1257  Last data filed at 2024 1100  Gross per 24 hour   Intake 240 ml   Output 1750 ml   Net -1510 ml             oxyCODONE immediate-release  5 mg Q3HRS PRN    Or    oxyCODONE immediate-release  10 mg Q3HRS PRN    Or    HYDROmorphone  0.5 mg Q6HRS PRN    nicotine  14 mg Daily-0600    And    nicotine polacrilex  2 mg Q  HOUR PRN    bacitracin   DAILY    sertraline  25 mg DAILY    cloNIDine  0.1 mg TID PRN    gabapentin  300 mg Q8HRS    traZODone  50 mg QHS    heparin  0-30 Units/kg/hr Continuous    heparin  40 Units/kg PRN    Respiratory Therapy Consult   Continuous RT    Pharmacy Consult Request  1 Each PHARMACY TO DOSE    ondansetron  4 mg Q4HRS PRN    ondansetron  4 mg Q4HRS PRN    docusate sodium  100 mg BID    senna-docusate  1 Tablet Nightly    senna-docusate  1 Tablet Q24HRS PRN    polyethylene glycol/lytes  1 Packet BID    magnesium hydroxide  30 mL DAILY AT 1800    bisacodyl  10 mg Q24HRS PRN    sodium phosphate  1 Each Once PRN    acetaminophen  1,000 mg Q6HRS PRN    metaxalone  800 mg TID       Assessment and Plan:  POD #0 Left C7 facet fracture, with jumped facet C6/7  OR tomorrow; was postponed by Anesthesia due to + cocaine, amphetamines  CT neck soft tissue eval swallowing issues  Prophylactic anticoagulation: yes         Start date/time: on Heparin      Brain Compression: No  Ok to ambulate  Cervical collar on all times

## 2024-06-11 NOTE — PROGRESS NOTES
Trauma / Surgical Daily Progress Note    Date of Service  6/11/2024    Chief Complaint  39 y.o. male admitted 6/5/2024 with cervical neck injury after jumping off a 10 ft wall. History of poly-substance abuse and above knee DVT.     Interval Events  No critical events overnight.  No neurological deficits.   Heparin drip for subacute/chronic left lower extremity DVT.      - Tentatively to OR 5/12 for repair of cervical spine injury.    Review of Systems  Review of Systems   Constitutional: Negative.    Eyes: Negative.  Negative for double vision.   Respiratory: Negative.     Cardiovascular: Negative.    Gastrointestinal: Negative.    Genitourinary: Negative.    Musculoskeletal:  Positive for myalgias and neck pain.   Neurological: Negative.  Negative for dizziness and focal weakness.   Psychiatric/Behavioral: Negative.     All other systems reviewed and are negative.       Vital Signs  Temp:  [36.1 °C (97 °F)-36.9 °C (98.4 °F)] 36.9 °C (98.4 °F)  Pulse:  [79-93] 85  Resp:  [16-20] 20  BP: (103-127)/(66-76) 124/76  SpO2:  [92 %-94 %] 92 %    Physical Exam  Physical Exam  Vitals and nursing note reviewed.   Constitutional:       General: He is awake.      Appearance: Normal appearance. He is well-developed.      Interventions: Cervical collar in place.   Pulmonary:      Effort: No respiratory distress.   Chest:      Chest wall: No deformity, swelling, tenderness or crepitus.   Genitourinary:     Comments: Voiding.   Musculoskeletal:         General: Tenderness present. Normal range of motion.      Cervical back: Neck supple. Spinous process tenderness present.      Comments: 5/5 upper and lower extremity strength bilaterally.    Skin:     General: Skin is warm and dry.      Capillary Refill: Capillary refill takes less than 2 seconds.      Findings: Abrasion present.      Comments: Superficial abrasions to bilateral anterior feet.    Neurological:      Mental Status: He is alert and oriented to person, place, and  time. Mental status is at baseline.   Psychiatric:         Mood and Affect: Mood normal.         Behavior: Behavior is cooperative.         Laboratory  Recent Results (from the past 24 hour(s))   EKG    Collection Time: 06/10/24 12:46 PM   Result Value Ref Range    Report       [facility]    Test Date:  2024-06-10  Pt Name:    CHRISTIANA SMITH                Department: 131  MRN:        [id number]                      Room:       [facility]  Gender:     Male                         Technician: ELCORTNEY  :        1985                   Requested By:TOMMY KABA  Order #:    [id number]                    Reading MD: Logan Fraga MD    Measurements  Intervals                                Axis  Rate:       76                           P:          38  KY:         195                          QRS:        -28  QRSD:       105                          T:          -30  QT:         391  QTc:        440    Interpretive Statements  Sinus rhythm  Inferior infarct, age indeterminate  Electronically Signed On 06- 13:35:08 PDT by Logan Fraga MD     TROPONIN    Collection Time: 06/10/24  1:22 PM   Result Value Ref Range    Troponin T <6 6 - 19 ng/L   Heparin Anti-Xa    Collection Time: 06/10/24 10:27 PM   Result Value Ref Range    Heparin Xa (UFH) 0.29 IU/mL   CBC with Differential: Tomorrow AM    Collection Time: 24  7:30 AM   Result Value Ref Range    WBC 10.3 4.8 - 10.8 K/uL    RBC 4.61 (L) 4.70 - 6.10 M/uL    Hemoglobin 14.8 14.0 - 18.0 g/dL    Hematocrit 43.2 42.0 - 52.0 %    MCV 93.7 81.4 - 97.8 fL    MCH 32.1 27.0 - 33.0 pg    MCHC 34.3 32.3 - 36.5 g/dL    RDW 46.7 35.9 - 50.0 fL    Platelet Count 277 164 - 446 K/uL    MPV 9.3 9.0 - 12.9 fL    Neutrophils-Polys 71.70 44.00 - 72.00 %    Lymphocytes 13.80 (L) 22.00 - 41.00 %    Monocytes 12.40 0.00 - 13.40 %    Eosinophils 1.10 0.00 - 6.90 %    Basophils 0.30 0.00 - 1.80 %    Immature Granulocytes 0.70 0.00 - 0.90 %    Nucleated RBC 0.00 0.00 - 0.20 /100 WBC     Neutrophils (Absolute) 7.41 1.82 - 7.42 K/uL    Lymphs (Absolute) 1.42 1.00 - 4.80 K/uL    Monos (Absolute) 1.28 (H) 0.00 - 0.85 K/uL    Eos (Absolute) 0.11 0.00 - 0.51 K/uL    Baso (Absolute) 0.03 0.00 - 0.12 K/uL    Immature Granulocytes (abs) 0.07 0.00 - 0.11 K/uL    NRBC (Absolute) 0.00 K/uL   Comp Metabolic Panel (CMP): Tomorrow AM    Collection Time: 06/11/24  7:30 AM   Result Value Ref Range    Sodium 132 (L) 135 - 145 mmol/L    Potassium 4.4 3.6 - 5.5 mmol/L    Chloride 95 (L) 96 - 112 mmol/L    Co2 23 20 - 33 mmol/L    Anion Gap 14.0 7.0 - 16.0    Glucose 114 (H) 65 - 99 mg/dL    Bun 11 8 - 22 mg/dL    Creatinine 0.75 0.50 - 1.40 mg/dL    Calcium 8.7 8.5 - 10.5 mg/dL    Correct Calcium 8.5 8.5 - 10.5 mg/dL    AST(SGOT) 100 (H) 12 - 45 U/L    ALT(SGPT) 189 (H) 2 - 50 U/L    Alkaline Phosphatase 144 (H) 30 - 99 U/L    Total Bilirubin 0.3 0.1 - 1.5 mg/dL    Albumin 4.3 3.2 - 4.9 g/dL    Total Protein 7.7 6.0 - 8.2 g/dL    Globulin 3.4 1.9 - 3.5 g/dL    A-G Ratio 1.3 g/dL   Phosphorus: Every Monday and Thursday AM    Collection Time: 06/11/24  7:30 AM   Result Value Ref Range    Phosphorus 3.4 2.5 - 4.5 mg/dL   Heparin Anti-Xa    Collection Time: 06/11/24  7:30 AM   Result Value Ref Range    Heparin Xa (UFH) 0.46 IU/mL   ESTIMATED GFR    Collection Time: 06/11/24  7:30 AM   Result Value Ref Range    GFR (CKD-EPI) 117 >60 mL/min/1.73 m 2       Fluids    Intake/Output Summary (Last 24 hours) at 6/11/2024 0908  Last data filed at 6/11/2024 0441  Gross per 24 hour   Intake --   Output 1550 ml   Net -1550 ml       Core Measures & Quality Metrics  Radiology images reviewed, Labs reviewed and Medications reviewed  Kaiser catheter: No Kaiser      DVT: Heparin drip.  DVT prophylaxis - mechanical: SCDs  Ulcer prophylaxis: No    Assessed for rehab: Patient was assess for and/or received rehabilitation services during this hospitalization    RAP Score Total: 4    CAGE Results: positive Blood Alcohol>0.08: no CAGE Score:  0  Total: POSITIVE  Assessment complete date: 6/5/2024  Intervention: Complete. Patient response to intervention: patient rreports he uses substances recreationally, declines intervention..   Patient demonstrates understanding of intervention. Patient does not agree to follow-up.   has not been contacted. Total ETOH intervention time: 15 - 30 mintues      Assessment/Plan  * Trauma- (present on admission)  Assessment & Plan  Jumped off an embankment ~ 10 ft height.  Trauma Green Activation.  Cosmo Ruiz MD. Trauma Surgery.     Acute deep vein thrombosis (DVT) of left lower extremity (HCC)- (present on admission)  Assessment & Plan  Subacute and chronic left lower extremity DVT noted on LE Duplex US (6/6).  Patient has a known history of DVT and has completed a 6mo course of Xarelto.   6/6 Heparin gtt initiated and okayed by neurosurgery.  Anti-Xa protocol.       Closed fracture of seventh cervical vertebra (HCC)- (present on admission)  Assessment & Plan  Left C7 superior facet fracture with jumped facet at C6/C7.  2.7 mm anterolisthesis C6 on C7  CTA within normal limits.  Definitive operative reduction and stabilization pending. 6/12  Cervical immobilization. Ok to mobilize with collar securely on.    Mitch Leon MD, PhD. Neurosurgeon. [facility].     Tobacco use- (present on admission)  Assessment & Plan  6/9 Nicotine replacement.   Cessation education.    Fracture three ribs-closed, right, initial encounter- (present on admission)  Assessment & Plan  subtle anterior right fourth through sixth rib fractures.  Aggressive multimodal pain management, and pulmonary hygiene. Serial chest radiographs.     No contraindication to deep vein thrombosis (DVT) prophylaxis- (present on admission)  Assessment & Plan  VTE prophylaxis initially contraindicated secondary to elevated bleeding risk.  6/6 Left lower extremity DVT identified. Heparin gtt initiated.     Laceration of head-  (present on admission)  Assessment & Plan  Stapled in ED.  Routine remove in 7 days       Discussed patient condition with Patient and trauma surgery, Dr. Cosmo Ruiz.

## 2024-06-11 NOTE — PROGRESS NOTES
A/P: CXR with no acute cardiopulmonary process. Rapid strep test negative. CTA neck with no fluid collection and patent appearing airway.  Patient is currently sleeping in room. In no acute distress.

## 2024-06-11 NOTE — PROGRESS NOTES
/76   Pulse 85   Temp 36.9 °C (98.4 °F) (Temporal)   Resp 20   Ht 1.829 m (6')   Wt 84 kg (185 lb 3 oz)   SpO2 92%     (S)  Increased secretions. Sore throat.  Difficulty swallowing.      (A/P)  Clearing secretions with yankar. Talking in complete sentences.  CXR this am with no acute cardiopulmonary disease. No leukocytosis on AM labs. Will check rapid strep test and follow up CXR. Nursing to place continuous pulse ox.  Patient discussed with rapid response team.  CT soft tissue neck ordered.

## 2024-06-11 NOTE — PROGRESS NOTES
Neurosurgery Progress Note    Subjective:  No acute events overnight, left index finger tip top of foot pain improved    Exam:  AAO, collar on, cooperative,   trace left tricep weakness  Sensation intact touch 4 extremities    BP  Min: 112/64  Max: 125/74  Pulse  Av.7  Min: 76  Max: 81  Resp  Av.3  Min: 16  Max: 17  Temp  Av.4 °C (97.5 °F)  Min: 36.1 °C (97 °F)  Max: 36.7 °C (98.1 °F)  SpO2  Av %  Min: 90 %  Max: 92 %    No data recorded    Recent Labs     24  0147 24  0055 06/10/24  0102   WBC 5.8 4.9 5.5   RBC 3.95* 3.98* 4.10*   HEMOGLOBIN 12.9* 12.9* 13.5*   HEMATOCRIT 38.0* 38.0* 39.4*   MCV 96.2 95.5 96.1   MCH 32.7 32.4 32.9   MCHC 33.9 33.9 34.3   RDW 46.2 46.1 47.0   PLATELETCT 182 223 248   MPV 10.1 9.5 9.5     Recent Labs     24  0147 24  0055 06/10/24  0102   SODIUM 140 137 136   POTASSIUM 4.0 4.0 4.3   CHLORIDE 107 103 101   CO2 20 23 22   GLUCOSE 113* 115* 106*   BUN 13 11 10   CREATININE 0.61 0.70 0.72   CALCIUM 8.4* 8.9 8.9                 Intake/Output                         24 - 06/10/24 0659 06/10/24 0700 - 2459      Total  Total                 Intake    P.O.  600  -- 600  --  -- --    P.O. 600 -- 600 -- -- --    Total Intake 600 -- 600 -- -- --       Output    Urine  --  100 100  --  -- --    Number of Times Voided -- 1 x 1 x -- -- --    Urine Void (mL) -- 100 100 -- -- --    Total Output -- 100 100 -- -- --       Net I/O     600 -100 500 -- -- --              Intake/Output Summary (Last 24 hours) at 6/10/2024 193  Last data filed at 6/10/2024 0412  Gross per 24 hour   Intake --   Output 100 ml   Net -100 ml             nicotine  14 mg Daily-0600    And    nicotine polacrilex  2 mg Q HOUR PRN    bacitracin   DAILY    sertraline  25 mg DAILY    cloNIDine  0.1 mg TID PRN    gabapentin  300 mg Q8HRS    oxyCODONE immediate-release  5 mg Q3HRS PRN    Or    oxyCODONE immediate-release  10 mg Q3HRS  PRN    Or    HYDROmorphone  0.5 mg Q3HRS PRN    traZODone  50 mg QHS    heparin  0-30 Units/kg/hr Continuous    heparin  40 Units/kg PRN    Respiratory Therapy Consult   Continuous RT    Pharmacy Consult Request  1 Each PHARMACY TO DOSE    ondansetron  4 mg Q4HRS PRN    ondansetron  4 mg Q4HRS PRN    docusate sodium  100 mg BID    senna-docusate  1 Tablet Nightly    senna-docusate  1 Tablet Q24HRS PRN    polyethylene glycol/lytes  1 Packet BID    magnesium hydroxide  30 mL DAILY AT 1800    bisacodyl  10 mg Q24HRS PRN    sodium phosphate  1 Each Once PRN    acetaminophen  1,000 mg Q6HRS PRN    metaxalone  800 mg TID       Assessment and Plan:  POD #0 Left C7 facet fracture, with jumped facet C6/7  NM as above - stable  OR Wed; was postponed by Anesthesia due to + cocaine, amphetamines  Prophylactic anticoagulation: yes         Start date/time: on Heparin      Brain Compression: No  Ok to ambulate  Cervical collar on all times

## 2024-06-11 NOTE — CARE PLAN
The patient is Stable - Low risk of patient condition declining or worsening    Shift Goals  Clinical Goals: Pain control, improve swallowing  Patient Goals: Pain control, sleep  Family Goals: N/A    Progress made toward(s) clinical / shift goals:        Problem: Pain - Standard  Goal: Alleviation of pain or a reduction in pain to the patient’s comfort goal  Outcome: Progressing     Problem: Knowledge Deficit - Standard  Goal: Patient and family/care givers will demonstrate understanding of plan of care, disease process/condition, diagnostic tests and medications  Outcome: Progressing     Problem: Skin Integrity  Goal: Skin integrity is maintained or improved  Outcome: Progressing     Problem: Fall Risk  Goal: Patient will remain free from falls  Outcome: Progressing     Problem: Neuro Status  Goal: Neuro status will remain stable or improve  Outcome: Progressing       Patient is not progressing towards the following goals:

## 2024-06-12 ENCOUNTER — APPOINTMENT (OUTPATIENT)
Dept: RADIOLOGY | Facility: MEDICAL CENTER | Age: 39
DRG: 519 | End: 2024-06-12
Attending: NEUROLOGICAL SURGERY
Payer: COMMERCIAL

## 2024-06-12 ENCOUNTER — ANESTHESIA EVENT (OUTPATIENT)
Dept: SURGERY | Facility: MEDICAL CENTER | Age: 39
DRG: 519 | End: 2024-06-12
Payer: COMMERCIAL

## 2024-06-12 ENCOUNTER — ANESTHESIA (OUTPATIENT)
Dept: SURGERY | Facility: MEDICAL CENTER | Age: 39
DRG: 519 | End: 2024-06-12
Payer: COMMERCIAL

## 2024-06-12 LAB
ALBUMIN SERPL BCP-MCNC: 4.4 G/DL (ref 3.2–4.9)
ALBUMIN/GLOB SERPL: 1.2 G/DL
ALP SERPL-CCNC: 130 U/L (ref 30–99)
ALT SERPL-CCNC: 135 U/L (ref 2–50)
ANION GAP SERPL CALC-SCNC: 16 MMOL/L (ref 7–16)
AST SERPL-CCNC: 51 U/L (ref 12–45)
BASOPHILS # BLD AUTO: 0.4 % (ref 0–1.8)
BASOPHILS # BLD: 0.05 K/UL (ref 0–0.12)
BILIRUB SERPL-MCNC: 0.4 MG/DL (ref 0.1–1.5)
BUN SERPL-MCNC: 15 MG/DL (ref 8–22)
CALCIUM ALBUM COR SERPL-MCNC: 9.2 MG/DL (ref 8.5–10.5)
CALCIUM SERPL-MCNC: 9.5 MG/DL (ref 8.5–10.5)
CHLORIDE SERPL-SCNC: 99 MMOL/L (ref 96–112)
CO2 SERPL-SCNC: 22 MMOL/L (ref 20–33)
CREAT SERPL-MCNC: 0.78 MG/DL (ref 0.5–1.4)
EOSINOPHIL # BLD AUTO: 0.15 K/UL (ref 0–0.51)
EOSINOPHIL NFR BLD: 1.3 % (ref 0–6.9)
ERYTHROCYTE [DISTWIDTH] IN BLOOD BY AUTOMATED COUNT: 45.9 FL (ref 35.9–50)
GFR SERPLBLD CREATININE-BSD FMLA CKD-EPI: 116 ML/MIN/1.73 M 2
GLOBULIN SER CALC-MCNC: 3.8 G/DL (ref 1.9–3.5)
GLUCOSE SERPL-MCNC: 109 MG/DL (ref 65–99)
HCT VFR BLD AUTO: 43.1 % (ref 42–52)
HGB BLD-MCNC: 15 G/DL (ref 14–18)
IMM GRANULOCYTES # BLD AUTO: 0.09 K/UL (ref 0–0.11)
IMM GRANULOCYTES NFR BLD AUTO: 0.8 % (ref 0–0.9)
LYMPHOCYTES # BLD AUTO: 1.7 K/UL (ref 1–4.8)
LYMPHOCYTES NFR BLD: 14.8 % (ref 22–41)
MCH RBC QN AUTO: 32.6 PG (ref 27–33)
MCHC RBC AUTO-ENTMCNC: 34.8 G/DL (ref 32.3–36.5)
MCV RBC AUTO: 93.7 FL (ref 81.4–97.8)
MONOCYTES # BLD AUTO: 1.67 K/UL (ref 0–0.85)
MONOCYTES NFR BLD AUTO: 14.5 % (ref 0–13.4)
NEUTROPHILS # BLD AUTO: 7.85 K/UL (ref 1.82–7.42)
NEUTROPHILS NFR BLD: 68.2 % (ref 44–72)
NRBC # BLD AUTO: 0 K/UL
NRBC BLD-RTO: 0 /100 WBC (ref 0–0.2)
PHOSPHATE SERPL-MCNC: 3.1 MG/DL (ref 2.5–4.5)
PLATELET # BLD AUTO: 284 K/UL (ref 164–446)
PMV BLD AUTO: 9.3 FL (ref 9–12.9)
POTASSIUM SERPL-SCNC: 4.2 MMOL/L (ref 3.6–5.5)
PROT SERPL-MCNC: 8.2 G/DL (ref 6–8.2)
RBC # BLD AUTO: 4.6 M/UL (ref 4.7–6.1)
SODIUM SERPL-SCNC: 137 MMOL/L (ref 135–145)
UFH PPP CHRO-ACNC: <0.1 IU/ML
WBC # BLD AUTO: 11.5 K/UL (ref 4.8–10.8)

## 2024-06-12 PROCEDURE — 110371 HCHG SHELL REV 272: Performed by: NEUROLOGICAL SURGERY

## 2024-06-12 PROCEDURE — 700111 HCHG RX REV CODE 636 W/ 250 OVERRIDE (IP): Mod: JZ | Performed by: ANESTHESIOLOGY

## 2024-06-12 PROCEDURE — 160009 HCHG ANES TIME/MIN: Performed by: NEUROLOGICAL SURGERY

## 2024-06-12 PROCEDURE — 700111 HCHG RX REV CODE 636 W/ 250 OVERRIDE (IP): Performed by: NURSE PRACTITIONER

## 2024-06-12 PROCEDURE — A9270 NON-COVERED ITEM OR SERVICE: HCPCS | Performed by: NEUROLOGICAL SURGERY

## 2024-06-12 PROCEDURE — 700102 HCHG RX REV CODE 250 W/ 637 OVERRIDE(OP): Performed by: NURSE PRACTITIONER

## 2024-06-12 PROCEDURE — 3E0R329 INTRODUCTION OF OTHER ANTI-INFECTIVE INTO SPINAL CANAL, PERCUTANEOUS APPROACH: ICD-10-PCS | Performed by: NEUROLOGICAL SURGERY

## 2024-06-12 PROCEDURE — 160048 HCHG OR STATISTICAL LEVEL 1-5: Performed by: NEUROLOGICAL SURGERY

## 2024-06-12 PROCEDURE — 700101 HCHG RX REV CODE 250: Performed by: ANESTHESIOLOGY

## 2024-06-12 PROCEDURE — 700105 HCHG RX REV CODE 258: Performed by: NEUROLOGICAL SURGERY

## 2024-06-12 PROCEDURE — 700111 HCHG RX REV CODE 636 W/ 250 OVERRIDE (IP): Performed by: NEUROLOGICAL SURGERY

## 2024-06-12 PROCEDURE — 160002 HCHG RECOVERY MINUTES (STAT): Performed by: NEUROLOGICAL SURGERY

## 2024-06-12 PROCEDURE — A9270 NON-COVERED ITEM OR SERVICE: HCPCS | Performed by: NURSE PRACTITIONER

## 2024-06-12 PROCEDURE — 700102 HCHG RX REV CODE 250 W/ 637 OVERRIDE(OP): Performed by: REGISTERED NURSE

## 2024-06-12 PROCEDURE — 700102 HCHG RX REV CODE 250 W/ 637 OVERRIDE(OP): Performed by: NEUROLOGICAL SURGERY

## 2024-06-12 PROCEDURE — A9270 NON-COVERED ITEM OR SERVICE: HCPCS | Performed by: REGISTERED NURSE

## 2024-06-12 PROCEDURE — 110454 HCHG SHELL REV 250: Performed by: NEUROLOGICAL SURGERY

## 2024-06-12 PROCEDURE — 3E0V0GB INTRODUCTION OF RECOMBINANT BONE MORPHOGENETIC PROTEIN INTO BONES, OPEN APPROACH: ICD-10-PCS | Performed by: NEUROLOGICAL SURGERY

## 2024-06-12 PROCEDURE — 160031 HCHG SURGERY MINUTES - 1ST 30 MINS LEVEL 5: Performed by: NEUROLOGICAL SURGERY

## 2024-06-12 PROCEDURE — 72020 X-RAY EXAM OF SPINE 1 VIEW: CPT

## 2024-06-12 PROCEDURE — 80053 COMPREHEN METABOLIC PANEL: CPT

## 2024-06-12 PROCEDURE — 700102 HCHG RX REV CODE 250 W/ 637 OVERRIDE(OP): Performed by: ANESTHESIOLOGY

## 2024-06-12 PROCEDURE — A9270 NON-COVERED ITEM OR SERVICE: HCPCS | Performed by: ANESTHESIOLOGY

## 2024-06-12 PROCEDURE — 160042 HCHG SURGERY MINUTES - EA ADDL 1 MIN LEVEL 5: Performed by: NEUROLOGICAL SURGERY

## 2024-06-12 PROCEDURE — 160035 HCHG PACU - 1ST 60 MINS PHASE I: Performed by: NEUROLOGICAL SURGERY

## 2024-06-12 PROCEDURE — 502240 HCHG MISC OR SUPPLY RC 0272: Performed by: NEUROLOGICAL SURGERY

## 2024-06-12 PROCEDURE — 0PS304Z REPOSITION CERVICAL VERTEBRA WITH INTERNAL FIXATION DEVICE, OPEN APPROACH: ICD-10-PCS | Performed by: NEUROLOGICAL SURGERY

## 2024-06-12 PROCEDURE — 85520 HEPARIN ASSAY: CPT

## 2024-06-12 PROCEDURE — 99232 SBSQ HOSP IP/OBS MODERATE 35: CPT

## 2024-06-12 PROCEDURE — 502000 HCHG MISC OR IMPLANTS RC 0278: Performed by: NEUROLOGICAL SURGERY

## 2024-06-12 PROCEDURE — 36415 COLL VENOUS BLD VENIPUNCTURE: CPT

## 2024-06-12 PROCEDURE — C1713 ANCHOR/SCREW BN/BN,TIS/BN: HCPCS | Performed by: NEUROLOGICAL SURGERY

## 2024-06-12 PROCEDURE — 85025 COMPLETE CBC W/AUTO DIFF WBC: CPT

## 2024-06-12 PROCEDURE — 700105 HCHG RX REV CODE 258: Performed by: ANESTHESIOLOGY

## 2024-06-12 PROCEDURE — 700101 HCHG RX REV CODE 250: Performed by: NEUROLOGICAL SURGERY

## 2024-06-12 PROCEDURE — 84100 ASSAY OF PHOSPHORUS: CPT

## 2024-06-12 PROCEDURE — 8E0WXBZ COMPUTER ASSISTED PROCEDURE OF TRUNK REGION: ICD-10-PCS | Performed by: NEUROLOGICAL SURGERY

## 2024-06-12 PROCEDURE — 770001 HCHG ROOM/CARE - MED/SURG/GYN PRIV*

## 2024-06-12 DEVICE — SCREW SET INFINITY (1EA): Type: IMPLANTABLE DEVICE | Site: SPINE CERVICAL | Status: FUNCTIONAL

## 2024-06-12 DEVICE — GRAFT BONE KIT INFUSE XX-SMALL: Type: IMPLANTABLE DEVICE | Site: SPINE CERVICAL | Status: FUNCTIONAL

## 2024-06-12 DEVICE — SCREW BONE INFINITY 3.5 X 14MM (1EA): Type: IMPLANTABLE DEVICE | Site: SPINE CERVICAL | Status: FUNCTIONAL

## 2024-06-12 DEVICE — SEALANT DURAL AUTOSPRAY ADHERUS (5EA/PK): Type: IMPLANTABLE DEVICE | Site: SPINE CERVICAL | Status: FUNCTIONAL

## 2024-06-12 DEVICE — MAGNIFUSE 1X5CM: Type: IMPLANTABLE DEVICE | Site: SPINE CERVICAL | Status: FUNCTIONAL

## 2024-06-12 DEVICE — IMPLANTABLE DEVICE: Type: IMPLANTABLE DEVICE | Site: SPINE CERVICAL | Status: FUNCTIONAL

## 2024-06-12 RX ORDER — MIDAZOLAM HYDROCHLORIDE 1 MG/ML
1 INJECTION INTRAMUSCULAR; INTRAVENOUS
Status: DISCONTINUED | OUTPATIENT
Start: 2024-06-12 | End: 2024-06-12 | Stop reason: HOSPADM

## 2024-06-12 RX ORDER — CEFAZOLIN SODIUM 1 G/3ML
INJECTION, POWDER, FOR SOLUTION INTRAMUSCULAR; INTRAVENOUS PRN
Status: DISCONTINUED | OUTPATIENT
Start: 2024-06-12 | End: 2024-06-12 | Stop reason: SURG

## 2024-06-12 RX ORDER — KETOROLAC TROMETHAMINE 15 MG/ML
INJECTION, SOLUTION INTRAMUSCULAR; INTRAVENOUS PRN
Status: DISCONTINUED | OUTPATIENT
Start: 2024-06-12 | End: 2024-06-12 | Stop reason: SURG

## 2024-06-12 RX ORDER — ONDANSETRON 2 MG/ML
4 INJECTION INTRAMUSCULAR; INTRAVENOUS
Status: DISCONTINUED | OUTPATIENT
Start: 2024-06-12 | End: 2024-06-12 | Stop reason: HOSPADM

## 2024-06-12 RX ORDER — LIDOCAINE HYDROCHLORIDE 20 MG/ML
INJECTION, SOLUTION EPIDURAL; INFILTRATION; INTRACAUDAL; PERINEURAL PRN
Status: DISCONTINUED | OUTPATIENT
Start: 2024-06-12 | End: 2024-06-12 | Stop reason: SURG

## 2024-06-12 RX ORDER — SODIUM CHLORIDE, SODIUM LACTATE, POTASSIUM CHLORIDE, CALCIUM CHLORIDE 600; 310; 30; 20 MG/100ML; MG/100ML; MG/100ML; MG/100ML
INJECTION, SOLUTION INTRAVENOUS
Status: DISCONTINUED | OUTPATIENT
Start: 2024-06-12 | End: 2024-06-12 | Stop reason: SURG

## 2024-06-12 RX ORDER — HYDROMORPHONE HYDROCHLORIDE 2 MG/ML
INJECTION, SOLUTION INTRAMUSCULAR; INTRAVENOUS; SUBCUTANEOUS PRN
Status: DISCONTINUED | OUTPATIENT
Start: 2024-06-12 | End: 2024-06-12 | Stop reason: SURG

## 2024-06-12 RX ORDER — OXYCODONE HCL 5 MG/5 ML
10 SOLUTION, ORAL ORAL
Status: COMPLETED | OUTPATIENT
Start: 2024-06-12 | End: 2024-06-12

## 2024-06-12 RX ORDER — OXYCODONE HCL 5 MG/5 ML
5 SOLUTION, ORAL ORAL
Status: COMPLETED | OUTPATIENT
Start: 2024-06-12 | End: 2024-06-12

## 2024-06-12 RX ORDER — LIDOCAINE HYDROCHLORIDE 40 MG/ML
SOLUTION TOPICAL PRN
Status: DISCONTINUED | OUTPATIENT
Start: 2024-06-12 | End: 2024-06-12 | Stop reason: SURG

## 2024-06-12 RX ORDER — HYDROMORPHONE HYDROCHLORIDE 1 MG/ML
0.4 INJECTION, SOLUTION INTRAMUSCULAR; INTRAVENOUS; SUBCUTANEOUS
Status: DISCONTINUED | OUTPATIENT
Start: 2024-06-12 | End: 2024-06-12 | Stop reason: HOSPADM

## 2024-06-12 RX ORDER — MEPERIDINE HYDROCHLORIDE 25 MG/ML
12.5 INJECTION INTRAMUSCULAR; INTRAVENOUS; SUBCUTANEOUS
Status: DISCONTINUED | OUTPATIENT
Start: 2024-06-12 | End: 2024-06-12 | Stop reason: HOSPADM

## 2024-06-12 RX ORDER — SODIUM CHLORIDE, SODIUM GLUCONATE, SODIUM ACETATE, POTASSIUM CHLORIDE AND MAGNESIUM CHLORIDE 526; 502; 368; 37; 30 MG/100ML; MG/100ML; MG/100ML; MG/100ML; MG/100ML
INJECTION, SOLUTION INTRAVENOUS
Status: DISCONTINUED | OUTPATIENT
Start: 2024-06-12 | End: 2024-06-12 | Stop reason: SURG

## 2024-06-12 RX ORDER — BUPIVACAINE HYDROCHLORIDE AND EPINEPHRINE 5; 5 MG/ML; UG/ML
INJECTION, SOLUTION EPIDURAL; INTRACAUDAL; PERINEURAL
Status: DISCONTINUED | OUTPATIENT
Start: 2024-06-12 | End: 2024-06-12 | Stop reason: HOSPADM

## 2024-06-12 RX ORDER — ACETAMINOPHEN 500 MG
1000 TABLET ORAL EVERY 6 HOURS
Status: DISCONTINUED | OUTPATIENT
Start: 2024-06-12 | End: 2024-06-14

## 2024-06-12 RX ORDER — METOPROLOL TARTRATE 1 MG/ML
INJECTION, SOLUTION INTRAVENOUS PRN
Status: DISCONTINUED | OUTPATIENT
Start: 2024-06-12 | End: 2024-06-12 | Stop reason: SURG

## 2024-06-12 RX ORDER — ONDANSETRON 2 MG/ML
INJECTION INTRAMUSCULAR; INTRAVENOUS PRN
Status: DISCONTINUED | OUTPATIENT
Start: 2024-06-12 | End: 2024-06-12 | Stop reason: SURG

## 2024-06-12 RX ORDER — HALOPERIDOL 5 MG/ML
1 INJECTION INTRAMUSCULAR
Status: DISCONTINUED | OUTPATIENT
Start: 2024-06-12 | End: 2024-06-12 | Stop reason: HOSPADM

## 2024-06-12 RX ORDER — CEFAZOLIN SODIUM 1 G/3ML
INJECTION, POWDER, FOR SOLUTION INTRAMUSCULAR; INTRAVENOUS
Status: DISCONTINUED | OUTPATIENT
Start: 2024-06-12 | End: 2024-06-12 | Stop reason: HOSPADM

## 2024-06-12 RX ORDER — CARBOXYMETHYLCELLULOSE SODIUM 5 MG/ML
1 SOLUTION/ DROPS OPHTHALMIC PRN
Status: DISCONTINUED | OUTPATIENT
Start: 2024-06-12 | End: 2024-06-15 | Stop reason: HOSPADM

## 2024-06-12 RX ORDER — HYDROMORPHONE HYDROCHLORIDE 1 MG/ML
1 INJECTION, SOLUTION INTRAMUSCULAR; INTRAVENOUS; SUBCUTANEOUS
Status: DISCONTINUED | OUTPATIENT
Start: 2024-06-12 | End: 2024-06-12 | Stop reason: HOSPADM

## 2024-06-12 RX ORDER — IPRATROPIUM BROMIDE AND ALBUTEROL SULFATE 2.5; .5 MG/3ML; MG/3ML
3 SOLUTION RESPIRATORY (INHALATION)
Status: DISCONTINUED | OUTPATIENT
Start: 2024-06-12 | End: 2024-06-12 | Stop reason: HOSPADM

## 2024-06-12 RX ORDER — ACETAMINOPHEN 500 MG
1000 TABLET ORAL EVERY 6 HOURS PRN
Status: DISCONTINUED | OUTPATIENT
Start: 2024-06-17 | End: 2024-06-14

## 2024-06-12 RX ORDER — DIPHENHYDRAMINE HYDROCHLORIDE 50 MG/ML
12.5 INJECTION INTRAMUSCULAR; INTRAVENOUS
Status: DISCONTINUED | OUTPATIENT
Start: 2024-06-12 | End: 2024-06-12 | Stop reason: HOSPADM

## 2024-06-12 RX ORDER — HYDROMORPHONE HYDROCHLORIDE 1 MG/ML
0.2 INJECTION, SOLUTION INTRAMUSCULAR; INTRAVENOUS; SUBCUTANEOUS
Status: DISCONTINUED | OUTPATIENT
Start: 2024-06-12 | End: 2024-06-12 | Stop reason: HOSPADM

## 2024-06-12 RX ORDER — SODIUM CHLORIDE, SODIUM GLUCONATE, SODIUM ACETATE, POTASSIUM CHLORIDE AND MAGNESIUM CHLORIDE 526; 502; 368; 37; 30 MG/100ML; MG/100ML; MG/100ML; MG/100ML; MG/100ML
500 INJECTION, SOLUTION INTRAVENOUS CONTINUOUS
Status: DISCONTINUED | OUTPATIENT
Start: 2024-06-12 | End: 2024-06-12 | Stop reason: HOSPADM

## 2024-06-12 RX ORDER — ROCURONIUM BROMIDE 10 MG/ML
INJECTION, SOLUTION INTRAVENOUS PRN
Status: DISCONTINUED | OUTPATIENT
Start: 2024-06-12 | End: 2024-06-12 | Stop reason: SURG

## 2024-06-12 RX ORDER — MIDAZOLAM HYDROCHLORIDE 1 MG/ML
INJECTION INTRAMUSCULAR; INTRAVENOUS PRN
Status: DISCONTINUED | OUTPATIENT
Start: 2024-06-12 | End: 2024-06-12 | Stop reason: SURG

## 2024-06-12 RX ADMIN — CEFAZOLIN 2 G: 1 INJECTION, POWDER, FOR SOLUTION INTRAMUSCULAR; INTRAVENOUS at 09:13

## 2024-06-12 RX ADMIN — HYDROMORPHONE HYDROCHLORIDE 1 MG: 2 INJECTION INTRAMUSCULAR; INTRAVENOUS; SUBCUTANEOUS at 09:50

## 2024-06-12 RX ADMIN — OXYCODONE HYDROCHLORIDE 10 MG: 5 SOLUTION ORAL at 11:52

## 2024-06-12 RX ADMIN — KETOROLAC TROMETHAMINE 15 MG: 15 INJECTION, SOLUTION INTRAMUSCULAR; INTRAVENOUS at 11:20

## 2024-06-12 RX ADMIN — HYDROMORPHONE HYDROCHLORIDE 2 MG: 2 INJECTION INTRAMUSCULAR; INTRAVENOUS; SUBCUTANEOUS at 10:35

## 2024-06-12 RX ADMIN — MIDAZOLAM HYDROCHLORIDE 2 MG: 2 INJECTION, SOLUTION INTRAMUSCULAR; INTRAVENOUS at 09:09

## 2024-06-12 RX ADMIN — METOPROLOL TARTRATE 5 MG: 5 INJECTION INTRAVENOUS at 09:30

## 2024-06-12 RX ADMIN — SENNOSIDES AND DOCUSATE SODIUM 1 TABLET: 50; 8.6 TABLET ORAL at 21:47

## 2024-06-12 RX ADMIN — FENTANYL CITRATE 25 MCG: 50 INJECTION, SOLUTION INTRAMUSCULAR; INTRAVENOUS at 11:55

## 2024-06-12 RX ADMIN — LIDOCAINE HYDROCHLORIDE 4 ML: 40 SOLUTION TOPICAL at 09:08

## 2024-06-12 RX ADMIN — METAXALONE 800 MG: 800 TABLET ORAL at 05:37

## 2024-06-12 RX ADMIN — FENTANYL CITRATE 50 MCG: 50 INJECTION, SOLUTION INTRAMUSCULAR; INTRAVENOUS at 12:07

## 2024-06-12 RX ADMIN — HYDROMORPHONE HYDROCHLORIDE 0.5 MG: 1 INJECTION, SOLUTION INTRAMUSCULAR; INTRAVENOUS; SUBCUTANEOUS at 05:38

## 2024-06-12 RX ADMIN — ACETAMINOPHEN 1000 MG: 500 TABLET, FILM COATED ORAL at 16:36

## 2024-06-12 RX ADMIN — SODIUM CHLORIDE, SODIUM GLUCONATE, SODIUM ACETATE, POTASSIUM CHLORIDE AND MAGNESIUM CHLORIDE: 526; 502; 368; 37; 30 INJECTION, SOLUTION INTRAVENOUS at 11:16

## 2024-06-12 RX ADMIN — GABAPENTIN 300 MG: 300 CAPSULE ORAL at 05:37

## 2024-06-12 RX ADMIN — DOCUSATE SODIUM 100 MG: 100 CAPSULE, LIQUID FILLED ORAL at 05:37

## 2024-06-12 RX ADMIN — SERTRALINE HYDROCHLORIDE 25 MG: 50 TABLET ORAL at 05:37

## 2024-06-12 RX ADMIN — SUGAMMADEX 200 MG: 100 INJECTION, SOLUTION INTRAVENOUS at 11:12

## 2024-06-12 RX ADMIN — OXYCODONE HYDROCHLORIDE 10 MG: 10 TABLET ORAL at 03:48

## 2024-06-12 RX ADMIN — CEFAZOLIN 2 G: 2 INJECTION, POWDER, FOR SOLUTION INTRAMUSCULAR; INTRAVENOUS at 16:37

## 2024-06-12 RX ADMIN — GABAPENTIN 300 MG: 300 CAPSULE ORAL at 21:46

## 2024-06-12 RX ADMIN — ROCURONIUM BROMIDE 20 MG: 50 INJECTION, SOLUTION INTRAVENOUS at 09:50

## 2024-06-12 RX ADMIN — TRAZODONE HYDROCHLORIDE 50 MG: 50 TABLET ORAL at 00:27

## 2024-06-12 RX ADMIN — LIDOCAINE HYDROCHLORIDE 50 MG: 20 INJECTION, SOLUTION EPIDURAL; INFILTRATION; INTRACAUDAL at 09:08

## 2024-06-12 RX ADMIN — MORPHINE SULFATE: 50 INJECTION, SOLUTION INTRAVENOUS at 14:13

## 2024-06-12 RX ADMIN — ACETAMINOPHEN 1000 MG: 500 TABLET, FILM COATED ORAL at 14:41

## 2024-06-12 RX ADMIN — ROCURONIUM BROMIDE 80 MG: 50 INJECTION, SOLUTION INTRAVENOUS at 09:08

## 2024-06-12 RX ADMIN — CEFAZOLIN 2 G: 2 INJECTION, POWDER, FOR SOLUTION INTRAMUSCULAR; INTRAVENOUS at 21:49

## 2024-06-12 RX ADMIN — METAXALONE 800 MG: 800 TABLET ORAL at 16:36

## 2024-06-12 RX ADMIN — POLYETHYLENE GLYCOL 3350 1 PACKET: 17 POWDER, FOR SOLUTION ORAL at 05:38

## 2024-06-12 RX ADMIN — GABAPENTIN 300 MG: 300 CAPSULE ORAL at 14:41

## 2024-06-12 RX ADMIN — DOCUSATE SODIUM 100 MG: 100 CAPSULE, LIQUID FILLED ORAL at 16:36

## 2024-06-12 RX ADMIN — SODIUM CHLORIDE, POTASSIUM CHLORIDE, SODIUM LACTATE AND CALCIUM CHLORIDE: 600; 310; 30; 20 INJECTION, SOLUTION INTRAVENOUS at 09:03

## 2024-06-12 RX ADMIN — FENTANYL CITRATE 25 MCG: 50 INJECTION, SOLUTION INTRAMUSCULAR; INTRAVENOUS at 12:12

## 2024-06-12 RX ADMIN — HYDROMORPHONE HYDROCHLORIDE 1 MG: 2 INJECTION INTRAMUSCULAR; INTRAVENOUS; SUBCUTANEOUS at 10:05

## 2024-06-12 RX ADMIN — OXYCODONE HYDROCHLORIDE 10 MG: 10 TABLET ORAL at 00:27

## 2024-06-12 RX ADMIN — ONDANSETRON 4 MG: 2 INJECTION INTRAMUSCULAR; INTRAVENOUS at 11:20

## 2024-06-12 RX ADMIN — PROPOFOL 150 MG: 10 INJECTION, EMULSION INTRAVENOUS at 09:08

## 2024-06-12 RX ADMIN — FENTANYL CITRATE 100 MCG: 50 INJECTION, SOLUTION INTRAMUSCULAR; INTRAVENOUS at 09:09

## 2024-06-12 RX ADMIN — BACITRACIN ZINC: 500 OINTMENT TOPICAL at 05:37

## 2024-06-12 RX ADMIN — Medication 1 LOZENGE: at 22:19

## 2024-06-12 ASSESSMENT — ENCOUNTER SYMPTOMS
FOCAL WEAKNESS: 0
DIZZINESS: 0
MYALGIAS: 1
FEVER: 0
CHILLS: 0
NECK PAIN: 1

## 2024-06-12 ASSESSMENT — PAIN DESCRIPTION - PAIN TYPE
TYPE: SURGICAL PAIN
TYPE: ACUTE PAIN
TYPE: SURGICAL PAIN
TYPE: ACUTE PAIN
TYPE: ACUTE PAIN
TYPE: SURGICAL PAIN
TYPE: ACUTE PAIN

## 2024-06-12 ASSESSMENT — PAIN SCALES - GENERAL: PAIN_LEVEL: 4

## 2024-06-12 NOTE — PROGRESS NOTES
Trauma / Surgical Daily Progress Note    Date of Service  6/12/2024    Chief Complaint  39 y.o. male admitted 6/5/2024 with cervical neck injury after jumping off a 10 ft wall. History of poly-substance abuse and above knee DVT.     POD #1 left cervical 6/7 facetectomy.    Interval Events  Doing well postsurgery.  Returned with a PCA pump.  Oral pain medications held.    - Please have therapies see patient tomorrow.  -Aggressive pulmonary hygiene  -Mobilize out of bed and into chair.  Ambulate in the hallway.    Per bedside nurse, Dr. Briscoe would like the heparin drip to remain off until they remove the Hemovac.    Review of Systems  Review of Systems   Constitutional:  Negative for chills, fever and malaise/fatigue.   Musculoskeletal:  Positive for myalgias and neck pain.   Neurological:  Negative for dizziness and focal weakness.   All other systems reviewed and are negative.       Vital Signs  Temp:  [36.1 °C (96.9 °F)-36.9 °C (98.4 °F)] 36.5 °C (97.7 °F)  Pulse:  [72-97] 91  Resp:  [14-20] 16  BP: ()/(60-96) 146/85  SpO2:  [92 %-98 %] 93 %    Physical Exam  Physical Exam  Vitals and nursing note reviewed.   Constitutional:       General: He is awake.      Appearance: Normal appearance.      Interventions: Cervical collar in place.   HENT:      Head: Normocephalic.      Mouth/Throat:      Mouth: Mucous membranes are moist.   Eyes:      Pupils: Pupils are equal, round, and reactive to light.   Cardiovascular:      Rate and Rhythm: Normal rate.   Pulmonary:      Effort: No respiratory distress.   Chest:      Chest wall: No deformity, swelling, tenderness or crepitus.   Abdominal:      General: There is no distension.      Palpations: Abdomen is soft.   Genitourinary:     Comments: Voiding.   Musculoskeletal:         General: Tenderness present. Normal range of motion.      Cervical back: Neck supple. Spinous process tenderness present.      Comments: 5/5 upper and lower extremity strength bilaterally.     Skin:     General: Skin is warm and dry.      Capillary Refill: Capillary refill takes less than 2 seconds.      Findings: Abrasion present.      Comments: Superficial abrasions to bilateral anterior feet.    Neurological:      Mental Status: He is alert and oriented to person, place, and time. Mental status is at baseline.   Psychiatric:         Mood and Affect: Mood normal.         Laboratory  Recent Results (from the past 24 hour(s))   Heparin Xa (Unfractionated)    Collection Time: 06/11/24  3:07 PM   Result Value Ref Range    Heparin Xa (UFH) 0.47 IU/mL   CBC with Differential: Tomorrow AM    Collection Time: 06/12/24  6:46 AM   Result Value Ref Range    WBC 11.5 (H) 4.8 - 10.8 K/uL    RBC 4.60 (L) 4.70 - 6.10 M/uL    Hemoglobin 15.0 14.0 - 18.0 g/dL    Hematocrit 43.1 42.0 - 52.0 %    MCV 93.7 81.4 - 97.8 fL    MCH 32.6 27.0 - 33.0 pg    MCHC 34.8 32.3 - 36.5 g/dL    RDW 45.9 35.9 - 50.0 fL    Platelet Count 284 164 - 446 K/uL    MPV 9.3 9.0 - 12.9 fL    Neutrophils-Polys 68.20 44.00 - 72.00 %    Lymphocytes 14.80 (L) 22.00 - 41.00 %    Monocytes 14.50 (H) 0.00 - 13.40 %    Eosinophils 1.30 0.00 - 6.90 %    Basophils 0.40 0.00 - 1.80 %    Immature Granulocytes 0.80 0.00 - 0.90 %    Nucleated RBC 0.00 0.00 - 0.20 /100 WBC    Neutrophils (Absolute) 7.85 (H) 1.82 - 7.42 K/uL    Lymphs (Absolute) 1.70 1.00 - 4.80 K/uL    Monos (Absolute) 1.67 (H) 0.00 - 0.85 K/uL    Eos (Absolute) 0.15 0.00 - 0.51 K/uL    Baso (Absolute) 0.05 0.00 - 0.12 K/uL    Immature Granulocytes (abs) 0.09 0.00 - 0.11 K/uL    NRBC (Absolute) 0.00 K/uL   Comp Metabolic Panel (CMP): Tomorrow AM    Collection Time: 06/12/24  6:46 AM   Result Value Ref Range    Sodium 137 135 - 145 mmol/L    Potassium 4.2 3.6 - 5.5 mmol/L    Chloride 99 96 - 112 mmol/L    Co2 22 20 - 33 mmol/L    Anion Gap 16.0 7.0 - 16.0    Glucose 109 (H) 65 - 99 mg/dL    Bun 15 8 - 22 mg/dL    Creatinine 0.78 0.50 - 1.40 mg/dL    Calcium 9.5 8.5 - 10.5 mg/dL    Correct  Calcium 9.2 8.5 - 10.5 mg/dL    AST(SGOT) 51 (H) 12 - 45 U/L    ALT(SGPT) 135 (H) 2 - 50 U/L    Alkaline Phosphatase 130 (H) 30 - 99 U/L    Total Bilirubin 0.4 0.1 - 1.5 mg/dL    Albumin 4.4 3.2 - 4.9 g/dL    Total Protein 8.2 6.0 - 8.2 g/dL    Globulin 3.8 (H) 1.9 - 3.5 g/dL    A-G Ratio 1.2 g/dL   Phosphorus: Every Monday and Thursday AM    Collection Time: 06/12/24  6:46 AM   Result Value Ref Range    Phosphorus 3.1 2.5 - 4.5 mg/dL   ESTIMATED GFR    Collection Time: 06/12/24  6:46 AM   Result Value Ref Range    GFR (CKD-EPI) 116 >60 mL/min/1.73 m 2       Fluids    Intake/Output Summary (Last 24 hours) at 6/12/2024 1403  Last data filed at 6/12/2024 1356  Gross per 24 hour   Intake 1050 ml   Output 460 ml   Net 590 ml       Core Measures & Quality Metrics  Radiology images reviewed, Labs reviewed and Medications reviewed  Kaiser catheter: No Kaiser      DVT: Heparin drip.  DVT prophylaxis - mechanical: SCDs  Ulcer prophylaxis: No    Assessed for rehab: Patient was assess for and/or received rehabilitation services during this hospitalization    RAP Score Total: 4    CAGE Results: positive Blood Alcohol>0.08: no CAGE Score: 0  Total: POSITIVE  Assessment complete date: 6/5/2024  Intervention: Complete. Patient response to intervention: patient rreports he uses substances recreationally, declines intervention..   Patient demonstrates understanding of intervention. Patient does not agree to follow-up.   has not been contacted. Total ETOH intervention time: 15 - 30 mintues      Assessment/Plan  * Trauma- (present on admission)  Assessment & Plan  Jumped off an embankment ~ 10 ft height.  Trauma Green Activation.  Cosmo Ruiz MD. Trauma Surgery.     Acute deep vein thrombosis (DVT) of left lower extremity (HCC)- (present on admission)  Assessment & Plan  Subacute and chronic left lower extremity DVT noted on LE Duplex US (6/6).  Patient has a known history of DVT and has completed a 6mo course of  Xarelto.   6/6 Heparin gtt initiated and okayed by neurosurgery.  Anti-Xa protocol.       Closed fracture of seventh cervical vertebra (HCC)- (present on admission)  Assessment & Plan  Left C7 superior facet fracture with jumped facet at C6/C7.  2.7 mm anterolisthesis C6 on C7  CTA within normal limits.  6/12 Left cervical 6/7 facetectomy. Hemovac in place.  Cervical immobilization. Ok to mobilize with collar securely on.    Mitch Leon MD, PhD. Neurosurgeon. [facility].     Tobacco use- (present on admission)  Assessment & Plan  6/9 Nicotine replacement.   Cessation education.    Fracture three ribs-closed, right, initial encounter- (present on admission)  Assessment & Plan  subtle anterior right fourth through sixth rib fractures.  Aggressive multimodal pain management, and pulmonary hygiene. Serial chest radiographs.     No contraindication to deep vein thrombosis (DVT) prophylaxis- (present on admission)  Assessment & Plan  VTE prophylaxis initially contraindicated secondary to elevated bleeding risk.  6/6 Left lower extremity DVT identified. Heparin gtt initiated.     Laceration of head- (present on admission)  Assessment & Plan  Stapled in ED.  Routine remove in 7 days         Discussed patient condition with RN, Patient, and trauma surgery, Dr. Ruiz.

## 2024-06-12 NOTE — CARE PLAN
Problem: Pain - Standard  Goal: Alleviation of pain or a reduction in pain to the patient’s comfort goal  Outcome: Progressing     Problem: Knowledge Deficit - Standard  Goal: Patient and family/care givers will demonstrate understanding of plan of care, disease process/condition, diagnostic tests and medications  Outcome: Progressing     Problem: Skin Integrity  Goal: Skin integrity is maintained or improved  Outcome: Progressing     Problem: Fall Risk  Goal: Patient will remain free from falls  Outcome: Progressing     Problem: Neuro Status  Goal: Neuro status will remain stable or improve  Outcome: Progressing     Problem: Respiratory  Goal: Patient will achieve/maintain optimum respiratory ventilation and gas exchange  Outcome: Progressing     Problem: Infection - Standard  Goal: Patient will remain free from infection  Outcome: Progressing   The patient is Stable - Low risk of patient condition declining or worsening    Shift Goals  Clinical Goals: Pain management  Patient Goals: Pain management  Family Goals: Safety    Progress made toward(s) clinical / shift goals:      Patient is not progressing towards the following goals:

## 2024-06-12 NOTE — ANESTHESIA POSTPROCEDURE EVALUATION
Patient: Salvador Romo    Procedure Summary       Date: 06/12/24 Room / Location: [facility] 04 / SURGERY [facility]    Anesthesia Start: 0903 Anesthesia Stop: 1139    Procedure: FUSION, SPINE, CERVICAL, POSTERIOR APPROACH, WITH O-ARM IMAGING GUIDANCE - LEFT C6-7 FACETECTOMY WITH BILATERAL LATERAL MASS SCREW FUSION (Bilateral: Spine Cervical) Diagnosis: (Left C7 facet fracture)    Surgeons: Mitch Leon M.D. Responsible Provider: Toño Call III, M.D.    Anesthesia Type: general ASA Status: 3 - Emergent            Final Anesthesia Type: general  Last vitals  BP   Blood Pressure: (!) 146/85    Temp   36.5 °C (97.7 °F)    Pulse   91   Resp   16    SpO2   93 %      Anesthesia Post Evaluation    Patient location during evaluation: PACU  Patient participation: complete - patient participated  Level of consciousness: awake and alert  Pain score: 4    Airway patency: patent  Anesthetic complications: no  Cardiovascular status: hemodynamically stable  Respiratory status: acceptable  Hydration status: euvolemic    PONV: none          No notable events documented.     Nurse Pain Score: 4 (NPRS)

## 2024-06-12 NOTE — OR NURSING
1138: Pt arrives to PACU awake and alert. Moving all extremities. VSS. Posterior neck dressing CDI with hemovac and collar in place.     1235: Dressing CDI. Pt states pain is tolerable and denies nausea. Report called to Avi RODNEY.

## 2024-06-12 NOTE — CARE PLAN
The patient is Stable - Low risk of patient condition declining or worsening    Shift Goals  Clinical Goals: pain control, neuro checks, heparin drip  Patient Goals: pain control  Family Goals: not present      Problem: Pain - Standard  Goal: Alleviation of pain or a reduction in pain to the patient’s comfort goal  Outcome: Progressing     Problem: Knowledge Deficit - Standard  Goal: Patient and family/care givers will demonstrate understanding of plan of care, disease process/condition, diagnostic tests and medications  Outcome: Progressing     Problem: Neuro Status  Goal: Neuro status will remain stable or improve  Outcome: Progressing

## 2024-06-12 NOTE — DISCHARGE PLANNING
Case Management Discharge Planning    Admission Date: 6/5/2024  GMLOS: 3.2  ALOS: 7    6-Clicks ADL Score: 18  6-Clicks Mobility Score: 18      Anticipated Discharge Dispo: Discharge Disposition: Discharged to home/self care (01)  Discharge Address: [address] [place] [id number]  Discharge Contact Phone Number: [phone]    DME Needed: No    Action(s) Taken: Pt went to OR today for left cervical 6/7 facetectomy.  PT/OT to see the patient tomorrow for evaluation.  RN CM to follow for any discharge recommendations pending PT/OT eval.       Escalations Completed: None    Medically Clear: No    Next Steps: RN CM to continue to follow for DC planning      Barriers to Discharge: Medical clearance, PT/OT eval     Is the patient up for discharge tomorrow: No

## 2024-06-12 NOTE — ANESTHESIA PROCEDURE NOTES
Peripheral IV    Date/Time: 6/12/2024 9:25 AM    Performed by: Toño Call III, M.D.  Authorized by: Toño Call III, M.D.    Size:  18 G (long Jelco)  Laterality:  Right  Peripheral IV Location:  Hand  Local Anesthetic:  None  Site Prep:  Alcohol  Technique:  Direct puncture  Attempts:  1

## 2024-06-12 NOTE — OP REPORT
NEUROSURGERY OPERATIVE NOTE  DATE:  11:43 AM 2024    PATIENT NAME:  Salvador Romo   1985 MRN [id number]      PROCEDURE:  1.  Left cervical 6/7 facetectomy  2.  Bilateral lateral mass screw placement cervical 6, 7 (Infinity, Medtronic)  3.  Bone autograft obtained from same incision, bone allograft placement (BMP, Magnifuse, Medtronic)  4.  Stealth intraoperative navigation  5.  Open reduction left facet fracture    Surgeon:  Mitch Leon MD, PhD  Assistant:  none    Anesthesia:  GETA    Diagnosis: Left jumped facet, cervical 6/7    Indication: 39-year-old male with left cervical 6/7 jumped facet after a fall.  He has no neurologic deficits.  Open reduction of the joint facet with stabilization and decompression of the foramen is planned.    Procedure:  The patient was identified in the holding area, and the surgery site marked, consent was obtained.  The patient was brought back to the operating room and intubated by anesthesia service.  2 grams Ancef was administered intravenously.  His head was secured in [place] pin head wade, he was transferred to the OSI operating room table in a prone manner; all pressure points were well-padded.  His head was positioned in slightly flexed position.    His posterior neck was prepped with hair clipping, chlorhexidine scrub, Betadine scrub, and ChloraPrep.  Sterile drapes were applied including a layer of Ioban.  The correct vertebral level was identified fluoroscopically.  The planned midline incision was infiltrated 0.5% Marcaine with epinephrine.  The skin was incised sharply, dissection carried deeply using monopolar cautery.  The thoracic 1 through cervical 4 spinous processes and lamina were exposed in this manner.  This was verified fluoroscopically.  There was clear fluid emanating from the left cervical 6/7 facet joint which was clearly disrupted and perched.    The spinous process clamp was secured to the thoracic 1 spinous process, and  his cervical vertebral column registered to the [employer] registration system using the OArm; good registration was verified.  Lateral mass screws were placed in the bilateral cervical 6 and 7 levels.  The cortex was breached with a navigated drill, and a navigated hand drill used to drill the trajectory.  The path was tapped with a navigated tap, and appropriately sized screws placed.  OArm spin demonstrated good screw position.  The operative site was copiously irrigated with ancef/NS irrigation.      The left cervical 6/7 facet joint was obviously jumped.  The inferior cervical 6 superior 7 facet was removed with 2 mm Kerrison ronguers and the jumped facet reduced.  Gelfoam was placed in the exposed space, no CSF leak was then noted.  The bilateral cervical 6, 7 lateral masses and lamina were decorticated and the right cervical 6/7 facet joint drilled out.  BMP was placed over the decorticated bone and in the right cervical 6/7 facet joint followed by Magnifuse Magnifuse and bone obtained from drilling.  30 mm rods were placed, setscrews placed and final tightened.    A medium Hemovac drain was placed, brought out through a separate incision and secured to the skin using 2-0 nylon suture.  Stimulan vancomycin containing absorbable beads were then placed through the operative site.  The cervical muscle and fascia were reapproximated using 1-0 Vicryl suture in an interrupted inverted manner in separate layers.  The suprafascial space was copiously irrigated with Ancef normal saline irrigation.  Antibiotic beads were placed.  The dermis was reapproximated using 2-0 Vicryl suture in interrupted inverted manner.  The skin was reapproximated with staples.  A silver containing dressing was placed.  Final counts were correct.    FINDINGS: Left cervical 6/7 jumped facet.  Good decompression and stabilization achieved.  SPECIMEN:  None  DRAINS: Medium Hemovac  EBL:  <30 CC  COMPLICATIONS:  None apparent at end of  procedure.

## 2024-06-12 NOTE — CARE PLAN
The patient is Watcher - Medium risk of patient condition declining or worsening    Shift Goals  Clinical Goals: pain management, safety, neuro checks, heparin drip  Patient Goals: relief of sore throat  Family Goals: not present    Progress made toward(s) clinical / shift goals:  yes    Problem: Pain - Standard  Goal: Alleviation of pain or a reduction in pain to the patient’s comfort goal  Outcome: Progressing     Problem: Knowledge Deficit - Standard  Goal: Patient and family/care givers will demonstrate understanding of plan of care, disease process/condition, diagnostic tests and medications  Outcome: Progressing     Problem: Skin Integrity  Goal: Skin integrity is maintained or improved  Outcome: Progressing     Problem: Fall Risk  Goal: Patient will remain free from falls  Outcome: Progressing     Problem: Neuro Status  Goal: Neuro status will remain stable or improve  Outcome: Progressing     Problem: Respiratory  Goal: Patient will achieve/maintain optimum respiratory ventilation and gas exchange  Outcome: Progressing     Problem: Infection - Standard  Goal: Patient will remain free from infection  Outcome: Progressing  Flowsheets (Taken 6/11/2024 8011)  Standard Infection Interventions:   Assessed for signs and symptoms of infection   Implemented standard precautions   Instructed patient/family on signs and symptoms of infection   Provided education on proper hand hygiene and infection prevention measures   Assessed for removal IV, central lines, intra-arterial or urinary catheters

## 2024-06-12 NOTE — ANESTHESIA PREPROCEDURE EVALUATION
Case: [id number] Date/Time: 06/12/24 0815    Procedure: FUSION, SPINE, CERVICAL, POSTERIOR APPROACH, WITH O-ARM IMAGING GUIDANCE - LEFT C6-7 FACETECTOMY WITH BILATERAL LATERAL MASS SCREW FUSION    Location: [facility] OR 04 / SURGERY [facility]    Surgeons: Mitch Leon M.D.            Relevant Problems   CARDIAC   (positive) Acute deep vein thrombosis (DVT) of left lower extremity (HCC)       Physical Exam    Airway - unable to assess  Mallampati: III  TM distance: >3 FB  Neck ROM: limited       Cardiovascular - normal exam  Rhythm: regular  Rate: normal  (-) murmur     Dental - normal exam           Pulmonary - normal exam  Breath sounds clear to auscultation     Abdominal    Neurological - normal exam                   Anesthesia Plan    ASA 3- EMERGENT       Plan - general       Airway plan will be ETT          Induction: intravenous    Postoperative Plan: Postoperative administration of opioids is intended.    Pertinent diagnostic labs and testing reviewed    Informed Consent:    Anesthetic plan and risks discussed with patient.    Use of blood products discussed with: patient whom consented to blood products.

## 2024-06-12 NOTE — ANESTHESIA PROCEDURE NOTES
Airway    Date/Time: 6/12/2024 9:08 AM    Performed by: Toño Call III, M.D.  Authorized by: Toño Call III, M.D.    Location:  OR  Urgency:  Elective  Difficult Airway: No    Indications for Airway Management:  Anesthesia      Spontaneous Ventilation: absent    Sedation Level:  Deep  Preoxygenated: Yes    Patient Position:  Sniffing  Final Airway Type:  Endotracheal airway  Final Endotracheal Airway:  ETT  Cuffed: Yes    Technique Used for Successful ETT Placement:  Direct laryngoscopy    Insertion Site:  Oral  Blade Type:  Dozier  Laryngoscope Blade/Videolaryngoscope Blade Size:  3  ETT Size (mm):  7.5  Measured from:  Lips  ETT to Lips (cm):  22  Placement Verified by: auscultation and capnometry    Cormack-Lehane Classification:  Grade IV - neither glottis nor epiglottis seen  Number of Attempts at Approach:  1

## 2024-06-12 NOTE — ANESTHESIA TIME REPORT
Anesthesia Start and Stop Event Times       Date Time Event    6/12/2024 0834 Ready for Procedure     0903 Anesthesia Start     1139 Anesthesia Stop          Responsible Staff  06/12/24      Name Role Begin End    Toño Call III, M.D. Anesth 0903 1139          Overtime Reason:  no overtime (within assigned shift)    Comments:

## 2024-06-12 NOTE — PROGRESS NOTES
Patient came back from surgery and was running Heparin prior to surgery. Informed Dr. Leon if Heparin needed to start post-op. Dr. Leon informed me to hold on Heparin until drains are pulled. Informed AMARILIS Quintana.

## 2024-06-13 LAB
ALBUMIN SERPL BCP-MCNC: 4.2 G/DL (ref 3.2–4.9)
ALBUMIN/GLOB SERPL: 1.2 G/DL
ALP SERPL-CCNC: 128 U/L (ref 30–99)
ALT SERPL-CCNC: 79 U/L (ref 2–50)
ANION GAP SERPL CALC-SCNC: 15 MMOL/L (ref 7–16)
AST SERPL-CCNC: 34 U/L (ref 12–45)
BASOPHILS # BLD AUTO: 0.4 % (ref 0–1.8)
BASOPHILS # BLD: 0.04 K/UL (ref 0–0.12)
BILIRUB SERPL-MCNC: 0.2 MG/DL (ref 0.1–1.5)
BUN SERPL-MCNC: 16 MG/DL (ref 8–22)
CALCIUM ALBUM COR SERPL-MCNC: 8.9 MG/DL (ref 8.5–10.5)
CALCIUM SERPL-MCNC: 9.1 MG/DL (ref 8.5–10.5)
CHLORIDE SERPL-SCNC: 101 MMOL/L (ref 96–112)
CO2 SERPL-SCNC: 23 MMOL/L (ref 20–33)
CREAT SERPL-MCNC: 0.64 MG/DL (ref 0.5–1.4)
EOSINOPHIL # BLD AUTO: 0.13 K/UL (ref 0–0.51)
EOSINOPHIL NFR BLD: 1.2 % (ref 0–6.9)
ERYTHROCYTE [DISTWIDTH] IN BLOOD BY AUTOMATED COUNT: 46 FL (ref 35.9–50)
GFR SERPLBLD CREATININE-BSD FMLA CKD-EPI: 123 ML/MIN/1.73 M 2
GLOBULIN SER CALC-MCNC: 3.4 G/DL (ref 1.9–3.5)
GLUCOSE SERPL-MCNC: 113 MG/DL (ref 65–99)
HCT VFR BLD AUTO: 40.2 % (ref 42–52)
HGB BLD-MCNC: 13.9 G/DL (ref 14–18)
IMM GRANULOCYTES # BLD AUTO: 0.08 K/UL (ref 0–0.11)
IMM GRANULOCYTES NFR BLD AUTO: 0.7 % (ref 0–0.9)
LYMPHOCYTES # BLD AUTO: 2.19 K/UL (ref 1–4.8)
LYMPHOCYTES NFR BLD: 19.8 % (ref 22–41)
MAGNESIUM SERPL-MCNC: 2.3 MG/DL (ref 1.5–2.5)
MCH RBC QN AUTO: 32.6 PG (ref 27–33)
MCHC RBC AUTO-ENTMCNC: 34.6 G/DL (ref 32.3–36.5)
MCV RBC AUTO: 94.4 FL (ref 81.4–97.8)
MONOCYTES # BLD AUTO: 1.53 K/UL (ref 0–0.85)
MONOCYTES NFR BLD AUTO: 13.9 % (ref 0–13.4)
NEUTROPHILS # BLD AUTO: 7.07 K/UL (ref 1.82–7.42)
NEUTROPHILS NFR BLD: 64 % (ref 44–72)
NRBC # BLD AUTO: 0 K/UL
NRBC BLD-RTO: 0 /100 WBC (ref 0–0.2)
PHOSPHATE SERPL-MCNC: 3.1 MG/DL (ref 2.5–4.5)
PLATELET # BLD AUTO: 310 K/UL (ref 164–446)
PMV BLD AUTO: 9.4 FL (ref 9–12.9)
POTASSIUM SERPL-SCNC: 3.8 MMOL/L (ref 3.6–5.5)
PROT SERPL-MCNC: 7.6 G/DL (ref 6–8.2)
RBC # BLD AUTO: 4.26 M/UL (ref 4.7–6.1)
SODIUM SERPL-SCNC: 139 MMOL/L (ref 135–145)
WBC # BLD AUTO: 11 K/UL (ref 4.8–10.8)

## 2024-06-13 PROCEDURE — 99232 SBSQ HOSP IP/OBS MODERATE 35: CPT

## 2024-06-13 PROCEDURE — 700102 HCHG RX REV CODE 250 W/ 637 OVERRIDE(OP)

## 2024-06-13 PROCEDURE — 84100 ASSAY OF PHOSPHORUS: CPT

## 2024-06-13 PROCEDURE — A9270 NON-COVERED ITEM OR SERVICE: HCPCS | Performed by: NURSE PRACTITIONER

## 2024-06-13 PROCEDURE — 700102 HCHG RX REV CODE 250 W/ 637 OVERRIDE(OP): Performed by: NURSE PRACTITIONER

## 2024-06-13 PROCEDURE — A9270 NON-COVERED ITEM OR SERVICE: HCPCS

## 2024-06-13 PROCEDURE — A9270 NON-COVERED ITEM OR SERVICE: HCPCS | Performed by: NEUROLOGICAL SURGERY

## 2024-06-13 PROCEDURE — 770001 HCHG ROOM/CARE - MED/SURG/GYN PRIV*

## 2024-06-13 PROCEDURE — 97535 SELF CARE MNGMENT TRAINING: CPT

## 2024-06-13 PROCEDURE — 700102 HCHG RX REV CODE 250 W/ 637 OVERRIDE(OP): Performed by: NEUROLOGICAL SURGERY

## 2024-06-13 PROCEDURE — 85025 COMPLETE CBC W/AUTO DIFF WBC: CPT

## 2024-06-13 PROCEDURE — A9270 NON-COVERED ITEM OR SERVICE: HCPCS | Performed by: REGISTERED NURSE

## 2024-06-13 PROCEDURE — 80053 COMPREHEN METABOLIC PANEL: CPT

## 2024-06-13 PROCEDURE — 97530 THERAPEUTIC ACTIVITIES: CPT

## 2024-06-13 PROCEDURE — 700102 HCHG RX REV CODE 250 W/ 637 OVERRIDE(OP): Performed by: REGISTERED NURSE

## 2024-06-13 PROCEDURE — 700111 HCHG RX REV CODE 636 W/ 250 OVERRIDE (IP): Performed by: NEUROLOGICAL SURGERY

## 2024-06-13 PROCEDURE — 36415 COLL VENOUS BLD VENIPUNCTURE: CPT

## 2024-06-13 PROCEDURE — 700105 HCHG RX REV CODE 258: Performed by: NEUROLOGICAL SURGERY

## 2024-06-13 PROCEDURE — 83735 ASSAY OF MAGNESIUM: CPT

## 2024-06-13 RX ORDER — CLONIDINE HYDROCHLORIDE 0.1 MG/1
0.1 TABLET ORAL 3 TIMES DAILY PRN
Status: CANCELLED | OUTPATIENT
Start: 2024-06-13

## 2024-06-13 RX ADMIN — METAXALONE 800 MG: 800 TABLET ORAL at 11:56

## 2024-06-13 RX ADMIN — METAXALONE 800 MG: 800 TABLET ORAL at 04:39

## 2024-06-13 RX ADMIN — METAXALONE 800 MG: 800 TABLET ORAL at 16:47

## 2024-06-13 RX ADMIN — TRAZODONE HYDROCHLORIDE 50 MG: 50 TABLET ORAL at 00:35

## 2024-06-13 RX ADMIN — Medication 1 LOZENGE: at 04:59

## 2024-06-13 RX ADMIN — CEFAZOLIN 2 G: 2 INJECTION, POWDER, FOR SOLUTION INTRAMUSCULAR; INTRAVENOUS at 21:15

## 2024-06-13 RX ADMIN — CEFAZOLIN 2 G: 2 INJECTION, POWDER, FOR SOLUTION INTRAMUSCULAR; INTRAVENOUS at 16:46

## 2024-06-13 RX ADMIN — NICOTINE POLACRILEX 2 MG: 2 GUM, CHEWING BUCCAL at 04:58

## 2024-06-13 RX ADMIN — MORPHINE SULFATE: 50 INJECTION, SOLUTION INTRAVENOUS at 07:34

## 2024-06-13 RX ADMIN — ACETAMINOPHEN 1000 MG: 500 TABLET, FILM COATED ORAL at 00:35

## 2024-06-13 RX ADMIN — ACETAMINOPHEN 1000 MG: 500 TABLET, FILM COATED ORAL at 11:56

## 2024-06-13 RX ADMIN — SENNOSIDES AND DOCUSATE SODIUM 1 TABLET: 50; 8.6 TABLET ORAL at 21:12

## 2024-06-13 RX ADMIN — CLONIDINE HYDROCHLORIDE 0.1 MG: 0.1 TABLET ORAL at 10:32

## 2024-06-13 RX ADMIN — GABAPENTIN 300 MG: 300 CAPSULE ORAL at 16:47

## 2024-06-13 RX ADMIN — GABAPENTIN 300 MG: 300 CAPSULE ORAL at 04:39

## 2024-06-13 RX ADMIN — ACETAMINOPHEN 1000 MG: 500 TABLET, FILM COATED ORAL at 16:47

## 2024-06-13 RX ADMIN — POLYETHYLENE GLYCOL 3350 1 PACKET: 17 POWDER, FOR SOLUTION ORAL at 04:38

## 2024-06-13 RX ADMIN — Medication 1 LOZENGE: at 23:35

## 2024-06-13 RX ADMIN — GABAPENTIN 300 MG: 300 CAPSULE ORAL at 21:12

## 2024-06-13 RX ADMIN — TRAZODONE HYDROCHLORIDE 50 MG: 50 TABLET ORAL at 23:35

## 2024-06-13 RX ADMIN — DOCUSATE SODIUM 100 MG: 100 CAPSULE, LIQUID FILLED ORAL at 04:39

## 2024-06-13 RX ADMIN — ACETAMINOPHEN 1000 MG: 500 TABLET, FILM COATED ORAL at 04:38

## 2024-06-13 RX ADMIN — CEFAZOLIN 2 G: 2 INJECTION, POWDER, FOR SOLUTION INTRAMUSCULAR; INTRAVENOUS at 04:37

## 2024-06-13 RX ADMIN — MORPHINE SULFATE: 50 INJECTION, SOLUTION INTRAVENOUS at 22:29

## 2024-06-13 RX ADMIN — BACITRACIN ZINC: 500 OINTMENT TOPICAL at 04:45

## 2024-06-13 RX ADMIN — DOCUSATE SODIUM 100 MG: 100 CAPSULE, LIQUID FILLED ORAL at 16:47

## 2024-06-13 RX ADMIN — SERTRALINE HYDROCHLORIDE 25 MG: 50 TABLET ORAL at 04:39

## 2024-06-13 ASSESSMENT — COGNITIVE AND FUNCTIONAL STATUS - GENERAL
DAILY ACTIVITIY SCORE: 21
SUGGESTED CMS G CODE MODIFIER DAILY ACTIVITY: CJ
WALKING IN HOSPITAL ROOM: A LITTLE
STANDING UP FROM CHAIR USING ARMS: A LITTLE
HELP NEEDED FOR BATHING: A LITTLE
PERSONAL GROOMING: A LITTLE
SUGGESTED CMS G CODE MODIFIER MOBILITY: CJ
MOVING TO AND FROM BED TO CHAIR: A LITTLE
MOBILITY SCORE: 20
DRESSING REGULAR UPPER BODY CLOTHING: A LITTLE
CLIMB 3 TO 5 STEPS WITH RAILING: A LITTLE

## 2024-06-13 ASSESSMENT — GAIT ASSESSMENTS
DISTANCE (FEET): 100
DEVIATION: BRADYKINETIC
GAIT LEVEL OF ASSIST: SUPERVISED

## 2024-06-13 ASSESSMENT — ENCOUNTER SYMPTOMS
WEAKNESS: 1
FEVER: 0
CHILLS: 0
NECK PAIN: 1
NERVOUS/ANXIOUS: 1
MYALGIAS: 1
DIZZINESS: 0

## 2024-06-13 ASSESSMENT — PAIN DESCRIPTION - PAIN TYPE
TYPE: SURGICAL PAIN
TYPE: ACUTE PAIN;SURGICAL PAIN
TYPE: ACUTE PAIN;SURGICAL PAIN

## 2024-06-13 NOTE — THERAPY
"Physical Therapy   Daily Treatment     Patient Name: Salvador Romo  Age:  39 y.o., Sex:  male  Medical Record #: [id number]  Today's Date: 6/13/2024     Precautions  Precautions: Fall Risk;Cervical Collar  ;Spinal / Back Precautions     Assessment    Pt received in bed and agreeable to PT session. Pt is now post-op C6-7 facetectomy and ORIF. No listed change in collar use, was collar AAT prior. Pt was able to mobilize to EOB, perform transfers with no AD, and walk a short distance with no AD. Pt reported some nausea and anxiety while ambulating, reporting maybe it was related to the hallway being busy at that time. Will follow for likely 1 additional tx to progress. Anticipate follow up with outpatient PT once cleared to by surgeon.     Plan    Treatment Plan Status: Modify Current Treatment Plan  Type of Treatment: Bed Mobility, Equipment, Gait Training, Neuro Re-Education / Balance, Self Care / Home Evaluation, Stair Training, Therapeutic Activities, Therapeutic Exercise  Treatment Frequency: 5 Times per Week  Treatment Duration: Until Therapy Goals Met    DC Equipment Recommendations: None  Discharge Recommendations: Recommend outpatient physical therapy services to address higher level deficits (when cleared by surgeon)      Subjective    \"There were so many people in the hallway, I just got a little anxious\"     Objective       06/13/24 0952   Precautions   Precautions Fall Risk;Cervical Collar  ;Spinal / Back Precautions    Vitals   O2 (LPM) 0   O2 Delivery Device None - Room Air   Pain 0 - 10 Group   Therapist Pain Assessment Post Activity Pain Same as Prior to Activity;Nurse Notified  (neck pain in sitting, improved in standing)   Cognition    Level of Consciousness Alert   Comments Pleasant and receptive, limited by anxiety when walking in the hallway due to number of people per pt   Balance   Sitting Balance (Static) Good   Sitting Balance (Dynamic) Fair +   Standing Balance (Static) Fair +   Standing " Balance (Dynamic) Fair   Weight Shift Sitting Good   Weight Shift Standing Fair   Skilled Intervention Verbal Cuing   Comments no AD   Bed Mobility    Supine to Sit Supervised   Sit to Supine Supervised   Scooting Supervised   Rolling Supervised   Skilled Intervention Verbal Cuing;Compensatory Strategies   Comments HOB 30 deg, bought wedges for home, log roll   Gait Analysis   Gait Level Of Assist Supervised   Assistive Device None   Distance (Feet) 100   # of Times Distance was Traveled 1   Deviation Bradykinetic   Skilled Intervention Verbal Cuing;Compensatory Strategies   Functional Mobility   Sit to Stand Supervised   Bed, Chair, Wheelchair Transfer Supervised   Transfer Method Stand Step   Mobility up with no AD   Skilled Intervention Verbal Cuing   6 Clicks Assessment - How much HELP from from another person do you currently need... (If the patient hasn't done an activity recently, how much help from another person do you think he/she would need if he/she tried?)   Turning from your back to your side while in a flat bed without using bedrails? 4   Moving from lying on your back to sitting on the side of a flat bed without using bedrails? 4   Moving to and from a bed to a chair (including a wheelchair)? 3   Standing up from a chair using your arms (e.g., wheelchair, or bedside chair)? 3   Walking in hospital room? 3   Climbing 3-5 steps with a railing? 3   6 clicks Mobility Score 20   Short Term Goals    Short Term Goal # 1 Pt will ambulate 300 ft without AD with SPV post-op to progress functional mobility   Goal Outcome # 1 goal not met   Short Term Goal # 2 Pt will ascend/descend 1 step with SPV post-op to progress functional mobility for home   Goal Outcome # 2 Goal not met   Physical Therapy Treatment Plan   Physical Therapy Treatment Plan Modify Current Treatment Plan   Treatment Frequency 5 Times per Week   Anticipated Discharge Equipment and Recommendations   DC Equipment Recommendations None   Discharge  Recommendations Recommend outpatient physical therapy services to address higher level deficits  (when cleared by surgeon)   Interdisciplinary Plan of Care Collaboration   IDT Collaboration with  Nursing   Patient Position at End of Therapy In Bed;Bed Alarm On;Call Light within Reach;Tray Table within Reach;Phone within Reach   Collaboration Comments RN updated   Session Information   Date / Session Number  6/13-2 (1/5, 6/19)

## 2024-06-13 NOTE — THERAPY
"Occupational Therapy  Daily Treatment     Patient Name: Salvador Romo  Age:  39 y.o., Sex:  male  Medical Record #: [id number]  Today's Date: 6/13/2024     Precautions: Fall Risk, Spinal / Back Precautions , Cervical Collar    Comments: pre-op order still in place for c-collar AAT    Assessment    Pt now s/p C6-C7 facetectomy with C6-C7 screw fusion. Seen for f/u tx. See grid below for details. Pt limited by pain this session and only able to tolerate minimal activity OOB. BUE intact for AROM/strength/sensation/functional use. Extensive education provided regarding lifting limit, activity restrictions (specific to exercise), management of c-collar. Pt still in hospitals with no updated order for wearing schedule. No NP or PA for surgeon identified. Attempted to contact surgeon via [employer], however he is off-line. Left message requesting clarification of collar use (i.e.: can it be removed for bathing). Pt is limited by pain this session, negatively impacting participation in ADL. Will benefit from ongoing acute OT, but anticipate only 1-2 additional sessions.     Plan    Treatment Plan Status: Modify Current Treatment Plan  Type of Treatment: Self Care / Activities of Daily Living, Adaptive Equipment, Therapeutic Activity, Orthotics Treatment  Treatment Frequency: 3 Times per Week  Treatment Duration: Until Therapy Goals Met    DC Equipment Recommendations: Reacher  Discharge Recommendations: Anticipate that the patient will have no further occupational therapy needs after discharge from the hospital    Subjective    \"The incision really hurts.\"     Objective       06/13/24 1231   Treatment Charges   Charges Yes   OT Self Care / ADL (Units) 2   Total Time Spent   OT Time Spent Yes   OT Self Care / ADL (Minutes) 28   OT Total Time Spent (Calculated) 28    Services   Is patient using  services for this encounter? No   Precautions   Precautions Fall Risk;Spinal / Back Precautions ;Cervical Collar "     Comments pre-op order still in place for c-collar AAT   Vitals   O2 (LPM) 0   O2 Delivery Device None - Room Air   Pain   Pain Scales 0 to 10 Scale    Pain 0 - 10 Group   Location Neck   Location Orientation Posterior   Therapist Pain Assessment During Activity;Nurse Notified  (c/o increased neck pain with all OOB activity; declined up to chair)   Non Verbal Descriptors   Non Verbal Scale  Calm;Unlabored Breathing   Cognition    Cognition / Consciousness WDL   Level of Consciousness Alert   Comments slightly impaired attention   Active ROM Upper Body   Active ROM Upper Body  WDL   Strength Upper Body   Upper Body Strength  WDL   Comments provided minimal proximal resistance due to c-spine prec; strength appears intact   Sensation Upper Body   Upper Extremity Sensation  WDL   Comments denies sensory changes   Upper Body Muscle Tone   Upper Body Muscle Tone  WDL   Other Treatments   Other Treatments Provided Extensive education on c-spine precautions, compensatory activity   Balance   Sitting Balance (Static) Good   Sitting Balance (Dynamic) Fair +   Standing Balance (Static) Fair +   Standing Balance (Dynamic) Fair   Weight Shift Sitting Good   Weight Shift Standing Fair   Skilled Intervention Compensatory Strategies;Verbal Cuing   Comments no AD   Bed Mobility    Supine to Sit Supervised   Sit to Supine Supervised   Scooting Supervised   Rolling Supervised   Skilled Intervention Compensatory Strategies;Postural Facilitation;Verbal Cuing   Comments log roll to/from EOB partially elevated   Activities of Daily Living   Grooming   (declined; educated on compensatory approaches)   Lower Body Dressing   (simulated sock management in tailor sit; pt plans to wear flip-flops at home)   Toileting   (using urinal; declined need)   Skilled Intervention Verbal Cuing   How much help from another person does the patient currently need...   Putting on and taking off regular lower body clothing? 4   Bathing (including washing,  rinsing, and drying)? 3   Toileting, which includes using a toilet, bedpan, or urinal? 4   Putting on and taking off regular upper body clothing? 3   Taking care of personal grooming such as brushing teeth? 3  (due to c-collar)   Eating meals? 4   6 Clicks Daily Activity Score 21   Functional Mobility   Sit to Stand Supervised   Bed, Chair, Wheelchair Transfer Supervised   Transfer Method Stand Step   Skilled Intervention Verbal Cuing   Activity Tolerance   Sitting in Chair declined due to neck pain   Sitting Edge of Bed 5 min   Standing <1 min   Patient / Family Goals   Patient / Family Goal #1 DC to friend's home post-op   Short Term Goals   Short Term Goal # 1 pt will demo dressing ADLs while maintaining spinal precautions without cues post-op c-spine surgery   Goal Outcome # 1 Progressing as expected   Short Term Goal # 2 pt will complete all functional transfers at SPV level   Goal Outcome # 2 Progressing as expected   Short Term Goal # 3 Pt will complete toileting with supv  (6/13 new goal)   Short Term Goal # 4 Pt will demo neutral spine at all times during functional activity  (6/13 new goal)   Education Group   Education Provided Adaptive Equipment;Activities of Daily Living;Brace Wear and Care;Spinal Precautions   Spinal Precautions Patient Response Patient;Acceptance;Explanation;Demonstration;Verbal Demonstration;Action Demonstration;Reinforcement Needed  (neutral spine, lifting restriciton, safe body mechanics)   Brace Wear & Care Patient Response Patient;Acceptance;Explanation;Demonstration;Reinforcement Needed  (doff/don/change pads of c-collar)   ADL Patient Response Patient;Acceptance;Explanation;Verbal Demonstration;Reinforcement Needed  (compensatory approaches to dressing, grooming, bathing)   Adaptive Equipment Patient Response Patient;Acceptance;Explanation;Verbal Demonstration  (use of reacher for object retrieval)   Occupational Therapy Treatment Plan    O.T. Treatment Plan Modify Current  Treatment Plan   Treatment Interventions Self Care / Activities of Daily Living;Adaptive Equipment;Therapeutic Activity;Orthotics Treatment   Treatment Frequency 3 Times per Week   Duration Until Therapy Goals Met   Anticipated Discharge Equipment and Recommendations   DC Equipment Recommendations Reacher   Discharge Recommendations Anticipate that the patient will have no further occupational therapy needs after discharge from the hospital   Interdisciplinary Plan of Care Collaboration   IDT Collaboration with  Nursing   Patient Position at End of Therapy In Bed;Call Light within Reach;Tray Table within Reach;Phone within Reach;Family / Friend in Room   Collaboration Comments OT results and recs   Session Information   Date / Session Number  6/13 #2 (1/3, 6/19)

## 2024-06-13 NOTE — PROGRESS NOTES
Trauma / Surgical Daily Progress Note    Date of Service  6/13/2024    Chief Complaint  39 y.o. male admitted 6/5/2024 with cervical neck injury after jumping off a 10 ft wall. History of poly-substance abuse and above knee DVT.     POD #1 left cervical 6/7 facetectomy.    Interval Events  No critical events overnight.  Appreciate reevaluation by physical therapy.  Notes reviewed.  Hemovac remains in place.    -Aggressive pulmonary hygiene.  -Mobilize out of bed and into chair.  Ambulate in the hallway.  - Plan to transition to oral anticoagulation when Hemovac has been removed.    Nearing discharge.    Review of Systems  Review of Systems   Constitutional:  Negative for chills, fever and malaise/fatigue.   Musculoskeletal:  Positive for myalgias and neck pain.   Neurological:  Positive for weakness. Negative for dizziness.   Psychiatric/Behavioral:  The patient is nervous/anxious.    All other systems reviewed and are negative.       Vital Signs  Temp:  [36.4 °C (97.5 °F)-36.8 °C (98.2 °F)] 36.7 °C (98.1 °F)  Pulse:  [73-99] 73  Resp:  [14-16] 14  BP: (109-137)/(79-93) 115/79  SpO2:  [91 %-98 %] 93 %    Physical Exam  Physical Exam  Vitals and nursing note reviewed.   Constitutional:       General: He is awake.      Appearance: Normal appearance.      Interventions: Cervical collar in place.   HENT:      Mouth/Throat:      Mouth: Mucous membranes are moist.   Eyes:      Pupils: Pupils are equal, round, and reactive to light.   Cardiovascular:      Rate and Rhythm: Normal rate.   Pulmonary:      Effort: No respiratory distress.   Chest:      Chest wall: No deformity, swelling, tenderness or crepitus.   Abdominal:      General: There is no distension.      Palpations: Abdomen is soft.   Genitourinary:     Comments: Voiding.   Musculoskeletal:         General: Tenderness present. Normal range of motion.      Cervical back: Neck supple. Spinous process tenderness present.      Comments: 5/5 upper and lower extremity  strength bilaterally.    Skin:     General: Skin is warm and dry.      Capillary Refill: Capillary refill takes less than 2 seconds.      Findings: Abrasion present.      Comments: Superficial abrasions to bilateral anterior feet.    Neurological:      Mental Status: He is alert and oriented to person, place, and time. Mental status is at baseline.   Psychiatric:         Mood and Affect: Mood normal.         Laboratory  Recent Results (from the past 24 hour(s))   CBC with Differential: Tomorrow AM    Collection Time: 06/13/24  4:40 AM   Result Value Ref Range    WBC 11.0 (H) 4.8 - 10.8 K/uL    RBC 4.26 (L) 4.70 - 6.10 M/uL    Hemoglobin 13.9 (L) 14.0 - 18.0 g/dL    Hematocrit 40.2 (L) 42.0 - 52.0 %    MCV 94.4 81.4 - 97.8 fL    MCH 32.6 27.0 - 33.0 pg    MCHC 34.6 32.3 - 36.5 g/dL    RDW 46.0 35.9 - 50.0 fL    Platelet Count 310 164 - 446 K/uL    MPV 9.4 9.0 - 12.9 fL    Neutrophils-Polys 64.00 44.00 - 72.00 %    Lymphocytes 19.80 (L) 22.00 - 41.00 %    Monocytes 13.90 (H) 0.00 - 13.40 %    Eosinophils 1.20 0.00 - 6.90 %    Basophils 0.40 0.00 - 1.80 %    Immature Granulocytes 0.70 0.00 - 0.90 %    Nucleated RBC 0.00 0.00 - 0.20 /100 WBC    Neutrophils (Absolute) 7.07 1.82 - 7.42 K/uL    Lymphs (Absolute) 2.19 1.00 - 4.80 K/uL    Monos (Absolute) 1.53 (H) 0.00 - 0.85 K/uL    Eos (Absolute) 0.13 0.00 - 0.51 K/uL    Baso (Absolute) 0.04 0.00 - 0.12 K/uL    Immature Granulocytes (abs) 0.08 0.00 - 0.11 K/uL    NRBC (Absolute) 0.00 K/uL   Comp Metabolic Panel (CMP): Tomorrow AM    Collection Time: 06/13/24  4:40 AM   Result Value Ref Range    Sodium 139 135 - 145 mmol/L    Potassium 3.8 3.6 - 5.5 mmol/L    Chloride 101 96 - 112 mmol/L    Co2 23 20 - 33 mmol/L    Anion Gap 15.0 7.0 - 16.0    Glucose 113 (H) 65 - 99 mg/dL    Bun 16 8 - 22 mg/dL    Creatinine 0.64 0.50 - 1.40 mg/dL    Calcium 9.1 8.5 - 10.5 mg/dL    Correct Calcium 8.9 8.5 - 10.5 mg/dL    AST(SGOT) 34 12 - 45 U/L    ALT(SGPT) 79 (H) 2 - 50 U/L    Alkaline  Phosphatase 128 (H) 30 - 99 U/L    Total Bilirubin 0.2 0.1 - 1.5 mg/dL    Albumin 4.2 3.2 - 4.9 g/dL    Total Protein 7.6 6.0 - 8.2 g/dL    Globulin 3.4 1.9 - 3.5 g/dL    A-G Ratio 1.2 g/dL   Phosphorus: Every Monday and Thursday AM    Collection Time: 06/13/24  4:40 AM   Result Value Ref Range    Phosphorus 3.1 2.5 - 4.5 mg/dL   MAGNESIUM    Collection Time: 06/13/24  4:40 AM   Result Value Ref Range    Magnesium 2.3 1.5 - 2.5 mg/dL   ESTIMATED GFR    Collection Time: 06/13/24  4:40 AM   Result Value Ref Range    GFR (CKD-EPI) 123 >60 mL/min/1.73 m 2       Fluids    Intake/Output Summary (Last 24 hours) at 6/13/2024 1644  Last data filed at 6/13/2024 1230  Gross per 24 hour   Intake 46.9 ml   Output 280 ml   Net -233.1 ml       Core Measures & Quality Metrics  Core Measures & Quality Metrics  RAP Score Total: 4    CAGE Results: positive Blood Alcohol>0.08: no CAGE Score: 0  Total: POSITIVE  Assessment complete date: 6/5/2024  Intervention: Complete. Patient response to intervention: patient rreports he uses substances recreationally, declines intervention..   Patient demonstrates understanding of intervention. Patient does not agree to follow-up.   has not been contacted. Total ETOH intervention time: 15 - 30 mintues      Assessment/Plan  * Trauma- (present on admission)  Assessment & Plan  Jumped off an embankment ~ 10 ft height.  Trauma Green Activation.  Cosmo Ruiz MD. Trauma Surgery.     Acute deep vein thrombosis (DVT) of left lower extremity (HCC)- (present on admission)  Assessment & Plan  Subacute and chronic left lower extremity DVT noted on LE Duplex US (6/6).  Patient has a known history of DVT and has completed a 6mo course of Xarelto.   6/6 Heparin gtt initiated and okayed by neurosurgery.  6/12 heparin stopped for surgery.  Dr. Briscoe would like drip to remain off until removal of Hemovac.  Anti-Xa protocol.       Closed fracture of seventh cervical vertebra (HCC)- (present on  admission)  Assessment & Plan  Left C7 superior facet fracture with jumped facet at C6/C7.  2.7 mm anterolisthesis C6 on C7  CTA within normal limits.  6/12 Left cervical 6/7 facetectomy. Hemovac in place.  Cervical immobilization. Ok to mobilize with collar securely on.    Mitch Leon MD, PhD. Neurosurgeon. [facility].     Tobacco use- (present on admission)  Assessment & Plan  6/9 Nicotine replacement.   Cessation education.    Fracture three ribs-closed, right, initial encounter- (present on admission)  Assessment & Plan  subtle anterior right fourth through sixth rib fractures.  Aggressive multimodal pain management, and pulmonary hygiene. Serial chest radiographs.     No contraindication to deep vein thrombosis (DVT) prophylaxis- (present on admission)  Assessment & Plan  VTE prophylaxis initially contraindicated secondary to elevated bleeding risk.  6/6 Left lower extremity DVT identified. Heparin gtt initiated.   6/12 heparin stopped for surgery.  Dr. Briscoe would like drip to remain off until removal of Hemovac.    Laceration of head- (present on admission)  Assessment & Plan  Stapled in ED.  Routine remove in 7 days         Discussed patient condition with RN, Patient, and trauma surgery, Dr. Ruiz.

## 2024-06-13 NOTE — CARE PLAN
Problem: Pain - Standard  Goal: Alleviation of pain or a reduction in pain to the patient’s comfort goal  Outcome: Progressing     Problem: Knowledge Deficit - Standard  Goal: Patient and family/care givers will demonstrate understanding of plan of care, disease process/condition, diagnostic tests and medications  Outcome: Progressing     Problem: Skin Integrity  Goal: Skin integrity is maintained or improved  Outcome: Progressing     Problem: Fall Risk  Goal: Patient will remain free from falls  Outcome: Progressing     Problem: Neuro Status  Goal: Neuro status will remain stable or improve  Outcome: Progressing     Problem: Respiratory  Goal: Patient will achieve/maintain optimum respiratory ventilation and gas exchange  Outcome: Progressing     Problem: Infection - Standard  Goal: Patient will remain free from infection  Outcome: Progressing   The patient is Stable - Low risk of patient condition declining or worsening    Shift Goals  Clinical Goals: New morphine back, ambulate, safety  Patient Goals: move bed out of sun, ambulation  Family Goals: Not present    Progress made toward(s) clinical / shift goals:      Patient is not progressing towards the following goals:

## 2024-06-13 NOTE — CARE PLAN
The patient is Stable - Low risk of patient condition declining or worsening    Shift Goals  Clinical Goals: PCA management; pain management; VSS  Patient Goals: pain control  Family Goals: comfort    Progress made toward(s) clinical / shift goals:    Problem: Pain - Standard  Goal: Alleviation of pain or a reduction in pain to the patient’s comfort goal  Outcome: Progressing     Problem: Knowledge Deficit - Standard  Goal: Patient and family/care givers will demonstrate understanding of plan of care, disease process/condition, diagnostic tests and medications  Outcome: Progressing     Problem: Skin Integrity  Goal: Skin integrity is maintained or improved  Outcome: Progressing     Problem: Fall Risk  Goal: Patient will remain free from falls  Outcome: Progressing       Patient is not progressing towards the following goals:

## 2024-06-14 LAB
ALBUMIN SERPL BCP-MCNC: 3.9 G/DL (ref 3.2–4.9)
ALBUMIN/GLOB SERPL: 1.3 G/DL
ALP SERPL-CCNC: 167 U/L (ref 30–99)
ALT SERPL-CCNC: 73 U/L (ref 2–50)
ANION GAP SERPL CALC-SCNC: 13 MMOL/L (ref 7–16)
AST SERPL-CCNC: 34 U/L (ref 12–45)
BASOPHILS # BLD AUTO: 0.5 % (ref 0–1.8)
BASOPHILS # BLD: 0.04 K/UL (ref 0–0.12)
BILIRUB SERPL-MCNC: 0.3 MG/DL (ref 0.1–1.5)
BUN SERPL-MCNC: 13 MG/DL (ref 8–22)
CALCIUM ALBUM COR SERPL-MCNC: 9.4 MG/DL (ref 8.5–10.5)
CALCIUM SERPL-MCNC: 9.3 MG/DL (ref 8.5–10.5)
CHLORIDE SERPL-SCNC: 102 MMOL/L (ref 96–112)
CO2 SERPL-SCNC: 23 MMOL/L (ref 20–33)
CREAT SERPL-MCNC: 0.59 MG/DL (ref 0.5–1.4)
EOSINOPHIL # BLD AUTO: 0.31 K/UL (ref 0–0.51)
EOSINOPHIL NFR BLD: 3.7 % (ref 0–6.9)
ERYTHROCYTE [DISTWIDTH] IN BLOOD BY AUTOMATED COUNT: 44.2 FL (ref 35.9–50)
GFR SERPLBLD CREATININE-BSD FMLA CKD-EPI: 126 ML/MIN/1.73 M 2
GLOBULIN SER CALC-MCNC: 3.1 G/DL (ref 1.9–3.5)
GLUCOSE SERPL-MCNC: 104 MG/DL (ref 65–99)
HCT VFR BLD AUTO: 40.2 % (ref 42–52)
HGB BLD-MCNC: 13.8 G/DL (ref 14–18)
IMM GRANULOCYTES # BLD AUTO: 0.07 K/UL (ref 0–0.11)
IMM GRANULOCYTES NFR BLD AUTO: 0.8 % (ref 0–0.9)
LYMPHOCYTES # BLD AUTO: 1.92 K/UL (ref 1–4.8)
LYMPHOCYTES NFR BLD: 22.9 % (ref 22–41)
MCH RBC QN AUTO: 32.2 PG (ref 27–33)
MCHC RBC AUTO-ENTMCNC: 34.3 G/DL (ref 32.3–36.5)
MCV RBC AUTO: 93.9 FL (ref 81.4–97.8)
MONOCYTES # BLD AUTO: 0.89 K/UL (ref 0–0.85)
MONOCYTES NFR BLD AUTO: 10.6 % (ref 0–13.4)
NEUTROPHILS # BLD AUTO: 5.16 K/UL (ref 1.82–7.42)
NEUTROPHILS NFR BLD: 61.5 % (ref 44–72)
NRBC # BLD AUTO: 0 K/UL
NRBC BLD-RTO: 0 /100 WBC (ref 0–0.2)
PLATELET # BLD AUTO: 322 K/UL (ref 164–446)
PMV BLD AUTO: 9.4 FL (ref 9–12.9)
POTASSIUM SERPL-SCNC: 4.1 MMOL/L (ref 3.6–5.5)
PROT SERPL-MCNC: 7 G/DL (ref 6–8.2)
RBC # BLD AUTO: 4.28 M/UL (ref 4.7–6.1)
SODIUM SERPL-SCNC: 138 MMOL/L (ref 135–145)
WBC # BLD AUTO: 8.4 K/UL (ref 4.8–10.8)

## 2024-06-14 PROCEDURE — 80053 COMPREHEN METABOLIC PANEL: CPT

## 2024-06-14 PROCEDURE — A9270 NON-COVERED ITEM OR SERVICE: HCPCS | Performed by: REGISTERED NURSE

## 2024-06-14 PROCEDURE — 36415 COLL VENOUS BLD VENIPUNCTURE: CPT

## 2024-06-14 PROCEDURE — A9270 NON-COVERED ITEM OR SERVICE: HCPCS | Performed by: NURSE PRACTITIONER

## 2024-06-14 PROCEDURE — 85025 COMPLETE CBC W/AUTO DIFF WBC: CPT

## 2024-06-14 PROCEDURE — 700102 HCHG RX REV CODE 250 W/ 637 OVERRIDE(OP): Performed by: REGISTERED NURSE

## 2024-06-14 PROCEDURE — 97535 SELF CARE MNGMENT TRAINING: CPT

## 2024-06-14 PROCEDURE — 700102 HCHG RX REV CODE 250 W/ 637 OVERRIDE(OP)

## 2024-06-14 PROCEDURE — 700105 HCHG RX REV CODE 258: Performed by: NEUROLOGICAL SURGERY

## 2024-06-14 PROCEDURE — 700102 HCHG RX REV CODE 250 W/ 637 OVERRIDE(OP): Performed by: NURSE PRACTITIONER

## 2024-06-14 PROCEDURE — A9270 NON-COVERED ITEM OR SERVICE: HCPCS

## 2024-06-14 PROCEDURE — 99232 SBSQ HOSP IP/OBS MODERATE 35: CPT

## 2024-06-14 PROCEDURE — 700102 HCHG RX REV CODE 250 W/ 637 OVERRIDE(OP): Performed by: NEUROLOGICAL SURGERY

## 2024-06-14 PROCEDURE — 700111 HCHG RX REV CODE 636 W/ 250 OVERRIDE (IP): Performed by: NEUROLOGICAL SURGERY

## 2024-06-14 PROCEDURE — 770001 HCHG ROOM/CARE - MED/SURG/GYN PRIV*

## 2024-06-14 PROCEDURE — A9270 NON-COVERED ITEM OR SERVICE: HCPCS | Performed by: NEUROLOGICAL SURGERY

## 2024-06-14 RX ORDER — OXYCODONE HYDROCHLORIDE 10 MG/1
10 TABLET ORAL
Status: DISCONTINUED | OUTPATIENT
Start: 2024-06-14 | End: 2024-06-14

## 2024-06-14 RX ORDER — OXYCODONE HYDROCHLORIDE AND ACETAMINOPHEN 5; 325 MG/1; MG/1
2 TABLET ORAL EVERY 4 HOURS PRN
Status: DISCONTINUED | OUTPATIENT
Start: 2024-06-14 | End: 2024-06-15

## 2024-06-14 RX ORDER — GUAIFENESIN 600 MG/1
600 TABLET, EXTENDED RELEASE ORAL EVERY 12 HOURS
Status: DISCONTINUED | OUTPATIENT
Start: 2024-06-14 | End: 2024-06-15 | Stop reason: HOSPADM

## 2024-06-14 RX ORDER — OXYCODONE HYDROCHLORIDE AND ACETAMINOPHEN 5; 325 MG/1; MG/1
1 TABLET ORAL EVERY 4 HOURS PRN
Status: DISCONTINUED | OUTPATIENT
Start: 2024-06-14 | End: 2024-06-15

## 2024-06-14 RX ORDER — OXYCODONE HYDROCHLORIDE 10 MG/1
10 TABLET ORAL EVERY 4 HOURS PRN
Status: DISCONTINUED | OUTPATIENT
Start: 2024-06-14 | End: 2024-06-14

## 2024-06-14 RX ORDER — OXYCODONE HYDROCHLORIDE 5 MG/1
5 TABLET ORAL EVERY 4 HOURS PRN
Status: DISCONTINUED | OUTPATIENT
Start: 2024-06-14 | End: 2024-06-14

## 2024-06-14 RX ORDER — BUTALBITAL, ACETAMINOPHEN AND CAFFEINE 50; 325; 40 MG/1; MG/1; MG/1
1 TABLET ORAL EVERY 6 HOURS PRN
Status: DISCONTINUED | OUTPATIENT
Start: 2024-06-14 | End: 2024-06-15

## 2024-06-14 RX ORDER — OXYCODONE HYDROCHLORIDE 5 MG/1
5 TABLET ORAL
Status: DISCONTINUED | OUTPATIENT
Start: 2024-06-14 | End: 2024-06-14

## 2024-06-14 RX ADMIN — GUAIFENESIN 600 MG: 600 TABLET, EXTENDED RELEASE ORAL at 19:59

## 2024-06-14 RX ADMIN — GABAPENTIN 300 MG: 300 CAPSULE ORAL at 04:52

## 2024-06-14 RX ADMIN — SERTRALINE HYDROCHLORIDE 25 MG: 50 TABLET ORAL at 04:50

## 2024-06-14 RX ADMIN — METAXALONE 800 MG: 800 TABLET ORAL at 04:47

## 2024-06-14 RX ADMIN — METAXALONE 800 MG: 800 TABLET ORAL at 16:30

## 2024-06-14 RX ADMIN — OXYCODONE AND ACETAMINOPHEN 2 TABLET: 5; 325 TABLET ORAL at 19:58

## 2024-06-14 RX ADMIN — ACETAMINOPHEN 1000 MG: 500 TABLET, FILM COATED ORAL at 11:21

## 2024-06-14 RX ADMIN — CEFAZOLIN 2 G: 2 INJECTION, POWDER, FOR SOLUTION INTRAMUSCULAR; INTRAVENOUS at 14:52

## 2024-06-14 RX ADMIN — DOCUSATE SODIUM 100 MG: 100 CAPSULE, LIQUID FILLED ORAL at 04:50

## 2024-06-14 RX ADMIN — SENNOSIDES AND DOCUSATE SODIUM 1 TABLET: 50; 8.6 TABLET ORAL at 19:59

## 2024-06-14 RX ADMIN — OXYCODONE HYDROCHLORIDE 10 MG: 10 TABLET ORAL at 12:18

## 2024-06-14 RX ADMIN — OXYCODONE AND ACETAMINOPHEN 2 TABLET: 5; 325 TABLET ORAL at 23:57

## 2024-06-14 RX ADMIN — POLYETHYLENE GLYCOL 3350 1 PACKET: 17 POWDER, FOR SOLUTION ORAL at 16:30

## 2024-06-14 RX ADMIN — CEFAZOLIN 2 G: 2 INJECTION, POWDER, FOR SOLUTION INTRAMUSCULAR; INTRAVENOUS at 04:56

## 2024-06-14 RX ADMIN — ACETAMINOPHEN 1000 MG: 500 TABLET, FILM COATED ORAL at 04:52

## 2024-06-14 RX ADMIN — TRAZODONE HYDROCHLORIDE 50 MG: 50 TABLET ORAL at 19:59

## 2024-06-14 RX ADMIN — CEFAZOLIN 2 G: 2 INJECTION, POWDER, FOR SOLUTION INTRAMUSCULAR; INTRAVENOUS at 20:05

## 2024-06-14 RX ADMIN — GABAPENTIN 300 MG: 300 CAPSULE ORAL at 14:49

## 2024-06-14 RX ADMIN — GUAIFENESIN 600 MG: 600 TABLET, EXTENDED RELEASE ORAL at 11:21

## 2024-06-14 RX ADMIN — METAXALONE 800 MG: 800 TABLET ORAL at 11:21

## 2024-06-14 RX ADMIN — OXYCODONE AND ACETAMINOPHEN 2 TABLET: 5; 325 TABLET ORAL at 16:30

## 2024-06-14 RX ADMIN — Medication 1 LOZENGE: at 22:06

## 2024-06-14 RX ADMIN — DOCUSATE SODIUM 100 MG: 100 CAPSULE, LIQUID FILLED ORAL at 16:30

## 2024-06-14 RX ADMIN — GABAPENTIN 300 MG: 300 CAPSULE ORAL at 20:01

## 2024-06-14 RX ADMIN — BACITRACIN ZINC: 500 OINTMENT TOPICAL at 04:47

## 2024-06-14 RX ADMIN — Medication 1 APPLICATOR: at 04:54

## 2024-06-14 ASSESSMENT — COGNITIVE AND FUNCTIONAL STATUS - GENERAL
PERSONAL GROOMING: A LITTLE
DAILY ACTIVITIY SCORE: 21
HELP NEEDED FOR BATHING: A LITTLE
SUGGESTED CMS G CODE MODIFIER DAILY ACTIVITY: CJ
DRESSING REGULAR UPPER BODY CLOTHING: A LITTLE

## 2024-06-14 ASSESSMENT — ENCOUNTER SYMPTOMS
CHILLS: 0
NECK PAIN: 1
WEAKNESS: 1
DIZZINESS: 0
MYALGIAS: 1
FEVER: 0

## 2024-06-14 ASSESSMENT — PAIN DESCRIPTION - PAIN TYPE
TYPE: SURGICAL PAIN
TYPE: ACUTE PAIN;SURGICAL PAIN

## 2024-06-14 ASSESSMENT — PATIENT HEALTH QUESTIONNAIRE - PHQ9
SUM OF ALL RESPONSES TO PHQ9 QUESTIONS 1 AND 2: 0
2. FEELING DOWN, DEPRESSED, IRRITABLE, OR HOPELESS: NOT AT ALL
1. LITTLE INTEREST OR PLEASURE IN DOING THINGS: NOT AT ALL

## 2024-06-14 ASSESSMENT — GAIT ASSESSMENTS: DISTANCE (FEET): 50

## 2024-06-14 NOTE — PROGRESS NOTES
Neurosurgery Progress Note    Subjective:  No acute events overnight  Surgical reduction/stabilization left C6/7 jumped facet yesterday.    Exam:  AAO, collar on, cooperative,   trace left tricep weakness  Sensation intact touch 4 extremities  Hemovac: 160 cc    BP  Min: 106/75  Max: 137/86  Pulse  Av.7  Min: 69  Max: 93  Resp  Avg: 15.7  Min: 14  Max: 16  Temp  Av.7 °C (98 °F)  Min: 36.4 °C (97.5 °F)  Max: 36.8 °C (98.2 °F)  SpO2  Av.2 %  Min: 91 %  Max: 95 %    No data recorded    Recent Labs     24  0730 24  0646 24  0440   WBC 10.3 11.5* 11.0*   RBC 4.61* 4.60* 4.26*   HEMOGLOBIN 14.8 15.0 13.9*   HEMATOCRIT 43.2 43.1 40.2*   MCV 93.7 93.7 94.4   MCH 32.1 32.6 32.6   MCHC 34.3 34.8 34.6   RDW 46.7 45.9 46.0   PLATELETCT 277 284 310   MPV 9.3 9.3 9.4     Recent Labs     24  0730 24  0646 24  0440   SODIUM 132* 137 139   POTASSIUM 4.4 4.2 3.8   CHLORIDE 95* 99 101   CO2 23 22 23   GLUCOSE 114* 109* 113*   BUN 11 15 16   CREATININE 0.75 0.78 0.64   CALCIUM 8.7 9.5 9.1                 Intake/Output                         24 - 2459 24 - 2459      Total  Total                 Intake    I.V.  1050  12 1062  34.9  82.5 117.4    PCA End of Shift Total Volume (ml) -- 12 12 34.9 82.5 117.4    Volume (mL) (Lactated Ringers) 1000 -- 1000 -- -- --    Volume (mL) (electrolyte-A (Plasmalyte-A) infusion) 50 -- 50 -- -- --    Total Intake 1050 12 1062 34.9 82.5 117.4       Output    Drains  10  120 130  160  150 310    Output (mL) (Closed/Suction Drain 1 Posterior Neck Hemovac) 10 120 130 160 150 310    Blood  50  -- 50  --  -- --    Est. Blood Loss 50 -- 50 -- -- --    Total Output 60 120 180 160 150 310       Net I/O     990 -108 882 -125.1 -67.5 -192.6              Intake/Output Summary (Last 24 hours) at 2024  Last data filed at 2024  Gross per 24 hour   Intake 117.4 ml   Output  355 ml   Net -237.6 ml             Pharmacy Consult Request  1 Each PHARMACY TO DOSE    acetaminophen  1,000 mg Q6HRS    Followed by    [START ON 6/17/2024] acetaminophen  1,000 mg Q6HRS PRN    morphine   Continuous    ceFAZolin  2 g Q8HRS    carboxymethylcellulose  1 Drop PRN    menthol  1 Lozenge Q2HRS PRN    nicotine  14 mg Daily-0600    And    nicotine polacrilex  2 mg Q HOUR PRN    bacitracin   DAILY    sertraline  25 mg DAILY    cloNIDine  0.1 mg TID PRN    gabapentin  300 mg Q8HRS    traZODone  50 mg QHS    heparin  0-30 Units/kg/hr Continuous    heparin  40 Units/kg PRN    Respiratory Therapy Consult   Continuous RT    ondansetron  4 mg Q4HRS PRN    ondansetron  4 mg Q4HRS PRN    docusate sodium  100 mg BID    senna-docusate  1 Tablet Nightly    senna-docusate  1 Tablet Q24HRS PRN    polyethylene glycol/lytes  1 Packet BID    magnesium hydroxide  30 mL DAILY AT 1800    bisacodyl  10 mg Q24HRS PRN    sodium phosphate  1 Each Once PRN    metaxalone  800 mg TID       Assessment and Plan:  POD #`1 open reduction left C6/7 jumped facet, lateral mass fusion bilateral C6/7Prophylactic anticoagulation: no         Start date/time: 24 hours after drain has been removed     Brain Compression: No  Ok to ambulate  Cervical collar on all times  Continue Hemovac

## 2024-06-14 NOTE — CARE PLAN
Problem: Pain - Standard  Goal: Alleviation of pain or a reduction in pain to the patient’s comfort goal  Outcome: Progressing     Problem: Knowledge Deficit - Standard  Goal: Patient and family/care givers will demonstrate understanding of plan of care, disease process/condition, diagnostic tests and medications  Outcome: Progressing     Problem: Neuro Status  Goal: Neuro status will remain stable or improve  Outcome: Progressing       The patient is Stable - Low risk of patient condition declining or worsening    Shift Goals  Clinical Goals: Pain control, safety, neuro checks  Patient Goals: Pain control  Family Goals: Not present    Progress made toward(s) clinical / shift goals:  Taught pt 0-10 pain scale and non-pharmacological method of pain management, encouraged to verbalize when in pain. Administered PRN pain meds as needed. Bed locked and in lowest position. Bed alarm on. Safety and fall precautions in place. Hourly rounding. Call light and belongings within reach. Neuro checks per orders.     Patient is not progressing towards the following goals:

## 2024-06-14 NOTE — DISCHARGE PLANNING
Case Management Discharge Planning    Admission Date: 6/5/2024  GMLOS: 3.2  ALOS: 9    6-Clicks ADL Score: 21  6-Clicks Mobility Score: 20      Anticipated Discharge Dispo: Discharge Disposition: Discharged to home/self care (01)  Discharge Address: [address] [place] [id number]  Discharge Contact Phone Number: [phone]    DME Needed: No    Action(s) Taken: Physical therapy recommending outpatient PT upon DC from the hospital. No anticipated needs from case management at this time.  RN CM to continue to follow for any needs that arise.  Pt pending medical clearance.      Escalations Completed: None    Medically Clear: No    Next Steps: RN CM to continue to follow for DC planning      Barriers to Discharge: Medical clearance

## 2024-06-14 NOTE — PROGRESS NOTES
Neurosurgery Progress Note    Subjective:  No acute events overnight  Surgical reduction/stabilization left C6/7 jumped facet .    Exam:  AAO, collar on, cooperative,   left tricep 4+/5  Sensation intact touch 4 extremities  Dressing intact  Hemovac: 160 cc clear    BP  Min: 106/75  Max: 128/83  Pulse  Av.6  Min: 62  Max: 76  Resp  Avg: 15.4  Min: 14  Max: 16  Temp  Av.7 °C (98 °F)  Min: 36.5 °C (97.7 °F)  Max: 36.7 °C (98.1 °F)  SpO2  Av.8 %  Min: 92 %  Max: 95 %    No data recorded    Recent Labs     24  0646 24  0440   WBC 11.5* 11.0*   RBC 4.60* 4.26*   HEMOGLOBIN 15.0 13.9*   HEMATOCRIT 43.1 40.2*   MCV 93.7 94.4   MCH 32.6 32.6   MCHC 34.8 34.6   RDW 45.9 46.0   PLATELETCT 284 310   MPV 9.3 9.4     Recent Labs     24  0646 24  0440   SODIUM 137 139   POTASSIUM 4.2 3.8   CHLORIDE 99 101   CO2 22 23   GLUCOSE 109* 113*   BUN 15 16   CREATININE 0.78 0.64   CALCIUM 9.5 9.1                 Intake/Output                         24 0700 - 2459 24 07 - 06/15/24 0659      Total  Total                 Intake    P.O.  --  240 240  --  -- --    P.O. -- 240 240 -- -- --    I.V.  34.9  82.5 117.4  --  -- --    PCA End of Shift Total Volume (ml) 34.9 82.5 117.4 -- -- --    Total Intake 34.9 322.5 357.4 -- -- --       Output    Drains  160  310 470  --  -- --    Output (mL) (Closed/Suction Drain 1 Posterior Neck Hemovac) 160 310 470 -- -- --    Total Output 160 310 470 -- -- --       Net I/O     -125.1 12.5 -112.6 -- -- --              Intake/Output Summary (Last 24 hours) at 2024 0925  Last data filed at 2024 0431  Gross per 24 hour   Intake 322.5 ml   Output 470 ml   Net -147.5 ml             Nozin nasal  swab  1 Applicator BID    Pharmacy Consult Request  1 Each PHARMACY TO DOSE    acetaminophen  1,000 mg Q6HRS    Followed by    [START ON 2024] acetaminophen  1,000 mg Q6HRS PRN    morphine    Continuous    ceFAZolin  2 g Q8HRS    carboxymethylcellulose  1 Drop PRN    menthol  1 Lozenge Q2HRS PRN    nicotine  14 mg Daily-0600    And    nicotine polacrilex  2 mg Q HOUR PRN    bacitracin   DAILY    sertraline  25 mg DAILY    cloNIDine  0.1 mg TID PRN    gabapentin  300 mg Q8HRS    traZODone  50 mg QHS    heparin  0-30 Units/kg/hr Continuous    heparin  40 Units/kg PRN    Respiratory Therapy Consult   Continuous RT    ondansetron  4 mg Q4HRS PRN    ondansetron  4 mg Q4HRS PRN    docusate sodium  100 mg BID    senna-docusate  1 Tablet Nightly    senna-docusate  1 Tablet Q24HRS PRN    polyethylene glycol/lytes  1 Packet BID    magnesium hydroxide  30 mL DAILY AT 1800    bisacodyl  10 mg Q24HRS PRN    sodium phosphate  1 Each Once PRN    metaxalone  800 mg TID       Assessment and Plan:  POD #`2 open reduction left C6/7 jumped facet, lateral mass fusion bilateral C6/7Prophylactic anticoagulation: no         Start date/time: 24 hours after drain has been removed     Brain Compression: No  Ok to ambulate  Cervical collar on all times  D/c Hemovac today  Ambulate with assistance  RTC 2 weeks staple removal

## 2024-06-14 NOTE — PROGRESS NOTES
Trauma / Surgical Daily Progress Note    Date of Service  6/14/2024    Chief Complaint  39 y.o. male admitted 6/5/2024 with cervical neck injury after jumping off a 10 ft wall. History of poly-substance abuse and above knee DVT.      POD #2 left cervical 6/7 facetectomy    Interval Events  No critical events overnight.  Reporting muscle spasms. PCA has been discontinued.   Hemovac has been discontinued.  Okay to resume prophylaxis anticoagulation 24 hours after drain has been removed.    -P.o. pain meds initiated.  -Cleared by therapies.    Plan to discharge tomorrow morning.    Review of Systems  Review of Systems   Constitutional:  Negative for chills, fever and malaise/fatigue.   Musculoskeletal:  Positive for myalgias and neck pain.        Reporting muscle spasms   Neurological:  Positive for weakness. Negative for dizziness.   All other systems reviewed and are negative.       Vital Signs  Temp:  [36.5 °C (97.7 °F)-36.7 °C (98.1 °F)] 36.7 °C (98.1 °F)  Pulse:  [62-73] 62  Resp:  [14-16] 15  BP: (106-122)/(57-79) 122/66  SpO2:  [92 %-95 %] 94 %    Physical Exam  Physical Exam  Vitals and nursing note reviewed.   Constitutional:       General: He is awake.      Appearance: Normal appearance.      Interventions: Cervical collar in place.   HENT:      Mouth/Throat:      Mouth: Mucous membranes are moist.   Eyes:      Pupils: Pupils are equal, round, and reactive to light.   Cardiovascular:      Rate and Rhythm: Normal rate.   Pulmonary:      Effort: No respiratory distress.   Chest:      Chest wall: No deformity, swelling, tenderness or crepitus.   Abdominal:      General: There is no distension.      Palpations: Abdomen is soft.   Genitourinary:     Comments: Voiding.   Musculoskeletal:         General: Tenderness present. Normal range of motion.      Cervical back: Neck supple. Spinous process tenderness present.      Comments: 5/5 upper and lower extremity strength bilaterally.    Skin:     General: Skin is  warm and dry.      Capillary Refill: Capillary refill takes less than 2 seconds.      Findings: Abrasion present.      Comments: Superficial abrasions to bilateral anterior feet.    Neurological:      Mental Status: He is alert and oriented to person, place, and time. Mental status is at baseline.   Psychiatric:         Mood and Affect: Mood normal.         Laboratory  Recent Results (from the past 24 hour(s))   CBC with Differential: Tomorrow AM    Collection Time: 06/14/24  8:49 AM   Result Value Ref Range    WBC 8.4 4.8 - 10.8 K/uL    RBC 4.28 (L) 4.70 - 6.10 M/uL    Hemoglobin 13.8 (L) 14.0 - 18.0 g/dL    Hematocrit 40.2 (L) 42.0 - 52.0 %    MCV 93.9 81.4 - 97.8 fL    MCH 32.2 27.0 - 33.0 pg    MCHC 34.3 32.3 - 36.5 g/dL    RDW 44.2 35.9 - 50.0 fL    Platelet Count 322 164 - 446 K/uL    MPV 9.4 9.0 - 12.9 fL    Neutrophils-Polys 61.50 44.00 - 72.00 %    Lymphocytes 22.90 22.00 - 41.00 %    Monocytes 10.60 0.00 - 13.40 %    Eosinophils 3.70 0.00 - 6.90 %    Basophils 0.50 0.00 - 1.80 %    Immature Granulocytes 0.80 0.00 - 0.90 %    Nucleated RBC 0.00 0.00 - 0.20 /100 WBC    Neutrophils (Absolute) 5.16 1.82 - 7.42 K/uL    Lymphs (Absolute) 1.92 1.00 - 4.80 K/uL    Monos (Absolute) 0.89 (H) 0.00 - 0.85 K/uL    Eos (Absolute) 0.31 0.00 - 0.51 K/uL    Baso (Absolute) 0.04 0.00 - 0.12 K/uL    Immature Granulocytes (abs) 0.07 0.00 - 0.11 K/uL    NRBC (Absolute) 0.00 K/uL   Comp Metabolic Panel (CMP): Tomorrow AM    Collection Time: 06/14/24  8:49 AM   Result Value Ref Range    Sodium 138 135 - 145 mmol/L    Potassium 4.1 3.6 - 5.5 mmol/L    Chloride 102 96 - 112 mmol/L    Co2 23 20 - 33 mmol/L    Anion Gap 13.0 7.0 - 16.0    Glucose 104 (H) 65 - 99 mg/dL    Bun 13 8 - 22 mg/dL    Creatinine 0.59 0.50 - 1.40 mg/dL    Calcium 9.3 8.5 - 10.5 mg/dL    Correct Calcium 9.4 8.5 - 10.5 mg/dL    AST(SGOT) 34 12 - 45 U/L    ALT(SGPT) 73 (H) 2 - 50 U/L    Alkaline Phosphatase 167 (H) 30 - 99 U/L    Total Bilirubin 0.3 0.1 - 1.5  mg/dL    Albumin 3.9 3.2 - 4.9 g/dL    Total Protein 7.0 6.0 - 8.2 g/dL    Globulin 3.1 1.9 - 3.5 g/dL    A-G Ratio 1.3 g/dL   ESTIMATED GFR    Collection Time: 06/14/24  8:49 AM   Result Value Ref Range    GFR (CKD-EPI) 126 >60 mL/min/1.73 m 2       Fluids    Intake/Output Summary (Last 24 hours) at 6/14/2024 1429  Last data filed at 6/14/2024 1250  Gross per 24 hour   Intake 322.5 ml   Output 310 ml   Net 12.5 ml       Core Measures & Quality Metrics  Medications reviewed  Kaiser catheter: No Kaiser      DVT: Heparin drip paused for surgery and hemovac removal.  DVT prophylaxis - mechanical: SCDs  Ulcer prophylaxis: No    Assessed for rehab: Patient was assess for and/or received rehabilitation services during this hospitalization    RAP Score Total: 4    CAGE Results: positive Blood Alcohol>0.08: no CAGE Score: 0  Total: POSITIVE  Assessment complete date: 6/5/2024  Intervention: Complete. Patient response to intervention: patient rreports he uses substances recreationally, declines intervention..   Patient demonstrates understanding of intervention. Patient does not agree to follow-up.   has not been contacted. Total ETOH intervention time: 15 - 30 mintues      Assessment/Plan  * Trauma- (present on admission)  Assessment & Plan  Jumped off an embankment ~ 10 ft height.  Trauma Green Activation.  Cosmo Ruiz MD. Trauma Surgery.     Acute deep vein thrombosis (DVT) of left lower extremity (HCC)- (present on admission)  Assessment & Plan  Subacute and chronic left lower extremity DVT noted on LE Duplex US (6/6).  Patient has a known history of DVT and has completed a 6mo course of Xarelto.   6/6 Heparin gtt initiated and okayed by neurosurgery.  6/12 heparin stopped for surgery.  Dr. Briscoe would like drip to remain off until removal of Hemovac.  6/14 Hemovac discontinued.   Anti-Xa protocol.       Closed fracture of seventh cervical vertebra (HCC)- (present on admission)  Assessment &  Plan  Left C7 superior facet fracture with jumped facet at C6/C7.  2.7 mm anterolisthesis C6 on C7  CTA within normal limits.  6/12 Left cervical 6/7 facetectomy. Hemovac in place.  6/13 Hemovac discontinued.  Cervical immobilization. Ok to mobilize with collar securely on.    Mitch Leon MD, PhD. Neurosurgeon. [facility].     Tobacco use- (present on admission)  Assessment & Plan  6/9 Nicotine replacement.   Cessation education.    Fracture three ribs-closed, right, initial encounter- (present on admission)  Assessment & Plan  subtle anterior right fourth through sixth rib fractures.  Aggressive multimodal pain management, and pulmonary hygiene. Serial chest radiographs.     No contraindication to deep vein thrombosis (DVT) prophylaxis- (present on admission)  Assessment & Plan  VTE prophylaxis initially contraindicated secondary to elevated bleeding risk.  6/6 Left lower extremity DVT identified. Heparin gtt initiated.   6/12 heparin stopped for surgery.  Dr. Briscoe would like drip to remain off until removal of Hemovac.    Laceration of head- (present on admission)  Assessment & Plan  Stapled in ED.  Routine remove in 7 days         Discussed patient condition with RN, Patient, and trauma surgery, Dr. Kemp.

## 2024-06-14 NOTE — CARE PLAN
The patient is Stable - Low risk of patient condition declining or worsening    Shift Goals  Clinical Goals: pain mgt, neuro checks, safety  Patient Goals: rest and comfort  Family Goals: Not present    Progress made toward(s) clinical / shift goals:      Patient is aox4, denies any SOB/Chest pain/N and V. Able to follow commands, dressing is C/D/I.      Problem: Pain - Standard  Goal: Alleviation of pain or a reduction in pain to the patient’s comfort goal  Outcome: Progressing   Denies any numbness or tingling sensation, non-pharmacological and pharmacological intervention in place. Has ongoing PCA(morphine).       Problem: Fall Risk  Goal: Patient will remain free from falls  Outcome: Progressing   Bed alarm on, call light and belongings within reach, kept bed in lowest position. Instructed to call RN if getting OOB. SCDs and hourly rounds in place.      Problem: Neuro Status  Goal: Neuro status will remain stable or improve  Outcome: Progressing   Neuro status stable.    Patient is not progressing towards the following goals:

## 2024-06-14 NOTE — THERAPY
"Occupational Therapy  Daily Treatment     Patient Name: Salvador Romo  Age:  39 y.o., Sex:  male  Medical Record #: [id number]  Today's Date: 6/14/2024     Precautions: Fall Risk, Spinal / Back Precautions   Comments: C-collar AAT    Assessment    Pt seen for OT tx to include: bed mobility, transfers, LB dressing. Pt now without Hemovac or PCA. Reports ongoing neck pain in bed but willing to participate in OT session. Pt mobilized to EOB via good logroll then immediately hollering due to posterior neck pain. Quickly returned self to supine. Attempted mobility again, this time with less controlled log roll. Pt donned PJ bottoms then requested walk in hallway in hopes of alleviating pain. Pt completed short walk with no change in pain; required return to supine. Pt continues to be limited by poor pain control. He is not tolerating functional levels of OOB activity at this time. RN notified. Will continue to follow for acute OT while in-house.     Plan    Treatment Plan Status: Continue Current Treatment Plan  Type of Treatment: Self Care / Activities of Daily Living, Adaptive Equipment, Therapeutic Activity, Orthotics Treatment  Treatment Frequency: 3 Times per Week  Treatment Duration: Until Therapy Goals Met    DC Equipment Recommendations: Reacher  Discharge Recommendations: Anticipate that the patient will have no further occupational therapy needs after discharge from the hospital    Subjective    \"My neck really hurts!\"     Objective       06/14/24 1307   Treatment Charges   Charges Yes   OT Self Care / ADL (Units) 2   Total Time Spent   OT Time Spent Yes   OT Self Care / ADL (Minutes) 23   OT Total Time Spent (Calculated) 23    Services   Is patient using  services for this encounter? No   Precautions   Precautions Fall Risk;Spinal / Back Precautions    Comments C-collar AAT   Vitals   O2 (LPM) 0   O2 Delivery Device None - Room Air   Pain   Pain Scales 0 to 10 Scale    Pain 0 - 10 " Group   Location Neck   Location Orientation Posterior   Therapist Pain Assessment During Activity;Nurse Notified  (not rated but c/o severe neck pain with OOB activity; very limited tolerance this session)   Non Verbal Descriptors   Non Verbal Scale    (Hollering with OOB activity)   Cognition    Cognition / Consciousness WDL   Level of Consciousness Alert   Comments limited attention; distracted by pain   Other Treatments   Other Treatments Provided Ongoing education on [place] J AAT and spine precautions. RN placed order for replacement collar pads as pt will need to swap out after showering   Balance   Sitting Balance (Static) Good   Sitting Balance (Dynamic) Fair +   Standing Balance (Static) Fair   Standing Balance (Dynamic) Fair   Weight Shift Sitting Good   Weight Shift Standing Fair   Skilled Intervention Compensatory Strategies;Verbal Cuing;Postural Facilitation   Comments no AD, no LOB   Bed Mobility    Supine to Sit Supervised   Sit to Supine Supervised   Scooting Supervised   Rolling Supervised   Skilled Intervention Compensatory Strategies;Postural Facilitation;Verbal Cuing   Comments cues for consistent log roll   Activities of Daily Living   Upper Body Dressing   (declined; unable to locate t-shirt)   Lower Body Dressing Supervision  (don PJ bottoms)   Toileting   (declined need)   Skilled Intervention Compensatory Strategies;Verbal Cuing   How much help from another person does the patient currently need...   Putting on and taking off regular lower body clothing? 4   Bathing (including washing, rinsing, and drying)? 3   Toileting, which includes using a toilet, bedpan, or urinal? 4   Putting on and taking off regular upper body clothing? 3   Taking care of personal grooming such as brushing teeth? 3   Eating meals? 4   6 Clicks Daily Activity Score 21   Functional Mobility   Sit to Stand Supervised   Bed, Chair, Wheelchair Transfer Supervised   Transfer Method Stand Step   Mobility Supine > < EOB,  short gait and transfers without AD   Distance (Feet) 50   # of Times Distance was Traveled 2   Skilled Intervention Verbal Cuing   Activity Tolerance   Sitting in Chair declined due to pain   Sitting Edge of Bed 5 min   Standing 4 min   Comments limited by pain   Short Term Goals   Short Term Goal # 1 pt will demo dressing ADLs while maintaining spinal precautions without cues post-op c-spine surgery   Goal Outcome # 1 Progressing as expected   Short Term Goal # 2 pt will complete all functional transfers at SPV level   Goal Outcome # 2 Progressing as expected   Short Term Goal # 3 Pt will complete toileting with supv   Goal Outcome # 3   (declined this session due to pain)   Short Term Goal # 4 Pt will demo neutral spine at all times during functional activity   Goal Outcome # 4 Progressing as expected   Education Group   Education Provided Brace Wear and Care;Spinal Precautions   Spinal Precautions Patient Response Patient;Acceptance;Explanation;Demonstration;Verbal Demonstration;Action Demonstration;Reinforcement Needed   Brace Wear & Care Patient Response Patient;Acceptance;Explanation;Verbal Demonstration  (ongoing education on technique for changing collar pads)   Occupational Therapy Treatment Plan    O.T. Treatment Plan Continue Current Treatment Plan   Anticipated Discharge Equipment and Recommendations   DC Equipment Recommendations Reacher   Discharge Recommendations Anticipate that the patient will have no further occupational therapy needs after discharge from the hospital   Interdisciplinary Plan of Care Collaboration   IDT Collaboration with  Nursing   Patient Position at End of Therapy In Bed;Call Light within Reach;Tray Table within Reach;Family / Friend in Room;Phone within Reach   Collaboration Comments OT results and recs; ongoing poorly controlled pain   Session Information   Date / Session Number  6/14 #3 (2/3, 6/19)

## 2024-06-15 ENCOUNTER — PHARMACY VISIT (OUTPATIENT)
Dept: PHARMACY | Facility: MEDICAL CENTER | Age: 39
End: 2024-06-15
Payer: COMMERCIAL

## 2024-06-15 VITALS
SYSTOLIC BLOOD PRESSURE: 119 MMHG | HEART RATE: 69 BPM | TEMPERATURE: 97.3 F | HEIGHT: 72 IN | DIASTOLIC BLOOD PRESSURE: 77 MMHG | OXYGEN SATURATION: 93 % | RESPIRATION RATE: 17 BRPM | BODY MASS INDEX: 25.08 KG/M2 | WEIGHT: 185.19 LBS

## 2024-06-15 PROCEDURE — 99239 HOSP IP/OBS DSCHRG MGMT >30: CPT

## 2024-06-15 PROCEDURE — 700102 HCHG RX REV CODE 250 W/ 637 OVERRIDE(OP): Performed by: REGISTERED NURSE

## 2024-06-15 PROCEDURE — A9270 NON-COVERED ITEM OR SERVICE: HCPCS | Performed by: NURSE PRACTITIONER

## 2024-06-15 PROCEDURE — RXOTC WILLOW AMBULATORY OTC CHARGE

## 2024-06-15 PROCEDURE — A9270 NON-COVERED ITEM OR SERVICE: HCPCS

## 2024-06-15 PROCEDURE — 700102 HCHG RX REV CODE 250 W/ 637 OVERRIDE(OP): Performed by: NURSE PRACTITIONER

## 2024-06-15 PROCEDURE — 700111 HCHG RX REV CODE 636 W/ 250 OVERRIDE (IP): Performed by: NEUROLOGICAL SURGERY

## 2024-06-15 PROCEDURE — 700101 HCHG RX REV CODE 250

## 2024-06-15 PROCEDURE — 700105 HCHG RX REV CODE 258: Performed by: NEUROLOGICAL SURGERY

## 2024-06-15 PROCEDURE — 700102 HCHG RX REV CODE 250 W/ 637 OVERRIDE(OP)

## 2024-06-15 PROCEDURE — RXMED WILLOW AMBULATORY MEDICATION CHARGE

## 2024-06-15 PROCEDURE — A9270 NON-COVERED ITEM OR SERVICE: HCPCS | Performed by: REGISTERED NURSE

## 2024-06-15 RX ORDER — OXYCODONE HYDROCHLORIDE AND ACETAMINOPHEN 5; 325 MG/1; MG/1
1-2 TABLET ORAL EVERY 4 HOURS PRN
Qty: 28 TABLET | Refills: 0 | Status: SHIPPED | OUTPATIENT
Start: 2024-06-15 | End: 2024-06-15

## 2024-06-15 RX ORDER — ACETAMINOPHEN 500 MG
1000 TABLET ORAL EVERY 6 HOURS PRN
COMMUNITY
Start: 2024-06-15

## 2024-06-15 RX ORDER — OXYCODONE HYDROCHLORIDE 10 MG/1
10 TABLET ORAL EVERY 4 HOURS PRN
Status: DISCONTINUED | OUTPATIENT
Start: 2024-06-15 | End: 2024-06-15 | Stop reason: HOSPADM

## 2024-06-15 RX ORDER — GUAIFENESIN 600 MG/1
600 TABLET, EXTENDED RELEASE ORAL PRN
COMMUNITY
Start: 2024-06-15

## 2024-06-15 RX ORDER — ACETAMINOPHEN 500 MG
1000 TABLET ORAL EVERY 6 HOURS PRN
Status: DISCONTINUED | OUTPATIENT
Start: 2024-06-15 | End: 2024-06-15 | Stop reason: HOSPADM

## 2024-06-15 RX ORDER — LIDOCAINE 4 G/G
3 PATCH TOPICAL EVERY 24 HOURS
Status: DISCONTINUED | OUTPATIENT
Start: 2024-06-15 | End: 2024-06-15 | Stop reason: HOSPADM

## 2024-06-15 RX ORDER — AMOXICILLIN 250 MG
1 CAPSULE ORAL DAILY
COMMUNITY
Start: 2024-06-15

## 2024-06-15 RX ORDER — METHOCARBAMOL 750 MG/1
750 TABLET, FILM COATED ORAL 4 TIMES DAILY
Qty: 56 TABLET | Refills: 0 | Status: SHIPPED | OUTPATIENT
Start: 2024-06-15 | End: 2024-06-29

## 2024-06-15 RX ORDER — OXYCODONE HYDROCHLORIDE 5 MG/1
5 TABLET ORAL EVERY 4 HOURS PRN
Status: DISCONTINUED | OUTPATIENT
Start: 2024-06-15 | End: 2024-06-15 | Stop reason: HOSPADM

## 2024-06-15 RX ORDER — OXYCODONE HYDROCHLORIDE 5 MG/1
5-10 TABLET ORAL EVERY 6 HOURS PRN
Qty: 28 TABLET | Refills: 0 | Status: SHIPPED | OUTPATIENT
Start: 2024-06-15 | End: 2024-06-20

## 2024-06-15 RX ADMIN — OXYCODONE AND ACETAMINOPHEN 2 TABLET: 5; 325 TABLET ORAL at 04:19

## 2024-06-15 RX ADMIN — LIDOCAINE 3 PATCH: 4 PATCH TOPICAL at 11:05

## 2024-06-15 RX ADMIN — ACETAMINOPHEN 1000 MG: 500 TABLET, FILM COATED ORAL at 04:51

## 2024-06-15 RX ADMIN — BACITRACIN ZINC: 500 OINTMENT TOPICAL at 04:26

## 2024-06-15 RX ADMIN — DOCUSATE SODIUM 100 MG: 100 CAPSULE, LIQUID FILLED ORAL at 04:20

## 2024-06-15 RX ADMIN — GUAIFENESIN 600 MG: 600 TABLET, EXTENDED RELEASE ORAL at 04:20

## 2024-06-15 RX ADMIN — GABAPENTIN 300 MG: 300 CAPSULE ORAL at 04:20

## 2024-06-15 RX ADMIN — CEFAZOLIN 2 G: 2 INJECTION, POWDER, FOR SOLUTION INTRAMUSCULAR; INTRAVENOUS at 04:22

## 2024-06-15 RX ADMIN — OXYCODONE HYDROCHLORIDE 10 MG: 10 TABLET ORAL at 11:05

## 2024-06-15 RX ADMIN — SERTRALINE HYDROCHLORIDE 25 MG: 50 TABLET ORAL at 04:21

## 2024-06-15 RX ADMIN — METAXALONE 800 MG: 800 TABLET ORAL at 12:24

## 2024-06-15 RX ADMIN — METAXALONE 800 MG: 800 TABLET ORAL at 04:20

## 2024-06-15 ASSESSMENT — PAIN DESCRIPTION - PAIN TYPE
TYPE: SURGICAL PAIN
TYPE: SURGICAL PAIN

## 2024-06-15 NOTE — DISCHARGE INSTRUCTIONS
Do not exceed 4,000 mg or 4 g of acetminophen (Tylenol) within 24 hours.    Cervical collar on at all times.   Okay to remove dressing and shower 24 hours after drain removed  RTC 2 weeks staple removal        - Call or seek medical attention for questions or concerns  - Follow up with the [facility] RETURN: PRN  - Follow up with Dr. Leon in 2 weeks time, avoid all blood thinners including aspirin or NSAIDs (ibuprophen, Advil, Aleve, Motrin) for at least two weeks  - Follow up with primary care provider within one weeks time  -Wear [place] J collar at all times.  - Resume regular diet  - May take over the counter acetaminophen as needed for pain  - Continue daily over the counter stool softener while on narcotics  - No operation of machinery or motorized vehicles while under the influence of narcotics  - No alcohol, marijuana or illicit drug use while under the influence of narcotics  - In the event of a narcotic overdose naloxone (Narcan) is available without a prescription from any [facility] or [facility]  - No swimming, hot tubs, baths or wound submersion until cleared by outpatient provider. May shower  - No contact sports, strenuous activities, or heavy lifting until cleared by outpatient provider  - If respiratory decompensation, persistent or worsening pain, or signs or symptoms of infection occur seek medical attention

## 2024-06-15 NOTE — PROGRESS NOTES
0431 Voalted AMARILIS Abreu Trauma Surgery, patient wanted some tylenol 1000 mg for headache. Verbalized it works better than the Fioricet.

## 2024-06-15 NOTE — PROGRESS NOTES
Trauma / Surgical Daily Progress Note     Date of Service  6/14/2024     Chief Complaint  39 y.o. male admitted 6/5/2024 with cervical neck injury after jumping off a 10 ft wall. History of poly-substance abuse and above knee DVT.      POD #3 left cervical 6/7 facetectomy    No critical events overnight  Adequate pain control with p.o. medication.  Tolerating diet.  Last BM 6/13    A/Ox4.  Respiratory rate even and unlabored.  [place] J in place.  Spare pads at bedside.  5/5 in all extremities except LUE 4/5  Surgical dressing in place.  Clean dry and intact  Abrasions to bilateral anterior feet.    Cleared for discharge.  Discussed with patient prescriptions and the importance of following up with neurosurgery.  He understands that he needs to wear his [place] J at all times.

## 2024-06-15 NOTE — CARE PLAN
Problem: Pain - Standard  Goal: Alleviation of pain or a reduction in pain to the patient’s comfort goal  Outcome: Progressing     Problem: Knowledge Deficit - Standard  Goal: Patient and family/care givers will demonstrate understanding of plan of care, disease process/condition, diagnostic tests and medications  Outcome: Progressing       The patient is Stable - Low risk of patient condition declining or worsening    Shift Goals  Clinical Goals: Pain control  Patient Goals: Pain control  Family Goals: comfort    Progress made toward(s) clinical / shift goals:  Taught pt 0-10 pain scale and non-pharmacological method of pain management, encouraged to verbalize when in pain. Administered PRN pain meds as needed.     Patient is not progressing towards the following goals:

## 2024-06-15 NOTE — PROGRESS NOTES
1235 Staples removed per orders, pt tolerated well.     1250 Confiscated vapes and home medication from pharmacy returned back to pt. Pt wheeled off unit with all belongings with ACT-B.

## 2024-06-15 NOTE — PROGRESS NOTES
Neurosurgery Progress Note    Subjective:  No acute events overnight      Exam:  AAO, collar on, cooperative,   UE str 5/5 except left tricep 4+/5  Sensation intact touch 4 extremities  Dressing intact      BP  Min: 114/73  Max: 134/76  Pulse  Av.3  Min: 69  Max: 78  Resp  Av.5  Min: 16  Max: 17  Temp  Av.5 °C (97.7 °F)  Min: 36.3 °C (97.3 °F)  Max: 36.7 °C (98.1 °F)  SpO2  Av.3 %  Min: 93 %  Max: 96 %    No data recorded    Recent Labs     24  0440 24  0849   WBC 11.0* 8.4   RBC 4.26* 4.28*   HEMOGLOBIN 13.9* 13.8*   HEMATOCRIT 40.2* 40.2*   MCV 94.4 93.9   MCH 32.6 32.2   MCHC 34.6 34.3   RDW 46.0 44.2   PLATELETCT 310 322   MPV 9.4 9.4     Recent Labs     24  0440 24  0849   SODIUM 139 138   POTASSIUM 3.8 4.1   CHLORIDE 101 102   CO2 23 23   GLUCOSE 113* 104*   BUN 16 13   CREATININE 0.64 0.59   CALCIUM 9.1 9.3                 Intake/Output                         24 07 - 06/15/24 0659 06/15/24 07 - 24 0659     [id number] 4152-1821 Total  Total                 Intake    Total Intake -- -- -- -- -- --       Output    Urine  --  160 160  --  -- --    Number of Times Voided -- 1 x 1 x -- -- --    Urine Void (mL) -- 160 160 -- -- --    Drains  0  -- 0  --  -- --    Output (mL) ([REMOVED] Closed/Suction Drain 1 Posterior Neck Hemovac 24 1000) 0 -- 0 -- -- --    Stool  --  -- --  --  -- --    Number of Times Stooled -- 0 x 0 x -- -- --    Total Output 0 160 160 -- -- --       Net I/O     0 -160 -160 -- -- --              Intake/Output Summary (Last 24 hours) at 6/15/2024 0929  Last data filed at 2024  Gross per 24 hour   Intake --   Output 160 ml   Net -160 ml             acetaminophen  1,000 mg Q6HRS PRN    oxyCODONE immediate-release  5 mg Q4HRS PRN    Or    oxyCODONE immediate-release  10 mg Q4HRS PRN    lidocaine  3 Patch Q24HR    guaiFENesin ER  600 mg Q12HRS    Pharmacy Consult Request  1 Each PHARMACY TO DOSE     carboxymethylcellulose  1 Drop PRN    menthol  1 Lozenge Q2HRS PRN    nicotine  14 mg Daily-0600    And    nicotine polacrilex  2 mg Q HOUR PRN    sertraline  25 mg DAILY    cloNIDine  0.1 mg TID PRN    gabapentin  300 mg Q8HRS    traZODone  50 mg QHS    heparin  0-30 Units/kg/hr Continuous    heparin  40 Units/kg PRN    Respiratory Therapy Consult   Continuous RT    ondansetron  4 mg Q4HRS PRN    ondansetron  4 mg Q4HRS PRN    docusate sodium  100 mg BID    senna-docusate  1 Tablet Nightly    senna-docusate  1 Tablet Q24HRS PRN    polyethylene glycol/lytes  1 Packet BID    magnesium hydroxide  30 mL DAILY AT 1800    bisacodyl  10 mg Q24HRS PRN    sodium phosphate  1 Each Once PRN    metaxalone  800 mg TID       Assessment and Plan:  POD #`3 open reduction left C6/7 jumped facet, lateral mass fusion bilateral C6/7Prophylactic anticoagulation: no         Start date/time: 24 hours after drain has been removed     Brain Compression: No  Ok to ambulate  Cervical collar on all times  Ambulate with assistance  Okay to remove dressing and shower 24 hours after drain removed  RTC 2 weeks staple removal  Dc home pending

## 2024-06-15 NOTE — CARE PLAN
The patient is Stable - Low risk of patient condition declining or worsening    Shift Goals  Clinical Goals: pain control, Neuro checks, safety and comfort  Patient Goals: comfort  Family Goals: comfort    Progress made toward(s) clinical / shift goals:    Problem: Pain - Standard  Goal: Alleviation of pain or a reduction in pain to the patient’s comfort goal  Outcome: Progressing     Problem: Knowledge Deficit - Standard  Goal: Patient and family/care givers will demonstrate understanding of plan of care, disease process/condition, diagnostic tests and medications  Outcome: Progressing     Problem: Fall Risk  Goal: Patient will remain free from falls  Outcome: Progressing     Patient is alert and oriented, on RA. With C-collar in placed  Fall protocol in effect. Bed in lowest position. Brakes and bed alarm on.   Maintained a clutter free environment. Skid socks on. Call light and personal belongings within reach.   Patient c/o of pain, treated per MAR. Educated on pain scale.   Patient educated on POC. Encouraged verbalize of feelings.   All questions were answered.      Patient is not progressing towards the following goals:
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
## 2024-06-15 NOTE — NON-PROVIDER
Discharge instructions reviewed with the patient. Answered all questions. Patient with family at bedside.

## 2024-06-17 ENCOUNTER — TELEPHONE (OUTPATIENT)
Dept: HEALTH INFORMATION MANAGEMENT | Facility: OTHER | Age: 39
End: 2024-06-17
Payer: COMMERCIAL

## 2024-06-18 PROCEDURE — RXMED WILLOW AMBULATORY MEDICATION CHARGE

## 2024-06-19 ENCOUNTER — PHARMACY VISIT (OUTPATIENT)
Dept: PHARMACY | Facility: MEDICAL CENTER | Age: 39
End: 2024-06-19
Payer: COMMERCIAL

## 2024-06-19 NOTE — PROGRESS NOTES
The patient was discharged without an order for Xaerlto for a subacute chronic DVT found on the duplex this hospital admission.  I have confirmed with Dr. Leon that Xarelto can be initiated. I have personally called the patient and discussed this with him.  I have sent the prescription to [facility] pharmacy.  Confirm that they have received it.  He was concerned of the cost since he is on [employer].  The pharmacy will apply a one-time coupon to assist him with that cost.  I have called the patient back and was unable to speak to him directly.  I did leave a message giving him the address to [employer] and requesting him to pick it up today and additional follow-up information at the vascular clinic.    1900: I was able to speak to the patient and he confirmed receiving my message.

## 2025-07-20 ENCOUNTER — APPOINTMENT (OUTPATIENT)
Dept: RADIOLOGY | Facility: MEDICAL CENTER | Age: 40
DRG: 089 | End: 2025-07-20
Attending: STUDENT IN AN ORGANIZED HEALTH CARE EDUCATION/TRAINING PROGRAM
Payer: OTHER MISCELLANEOUS

## 2025-07-20 ENCOUNTER — HOSPITAL ENCOUNTER (INPATIENT)
Facility: MEDICAL CENTER | Age: 40
LOS: 2 days | DRG: 089 | End: 2025-07-22
Attending: SURGERY | Admitting: SURGERY
Payer: OTHER MISCELLANEOUS

## 2025-07-20 ENCOUNTER — APPOINTMENT (OUTPATIENT)
Dept: RADIOLOGY | Facility: MEDICAL CENTER | Age: 40
DRG: 089 | End: 2025-07-20
Attending: SURGERY
Payer: OTHER MISCELLANEOUS

## 2025-07-20 DIAGNOSIS — S32.009A LUMBAR TRANSVERSE PROCESS FRACTURE, CLOSED, INITIAL ENCOUNTER (HCC): Primary | ICD-10-CM

## 2025-07-20 PROBLEM — S06.0X0A CONCUSSION WITH NO LOSS OF CONSCIOUSNESS, INITIAL ENCOUNTER: Status: ACTIVE | Noted: 2025-07-20

## 2025-07-20 PROBLEM — T14.90XA TRAUMA: Status: ACTIVE | Noted: 2025-07-20

## 2025-07-20 PROBLEM — Z86.718 HISTORY OF DEEP VEIN THROMBOSIS (DVT) OF LOWER EXTREMITY: Status: ACTIVE | Noted: 2025-07-20

## 2025-07-20 PROBLEM — R40.4 ALTERED LEVEL OF CONSCIOUSNESS: Status: ACTIVE | Noted: 2025-07-20

## 2025-07-20 PROBLEM — S06.0X9A CONCUSSION W LOSS OF CONSCIOUSNESS OF UNSP DURATION, INIT: Status: ACTIVE | Noted: 2025-07-20

## 2025-07-20 PROBLEM — F48.9 MENTAL HEALTH PROBLEM: Status: ACTIVE | Noted: 2025-07-20

## 2025-07-20 PROBLEM — R00.0 TACHYCARDIA: Status: ACTIVE | Noted: 2025-07-20

## 2025-07-20 PROBLEM — Z79.01: Status: ACTIVE | Noted: 2025-07-20

## 2025-07-20 LAB
ABO GROUP BLD: NORMAL
ALBUMIN SERPL BCP-MCNC: 4.7 G/DL (ref 3.2–4.9)
ALBUMIN/GLOB SERPL: 1.6 G/DL
ALP SERPL-CCNC: 101 U/L (ref 30–99)
ALT SERPL-CCNC: 17 U/L (ref 2–50)
ANION GAP SERPL CALC-SCNC: 19 MMOL/L (ref 7–16)
APTT PPP: 23.7 SEC (ref 24.7–36)
AST SERPL-CCNC: 29 U/L (ref 12–45)
BILIRUB SERPL-MCNC: 0.8 MG/DL (ref 0.1–1.5)
BLD GP AB SCN SERPL QL: NORMAL
BUN SERPL-MCNC: 13 MG/DL (ref 8–22)
CALCIUM ALBUM COR SERPL-MCNC: 8.2 MG/DL (ref 8.5–10.5)
CALCIUM SERPL-MCNC: 8.8 MG/DL (ref 8.5–10.5)
CHLORIDE SERPL-SCNC: 103 MMOL/L (ref 96–112)
CO2 SERPL-SCNC: 14 MMOL/L (ref 20–33)
CREAT SERPL-MCNC: 1.19 MG/DL (ref 0.5–1.4)
ERYTHROCYTE [DISTWIDTH] IN BLOOD BY AUTOMATED COUNT: 44.6 FL (ref 35.9–50)
ETHANOL BLD-MCNC: <10.1 MG/DL
GFR SERPLBLD CREATININE-BSD FMLA CKD-EPI: 79 ML/MIN/1.73 M 2
GLOBULIN SER CALC-MCNC: 2.9 G/DL (ref 1.9–3.5)
GLUCOSE BLD STRIP.AUTO-MCNC: 97 MG/DL (ref 65–99)
GLUCOSE SERPL-MCNC: 177 MG/DL (ref 65–99)
HCT VFR BLD AUTO: 47.6 % (ref 42–52)
HGB BLD-MCNC: 16.4 G/DL (ref 14–18)
INR PPP: 1.13 (ref 0.87–1.13)
MCH RBC QN AUTO: 32.2 PG (ref 27–33)
MCHC RBC AUTO-ENTMCNC: 34.5 G/DL (ref 32.3–36.5)
MCV RBC AUTO: 93.3 FL (ref 81.4–97.8)
PLATELET # BLD AUTO: 274 K/UL (ref 164–446)
PMV BLD AUTO: 9.2 FL (ref 9–12.9)
POTASSIUM SERPL-SCNC: 3.4 MMOL/L (ref 3.6–5.5)
PROT SERPL-MCNC: 7.6 G/DL (ref 6–8.2)
PROTHROMBIN TIME: 14.5 SEC (ref 12–14.6)
RBC # BLD AUTO: 5.1 M/UL (ref 4.7–6.1)
RH BLD: NORMAL
SODIUM SERPL-SCNC: 136 MMOL/L (ref 135–145)
WBC # BLD AUTO: 16.9 K/UL (ref 4.8–10.8)

## 2025-07-20 PROCEDURE — 700102 HCHG RX REV CODE 250 W/ 637 OVERRIDE(OP): Performed by: SURGERY

## 2025-07-20 PROCEDURE — 36415 COLL VENOUS BLD VENIPUNCTURE: CPT

## 2025-07-20 PROCEDURE — 70450 CT HEAD/BRAIN W/O DYE: CPT

## 2025-07-20 PROCEDURE — 700101 HCHG RX REV CODE 250: Performed by: SURGERY

## 2025-07-20 PROCEDURE — 72125 CT NECK SPINE W/O DYE: CPT

## 2025-07-20 PROCEDURE — 85730 THROMBOPLASTIN TIME PARTIAL: CPT

## 2025-07-20 PROCEDURE — 96374 THER/PROPH/DIAG INJ IV PUSH: CPT

## 2025-07-20 PROCEDURE — 80053 COMPREHEN METABOLIC PANEL: CPT

## 2025-07-20 PROCEDURE — 85027 COMPLETE CBC AUTOMATED: CPT

## 2025-07-20 PROCEDURE — 82077 ASSAY SPEC XCP UR&BREATH IA: CPT

## 2025-07-20 PROCEDURE — 700111 HCHG RX REV CODE 636 W/ 250 OVERRIDE (IP): Mod: JZ

## 2025-07-20 PROCEDURE — 72170 X-RAY EXAM OF PELVIS: CPT

## 2025-07-20 PROCEDURE — 71260 CT THORAX DX C+: CPT

## 2025-07-20 PROCEDURE — 99285 EMERGENCY DEPT VISIT HI MDM: CPT

## 2025-07-20 PROCEDURE — 700105 HCHG RX REV CODE 258: Performed by: SURGERY

## 2025-07-20 PROCEDURE — 700102 HCHG RX REV CODE 250 W/ 637 OVERRIDE(OP): Performed by: STUDENT IN AN ORGANIZED HEALTH CARE EDUCATION/TRAINING PROGRAM

## 2025-07-20 PROCEDURE — 72128 CT CHEST SPINE W/O DYE: CPT

## 2025-07-20 PROCEDURE — 86901 BLOOD TYPING SEROLOGIC RH(D): CPT

## 2025-07-20 PROCEDURE — 96375 TX/PRO/DX INJ NEW DRUG ADDON: CPT

## 2025-07-20 PROCEDURE — 82962 GLUCOSE BLOOD TEST: CPT | Performed by: SURGERY

## 2025-07-20 PROCEDURE — 86900 BLOOD TYPING SEROLOGIC ABO: CPT

## 2025-07-20 PROCEDURE — 700111 HCHG RX REV CODE 636 W/ 250 OVERRIDE (IP): Mod: JZ | Performed by: SURGERY

## 2025-07-20 PROCEDURE — 700117 HCHG RX CONTRAST REV CODE 255: Performed by: STUDENT IN AN ORGANIZED HEALTH CARE EDUCATION/TRAINING PROGRAM

## 2025-07-20 PROCEDURE — 85610 PROTHROMBIN TIME: CPT

## 2025-07-20 PROCEDURE — A9270 NON-COVERED ITEM OR SERVICE: HCPCS | Performed by: SURGERY

## 2025-07-20 PROCEDURE — A9270 NON-COVERED ITEM OR SERVICE: HCPCS | Performed by: STUDENT IN AN ORGANIZED HEALTH CARE EDUCATION/TRAINING PROGRAM

## 2025-07-20 PROCEDURE — 72131 CT LUMBAR SPINE W/O DYE: CPT

## 2025-07-20 PROCEDURE — 86850 RBC ANTIBODY SCREEN: CPT

## 2025-07-20 PROCEDURE — 305950 HCHG YELLOW TRAUMA ACT PRE-NOTIFY NO CC

## 2025-07-20 PROCEDURE — 770001 HCHG ROOM/CARE - MED/SURG/GYN PRIV*

## 2025-07-20 PROCEDURE — 71045 X-RAY EXAM CHEST 1 VIEW: CPT

## 2025-07-20 RX ORDER — DOCUSATE SODIUM 100 MG/1
100 CAPSULE, LIQUID FILLED ORAL 2 TIMES DAILY
Status: DISCONTINUED | OUTPATIENT
Start: 2025-07-20 | End: 2025-07-22 | Stop reason: HOSPADM

## 2025-07-20 RX ORDER — ONDANSETRON 2 MG/ML
4 INJECTION INTRAMUSCULAR; INTRAVENOUS EVERY 4 HOURS PRN
Status: DISCONTINUED | OUTPATIENT
Start: 2025-07-20 | End: 2025-07-22 | Stop reason: HOSPADM

## 2025-07-20 RX ORDER — OXYCODONE HYDROCHLORIDE 5 MG/1
5 TABLET ORAL
Refills: 0 | Status: DISCONTINUED | OUTPATIENT
Start: 2025-07-20 | End: 2025-07-22 | Stop reason: HOSPADM

## 2025-07-20 RX ORDER — AMOXICILLIN 250 MG
1 CAPSULE ORAL NIGHTLY
Status: DISCONTINUED | OUTPATIENT
Start: 2025-07-20 | End: 2025-07-22 | Stop reason: HOSPADM

## 2025-07-20 RX ORDER — CELECOXIB 200 MG/1
200 CAPSULE ORAL DAILY
Status: DISCONTINUED | OUTPATIENT
Start: 2025-07-21 | End: 2025-07-22 | Stop reason: HOSPADM

## 2025-07-20 RX ORDER — CELECOXIB 200 MG/1
200 CAPSULE ORAL
Status: DISCONTINUED | OUTPATIENT
Start: 2025-07-26 | End: 2025-07-21

## 2025-07-20 RX ORDER — OXYCODONE AND ACETAMINOPHEN 5; 325 MG/1; MG/1
1 TABLET ORAL ONCE
Refills: 0 | Status: COMPLETED | OUTPATIENT
Start: 2025-07-20 | End: 2025-07-20

## 2025-07-20 RX ORDER — POLYETHYLENE GLYCOL 3350 17 G/17G
1 POWDER, FOR SOLUTION ORAL 2 TIMES DAILY
Status: DISCONTINUED | OUTPATIENT
Start: 2025-07-20 | End: 2025-07-22 | Stop reason: HOSPADM

## 2025-07-20 RX ORDER — HALOPERIDOL 5 MG/ML
INJECTION INTRAMUSCULAR
Status: COMPLETED
Start: 2025-07-20 | End: 2025-07-20

## 2025-07-20 RX ORDER — SODIUM CHLORIDE 9 MG/ML
INJECTION, SOLUTION INTRAVENOUS
Status: COMPLETED | OUTPATIENT
Start: 2025-07-20 | End: 2025-07-20

## 2025-07-20 RX ORDER — SODIUM PHOSPHATE,MONO-DIBASIC 19G-7G/118
1 ENEMA (ML) RECTAL
Status: DISCONTINUED | OUTPATIENT
Start: 2025-07-20 | End: 2025-07-22 | Stop reason: HOSPADM

## 2025-07-20 RX ORDER — MIDAZOLAM HYDROCHLORIDE 1 MG/ML
3 INJECTION INTRAMUSCULAR; INTRAVENOUS ONCE
Status: COMPLETED | OUTPATIENT
Start: 2025-07-20 | End: 2025-07-20

## 2025-07-20 RX ORDER — TRAZODONE HYDROCHLORIDE 150 MG/1
150 TABLET ORAL NIGHTLY
COMMUNITY

## 2025-07-20 RX ORDER — ACETAMINOPHEN 500 MG
1000 TABLET ORAL EVERY 6 HOURS PRN
Status: DISCONTINUED | OUTPATIENT
Start: 2025-07-25 | End: 2025-07-22 | Stop reason: HOSPADM

## 2025-07-20 RX ORDER — SODIUM CHLORIDE, SODIUM LACTATE, POTASSIUM CHLORIDE, CALCIUM CHLORIDE 600; 310; 30; 20 MG/100ML; MG/100ML; MG/100ML; MG/100ML
INJECTION, SOLUTION INTRAVENOUS CONTINUOUS
Status: DISCONTINUED | OUTPATIENT
Start: 2025-07-20 | End: 2025-07-21

## 2025-07-20 RX ORDER — ONDANSETRON 4 MG/1
4 TABLET, ORALLY DISINTEGRATING ORAL EVERY 4 HOURS PRN
Status: DISCONTINUED | OUTPATIENT
Start: 2025-07-20 | End: 2025-07-22 | Stop reason: HOSPADM

## 2025-07-20 RX ORDER — BACITRACIN ZINC AND POLYMYXIN B SULFATE 500; 1000 [USP'U]/G; [USP'U]/G
OINTMENT TOPICAL 3 TIMES DAILY
Status: DISCONTINUED | OUTPATIENT
Start: 2025-07-20 | End: 2025-07-22 | Stop reason: HOSPADM

## 2025-07-20 RX ORDER — ACETAMINOPHEN 500 MG
1000 TABLET ORAL EVERY 6 HOURS
Status: DISCONTINUED | OUTPATIENT
Start: 2025-07-20 | End: 2025-07-22 | Stop reason: HOSPADM

## 2025-07-20 RX ORDER — ENOXAPARIN SODIUM 100 MG/ML
40 INJECTION SUBCUTANEOUS EVERY 12 HOURS
Status: DISCONTINUED | OUTPATIENT
Start: 2025-07-21 | End: 2025-07-22 | Stop reason: HOSPADM

## 2025-07-20 RX ORDER — BISACODYL 10 MG
10 SUPPOSITORY, RECTAL RECTAL
Status: DISCONTINUED | OUTPATIENT
Start: 2025-07-20 | End: 2025-07-22 | Stop reason: HOSPADM

## 2025-07-20 RX ORDER — HYDROMORPHONE HYDROCHLORIDE 1 MG/ML
0.5 INJECTION, SOLUTION INTRAMUSCULAR; INTRAVENOUS; SUBCUTANEOUS
Status: DISCONTINUED | OUTPATIENT
Start: 2025-07-20 | End: 2025-07-22 | Stop reason: HOSPADM

## 2025-07-20 RX ORDER — OXYCODONE HYDROCHLORIDE 10 MG/1
10 TABLET ORAL
Refills: 0 | Status: DISCONTINUED | OUTPATIENT
Start: 2025-07-20 | End: 2025-07-22 | Stop reason: HOSPADM

## 2025-07-20 RX ORDER — AMOXICILLIN 250 MG
1 CAPSULE ORAL
Status: DISCONTINUED | OUTPATIENT
Start: 2025-07-20 | End: 2025-07-22 | Stop reason: HOSPADM

## 2025-07-20 RX ADMIN — ACETAMINOPHEN 1000 MG: 500 TABLET ORAL at 19:41

## 2025-07-20 RX ADMIN — OXYCODONE 5 MG: 5 TABLET ORAL at 22:02

## 2025-07-20 RX ADMIN — IOHEXOL 95 ML: 350 INJECTION, SOLUTION INTRAVENOUS at 18:15

## 2025-07-20 RX ADMIN — SODIUM CHLORIDE, POTASSIUM CHLORIDE, SODIUM LACTATE AND CALCIUM CHLORIDE: 600; 310; 30; 20 INJECTION, SOLUTION INTRAVENOUS at 19:49

## 2025-07-20 RX ADMIN — OXYCODONE AND ACETAMINOPHEN 1 TABLET: 5; 325 TABLET ORAL at 19:18

## 2025-07-20 RX ADMIN — SODIUM CHLORIDE, POTASSIUM CHLORIDE, SODIUM LACTATE AND CALCIUM CHLORIDE: 600; 310; 30; 20 INJECTION, SOLUTION INTRAVENOUS at 20:10

## 2025-07-20 RX ADMIN — MIDAZOLAM HYDROCHLORIDE 3 MG: 1 INJECTION, SOLUTION INTRAMUSCULAR; INTRAVENOUS at 17:30

## 2025-07-20 RX ADMIN — HYDROMORPHONE HYDROCHLORIDE 0.5 MG: 1 INJECTION, SOLUTION INTRAMUSCULAR; INTRAVENOUS; SUBCUTANEOUS at 23:20

## 2025-07-20 RX ADMIN — SODIUM CHLORIDE 1000 ML: 9 INJECTION, SOLUTION INTRAVENOUS at 17:24

## 2025-07-20 RX ADMIN — HALOPERIDOL LACTATE 5 MG: 5 INJECTION, SOLUTION INTRAMUSCULAR at 17:23

## 2025-07-20 RX ADMIN — Medication 1 EACH: at 19:48

## 2025-07-20 ASSESSMENT — PAIN DESCRIPTION - PAIN TYPE
TYPE: ACUTE PAIN

## 2025-07-21 LAB
ANION GAP SERPL CALC-SCNC: 11 MMOL/L (ref 7–16)
BASOPHILS # BLD AUTO: 0.4 % (ref 0–1.8)
BASOPHILS # BLD: 0.03 K/UL (ref 0–0.12)
BUN SERPL-MCNC: 10 MG/DL (ref 8–22)
CALCIUM SERPL-MCNC: 8.2 MG/DL (ref 8.5–10.5)
CHLORIDE SERPL-SCNC: 106 MMOL/L (ref 96–112)
CO2 SERPL-SCNC: 19 MMOL/L (ref 20–33)
CREAT SERPL-MCNC: 0.76 MG/DL (ref 0.5–1.4)
EOSINOPHIL # BLD AUTO: 0.09 K/UL (ref 0–0.51)
EOSINOPHIL NFR BLD: 1.1 % (ref 0–6.9)
ERYTHROCYTE [DISTWIDTH] IN BLOOD BY AUTOMATED COUNT: 44.9 FL (ref 35.9–50)
GFR SERPLBLD CREATININE-BSD FMLA CKD-EPI: 116 ML/MIN/1.73 M 2
GLUCOSE BLD STRIP.AUTO-MCNC: 114 MG/DL (ref 65–99)
GLUCOSE SERPL-MCNC: 122 MG/DL (ref 65–99)
HCT VFR BLD AUTO: 39 % (ref 42–52)
HGB BLD-MCNC: 13.4 G/DL (ref 14–18)
IMM GRANULOCYTES # BLD AUTO: 0.04 K/UL (ref 0–0.11)
IMM GRANULOCYTES NFR BLD AUTO: 0.5 % (ref 0–0.9)
LYMPHOCYTES # BLD AUTO: 1.87 K/UL (ref 1–4.8)
LYMPHOCYTES NFR BLD: 23.4 % (ref 22–41)
MAGNESIUM SERPL-MCNC: 1.8 MG/DL (ref 1.5–2.5)
MCH RBC QN AUTO: 32 PG (ref 27–33)
MCHC RBC AUTO-ENTMCNC: 34.4 G/DL (ref 32.3–36.5)
MCV RBC AUTO: 93.1 FL (ref 81.4–97.8)
MONOCYTES # BLD AUTO: 0.67 K/UL (ref 0–0.85)
MONOCYTES NFR BLD AUTO: 8.4 % (ref 0–13.4)
NEUTROPHILS # BLD AUTO: 5.28 K/UL (ref 1.82–7.42)
NEUTROPHILS NFR BLD: 66.2 % (ref 44–72)
NRBC # BLD AUTO: 0 K/UL
NRBC BLD-RTO: 0 /100 WBC (ref 0–0.2)
PHOSPHATE SERPL-MCNC: 3 MG/DL (ref 2.5–4.5)
PLATELET # BLD AUTO: 174 K/UL (ref 164–446)
PMV BLD AUTO: 9.2 FL (ref 9–12.9)
POTASSIUM SERPL-SCNC: 3.4 MMOL/L (ref 3.6–5.5)
RBC # BLD AUTO: 4.19 M/UL (ref 4.7–6.1)
SODIUM SERPL-SCNC: 136 MMOL/L (ref 135–145)
WBC # BLD AUTO: 8 K/UL (ref 4.8–10.8)

## 2025-07-21 PROCEDURE — 85025 COMPLETE CBC W/AUTO DIFF WBC: CPT

## 2025-07-21 PROCEDURE — A9270 NON-COVERED ITEM OR SERVICE: HCPCS | Performed by: REGISTERED NURSE

## 2025-07-21 PROCEDURE — A9270 NON-COVERED ITEM OR SERVICE: HCPCS | Performed by: SURGERY

## 2025-07-21 PROCEDURE — 97166 OT EVAL MOD COMPLEX 45 MIN: CPT

## 2025-07-21 PROCEDURE — 84100 ASSAY OF PHOSPHORUS: CPT

## 2025-07-21 PROCEDURE — 97535 SELF CARE MNGMENT TRAINING: CPT

## 2025-07-21 PROCEDURE — 80048 BASIC METABOLIC PNL TOTAL CA: CPT

## 2025-07-21 PROCEDURE — 700101 HCHG RX REV CODE 250: Performed by: SURGERY

## 2025-07-21 PROCEDURE — 700105 HCHG RX REV CODE 258: Performed by: SURGERY

## 2025-07-21 PROCEDURE — 700111 HCHG RX REV CODE 636 W/ 250 OVERRIDE (IP): Performed by: REGISTERED NURSE

## 2025-07-21 PROCEDURE — 82962 GLUCOSE BLOOD TEST: CPT | Performed by: SURGERY

## 2025-07-21 PROCEDURE — 97162 PT EVAL MOD COMPLEX 30 MIN: CPT

## 2025-07-21 PROCEDURE — 700102 HCHG RX REV CODE 250 W/ 637 OVERRIDE(OP): Performed by: REGISTERED NURSE

## 2025-07-21 PROCEDURE — 700102 HCHG RX REV CODE 250 W/ 637 OVERRIDE(OP): Performed by: SURGERY

## 2025-07-21 PROCEDURE — 770001 HCHG ROOM/CARE - MED/SURG/GYN PRIV*

## 2025-07-21 PROCEDURE — 700111 HCHG RX REV CODE 636 W/ 250 OVERRIDE (IP): Mod: JZ | Performed by: SURGERY

## 2025-07-21 PROCEDURE — 83735 ASSAY OF MAGNESIUM: CPT

## 2025-07-21 PROCEDURE — 92523 SPEECH SOUND LANG COMPREHEN: CPT

## 2025-07-21 RX ORDER — GABAPENTIN 100 MG/1
100 CAPSULE ORAL 3 TIMES DAILY
Status: DISCONTINUED | OUTPATIENT
Start: 2025-07-21 | End: 2025-07-22 | Stop reason: HOSPADM

## 2025-07-21 RX ORDER — MAGNESIUM SULFATE HEPTAHYDRATE 40 MG/ML
2 INJECTION, SOLUTION INTRAVENOUS ONCE
Status: COMPLETED | OUTPATIENT
Start: 2025-07-21 | End: 2025-07-21

## 2025-07-21 RX ORDER — LIDOCAINE 4 G/G
1 PATCH TOPICAL DAILY
Status: DISCONTINUED | OUTPATIENT
Start: 2025-07-21 | End: 2025-07-22 | Stop reason: HOSPADM

## 2025-07-21 RX ORDER — POTASSIUM CHLORIDE 1500 MG/1
40 TABLET, EXTENDED RELEASE ORAL 2 TIMES DAILY
Status: COMPLETED | OUTPATIENT
Start: 2025-07-21 | End: 2025-07-21

## 2025-07-21 RX ORDER — METAXALONE 800 MG/1
400 TABLET ORAL 3 TIMES DAILY
Status: DISCONTINUED | OUTPATIENT
Start: 2025-07-21 | End: 2025-07-22 | Stop reason: HOSPADM

## 2025-07-21 RX ORDER — TRAZODONE HYDROCHLORIDE 50 MG/1
150 TABLET ORAL NIGHTLY
Status: DISCONTINUED | OUTPATIENT
Start: 2025-07-21 | End: 2025-07-22 | Stop reason: HOSPADM

## 2025-07-21 RX ADMIN — ACETAMINOPHEN 1000 MG: 500 TABLET ORAL at 19:59

## 2025-07-21 RX ADMIN — DOCUSATE SODIUM 100 MG: 100 CAPSULE, LIQUID FILLED ORAL at 17:22

## 2025-07-21 RX ADMIN — DOCUSATE SODIUM 50 MG AND SENNOSIDES 8.6 MG 1 TABLET: 8.6; 5 TABLET, FILM COATED ORAL at 20:03

## 2025-07-21 RX ADMIN — HYDROMORPHONE HYDROCHLORIDE 0.5 MG: 1 INJECTION, SOLUTION INTRAMUSCULAR; INTRAVENOUS; SUBCUTANEOUS at 05:48

## 2025-07-21 RX ADMIN — ENOXAPARIN SODIUM 40 MG: 100 INJECTION SUBCUTANEOUS at 17:22

## 2025-07-21 RX ADMIN — OXYCODONE HYDROCHLORIDE 10 MG: 10 TABLET ORAL at 14:08

## 2025-07-21 RX ADMIN — OXYCODONE HYDROCHLORIDE 10 MG: 10 TABLET ORAL at 09:48

## 2025-07-21 RX ADMIN — Medication 1 EACH: at 17:22

## 2025-07-21 RX ADMIN — CELECOXIB 200 MG: 200 CAPSULE ORAL at 05:48

## 2025-07-21 RX ADMIN — MAGNESIUM SULFATE HEPTAHYDRATE 2 G: 2 INJECTION, SOLUTION INTRAVENOUS at 08:15

## 2025-07-21 RX ADMIN — HYDROMORPHONE HYDROCHLORIDE 0.5 MG: 1 INJECTION, SOLUTION INTRAMUSCULAR; INTRAVENOUS; SUBCUTANEOUS at 02:30

## 2025-07-21 RX ADMIN — Medication 1 EACH: at 04:39

## 2025-07-21 RX ADMIN — TRAZODONE HYDROCHLORIDE 150 MG: 50 TABLET ORAL at 22:01

## 2025-07-21 RX ADMIN — GABAPENTIN 100 MG: 100 CAPSULE ORAL at 19:59

## 2025-07-21 RX ADMIN — ACETAMINOPHEN 1000 MG: 500 TABLET ORAL at 14:03

## 2025-07-21 RX ADMIN — METAXALONE 400 MG: 800 TABLET ORAL at 20:00

## 2025-07-21 RX ADMIN — ENOXAPARIN SODIUM 40 MG: 100 INJECTION SUBCUTANEOUS at 05:48

## 2025-07-21 RX ADMIN — GABAPENTIN 100 MG: 100 CAPSULE ORAL at 14:03

## 2025-07-21 RX ADMIN — SERTRALINE 25 MG: 50 TABLET, FILM COATED ORAL at 09:56

## 2025-07-21 RX ADMIN — ACETAMINOPHEN 1000 MG: 500 TABLET ORAL at 00:32

## 2025-07-21 RX ADMIN — OXYCODONE HYDROCHLORIDE 10 MG: 10 TABLET ORAL at 01:03

## 2025-07-21 RX ADMIN — OXYCODONE HYDROCHLORIDE 10 MG: 10 TABLET ORAL at 04:39

## 2025-07-21 RX ADMIN — METAXALONE 400 MG: 800 TABLET ORAL at 14:03

## 2025-07-21 RX ADMIN — HYDROMORPHONE HYDROCHLORIDE 0.5 MG: 1 INJECTION, SOLUTION INTRAMUSCULAR; INTRAVENOUS; SUBCUTANEOUS at 17:29

## 2025-07-21 RX ADMIN — ACETAMINOPHEN 1000 MG: 500 TABLET ORAL at 05:48

## 2025-07-21 RX ADMIN — POTASSIUM CHLORIDE 40 MEQ: 1500 TABLET, EXTENDED RELEASE ORAL at 09:48

## 2025-07-21 RX ADMIN — SODIUM CHLORIDE, POTASSIUM CHLORIDE, SODIUM LACTATE AND CALCIUM CHLORIDE: 600; 310; 30; 20 INJECTION, SOLUTION INTRAVENOUS at 03:32

## 2025-07-21 ASSESSMENT — PAIN DESCRIPTION - PAIN TYPE
TYPE: ACUTE PAIN

## 2025-07-21 ASSESSMENT — ENCOUNTER SYMPTOMS
NEUROLOGICAL NEGATIVE: 1
CARDIOVASCULAR NEGATIVE: 1
DIZZINESS: 0
BACK PAIN: 1
DOUBLE VISION: 0
PSYCHIATRIC NEGATIVE: 1
CONSTITUTIONAL NEGATIVE: 1
BLURRED VISION: 0
EYES NEGATIVE: 1
RESPIRATORY NEGATIVE: 1
MYALGIAS: 1
GASTROINTESTINAL NEGATIVE: 1
FOCAL WEAKNESS: 0

## 2025-07-21 ASSESSMENT — COGNITIVE AND FUNCTIONAL STATUS - GENERAL
DAILY ACTIVITIY SCORE: 23
MOVING TO AND FROM BED TO CHAIR: A LITTLE
SUGGESTED CMS G CODE MODIFIER DAILY ACTIVITY: CI
SUGGESTED CMS G CODE MODIFIER DAILY ACTIVITY: CH
MOBILITY SCORE: 19
WALKING IN HOSPITAL ROOM: A LITTLE
TURNING FROM BACK TO SIDE WHILE IN FLAT BAD: A LITTLE
SUGGESTED CMS G CODE MODIFIER MOBILITY: CK
WALKING IN HOSPITAL ROOM: A LITTLE
SUGGESTED CMS G CODE MODIFIER MOBILITY: CJ
MOBILITY SCORE: 22
HELP NEEDED FOR BATHING: A LITTLE
DAILY ACTIVITIY SCORE: 24
STANDING UP FROM CHAIR USING ARMS: A LITTLE
CLIMB 3 TO 5 STEPS WITH RAILING: A LITTLE
CLIMB 3 TO 5 STEPS WITH RAILING: A LITTLE

## 2025-07-21 ASSESSMENT — LIFESTYLE VARIABLES
AVERAGE NUMBER OF DAYS PER WEEK YOU HAVE A DRINK CONTAINING ALCOHOL: 0
EVER FELT BAD OR GUILTY ABOUT YOUR DRINKING: NO
HAVE PEOPLE ANNOYED YOU BY CRITICIZING YOUR DRINKING: NO
TOTAL SCORE: 0
HOW MANY TIMES IN THE PAST YEAR HAVE YOU HAD 5 OR MORE DRINKS IN A DAY: 0
TOTAL SCORE: 0
ON A TYPICAL DAY WHEN YOU DRINK ALCOHOL HOW MANY DRINKS DO YOU HAVE: 4
TOTAL SCORE: 0
HAVE YOU EVER FELT YOU SHOULD CUT DOWN ON YOUR DRINKING: NO
DOES PATIENT WANT TO STOP DRINKING: NO
ALCOHOL_USE: YES
EVER HAD A DRINK FIRST THING IN THE MORNING TO STEADY YOUR NERVES TO GET RID OF A HANGOVER: NO
CONSUMPTION TOTAL: NEGATIVE

## 2025-07-21 ASSESSMENT — COPD QUESTIONNAIRES
DURING THE PAST 4 WEEKS HOW MUCH DID YOU FEEL SHORT OF BREATH: NONE/LITTLE OF THE TIME
COPD SCREENING SCORE: 2
HAVE YOU SMOKED AT LEAST 100 CIGARETTES IN YOUR ENTIRE LIFE: YES
DO YOU EVER COUGH UP ANY MUCUS OR PHLEGM?: NO/ONLY WITH OCCASIONAL COLDS OR INFECTIONS

## 2025-07-21 ASSESSMENT — GAIT ASSESSMENTS
GAIT LEVEL OF ASSIST: SUPERVISED
DEVIATION: ANTALGIC
DISTANCE (FEET): 300

## 2025-07-21 ASSESSMENT — SOCIAL DETERMINANTS OF HEALTH (SDOH)
WITHIN THE LAST YEAR, HAVE YOU BEEN KICKED, HIT, SLAPPED, OR OTHERWISE PHYSICALLY HURT BY YOUR PARTNER OR EX-PARTNER?: NO
WITHIN THE LAST YEAR, HAVE TO BEEN RAPED OR FORCED TO HAVE ANY KIND OF SEXUAL ACTIVITY BY YOUR PARTNER OR EX-PARTNER?: NO
WITHIN THE PAST 12 MONTHS, YOU WORRIED THAT YOUR FOOD WOULD RUN OUT BEFORE YOU GOT THE MONEY TO BUY MORE: NEVER TRUE
WITHIN THE PAST 12 MONTHS, THE FOOD YOU BOUGHT JUST DIDN'T LAST AND YOU DIDN'T HAVE MONEY TO GET MORE: NEVER TRUE
IN THE PAST 12 MONTHS, HAS THE ELECTRIC, GAS, OIL, OR WATER COMPANY THREATENED TO SHUT OFF SERVICE IN YOUR HOME?: NO
WITHIN THE LAST YEAR, HAVE YOU BEEN HUMILIATED OR EMOTIONALLY ABUSED IN OTHER WAYS BY YOUR PARTNER OR EX-PARTNER?: NO
WITHIN THE LAST YEAR, HAVE YOU BEEN AFRAID OF YOUR PARTNER OR EX-PARTNER?: NO

## 2025-07-21 ASSESSMENT — ACTIVITIES OF DAILY LIVING (ADL): TOILETING: INDEPENDENT

## 2025-07-21 NOTE — ED NOTES
Patient medicated per MAR, resting comfortably at this time. NO further needs. Call light in reach.

## 2025-07-21 NOTE — ED NOTES
Med  rec updated and complete.   Allergies reviewed.       Pt is unable to participate in an interview.    Per chart set to merge  name is Ángel yLn 1985. Pt has filled at [facility].  Called [facility] [phone]. Last active profile located 06/2024. No medications added to med rec.

## 2025-07-21 NOTE — ASSESSMENT & PLAN NOTE
Per pt takes Zoloft, Neurontin, clonidine, nasal ketamine and trazodone.  7/21 Resumed zoloft and trazodone.
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
## 2025-07-21 NOTE — ASSESSMENT & PLAN NOTE
GCS 14 with agitation on arrival.  Repetitive.   Versed and Haldol with good effects.  CT head negative.  Cognitive eval completed, no further injury.
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
## 2025-07-21 NOTE — ED NOTES
Patient cleared of c-spine precautions by ERP and OK to take of c-collar. Patient OK to drink water per ERP. Provided to patient.
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
## 2025-07-21 NOTE — ASSESSMENT & PLAN NOTE
Acute fractures of the left transverse processes of the L1, L2, L3 and L4 vertebral bodies and the bilateral transverse processes of the L5 vertebral body, acute fracture of the inferior endplate of the L5 vertebral body. No retropulsion of fracture fragments and acute fracture of the superior facet of the first sacral segment.

## 2025-07-21 NOTE — ED NOTES
Patient medicated per MAR, forehead abrasion cleaned and polysporin placed. Wound covered with bandaid.
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
## 2025-07-21 NOTE — ED NOTES
Bedside report received from Trauma [facility]. Patient placed on monitor and bed alarm. Call light in reach.

## 2025-07-21 NOTE — CARE PLAN
The patient is Stable - Low risk of patient condition declining or worsening    Shift Goals  Clinical Goals: pain control, safety, updates  Patient Goals: pain control, sleep, updates  Family Goals: updates    Progress made toward(s) clinical / shift goals:  Pt medicated per MAR, resting. Pain controlled through orders. Bed alarm in place for safety, pt calls appropriately. Updated pt and family on POC.       Problem: Pain - Standard  Goal: Alleviation of pain or a reduction in pain to the patient’s comfort goal  Outcome: Progressing     Problem: Knowledge Deficit - Standard  Goal: Patient and family/care givers will demonstrate understanding of plan of care, disease process/condition, diagnostic tests and medications  Outcome: Progressing     Problem: Fall Risk  Goal: Patient will remain free from falls  Outcome: Progressing       Patient is not progressing towards the following goals:

## 2025-07-21 NOTE — CARE PLAN
The patient is Stable - Low risk of patient condition declining or worsening    Shift Goals  Clinical Goals: Pain Mgt/ Ambulation  Patient Goals: Pain Mgt/ Ambulation  Family Goals: updates    Progress made toward(s) clinical / shift goals:        Problem: Pain - Standard  Goal: Alleviation of pain or a reduction in pain to the patient’s comfort goal  Description: Target End Date:  Prior to discharge or change in level of care    Document on Vitals flowsheet    1.  Document pain using the appropriate pain scale per order or unit policy  2.  Educate and implement non-pharmacologic comfort measures (i.e. relaxation, distraction, massage, cold/heat therapy, etc.)  3.  Pain management medications as ordered  4.  Reassess pain after pain med administration per policy  5.  If opiods administered assess patient's response to pain medication is appropriate per POSS sedation scale  6.  Follow pain management plan developed in collaboration with patient and interdisciplinary team (including palliative care or pain specialists if applicable)  Outcome: Progressing     Problem: Knowledge Deficit - Standard  Goal: Patient and family/care givers will demonstrate understanding of plan of care, disease process/condition, diagnostic tests and medications  Description: Target End Date:  1-3 days or as soon as patient condition allows    Document in Patient Education    1.  Patient and family/caregiver oriented to unit, equipment, visitation policy and means for communicating concern  2.  Complete/review Learning Assessment  3.  Assess knowledge level of disease process/condition, treatment plan, diagnostic tests and medications  4.  Explain disease process/condition, treatment plan, diagnostic tests and medications  Outcome: Progressing     Problem: Fall Risk  Goal: Patient will remain free from falls  Description: Target End Date:  Prior to discharge or change in level of care    Document interventions on the [facility] Fall Risk  Assessment    1.  Assess for fall risk factors  2.  Implement fall precautions  Outcome: Progressing

## 2025-07-21 NOTE — PROGRESS NOTES
Bedside report received, assessment completed    A&O x  4, pt calls appropriately  Mobility: Up self, Assistive Devices: no assistive devices   6 Clicks:  Mobility/ PT following   ADL/ OT following    Weight Bearing Restrictions: no restrictions   Fall Risk Assessment: no risk   Fall Precautions Include: + Personal Items at bedside, + Bed in low position, + Door Notifications in place   Pain Assessment / Reassessment completed, medication provided per MAR  Diet: Regular   - Tolerating  LDA:   IV Access: 18g BL, CDI/ flushed/ SL/ Infusing per MAR   GI/: restroom void, + flatus, 7/20 BM    - Bowel protocols in place: +  DVT Prophylaxis: lovenox, SCD +   Alberto Score: 22, Interventions per flow sheet   Skin Assessment: completed upon assessment   POC:     - PT/OT evaluation    - Home when medically cleared   D/C Plan:    - Home     Reviewed plan of care with patient, bed in lowest position and locked, pt resting comfortably now, call light within reach, all needs met at this time. Interventions will be executed per plan of care

## 2025-07-22 ENCOUNTER — PHARMACY VISIT (OUTPATIENT)
Dept: PHARMACY | Facility: MEDICAL CENTER | Age: 40
End: 2025-07-22
Payer: COMMERCIAL

## 2025-07-22 VITALS
BODY MASS INDEX: 25.47 KG/M2 | RESPIRATION RATE: 18 BRPM | WEIGHT: 188 LBS | HEIGHT: 72 IN | TEMPERATURE: 98.1 F | SYSTOLIC BLOOD PRESSURE: 104 MMHG | DIASTOLIC BLOOD PRESSURE: 59 MMHG | OXYGEN SATURATION: 94 % | HEART RATE: 66 BPM

## 2025-07-22 PROBLEM — Z76.89 ENCOUNTER FOR PSYCHIATRIC ASSESSMENT: Status: ACTIVE | Noted: 2025-07-22

## 2025-07-22 LAB
ANION GAP SERPL CALC-SCNC: 12 MMOL/L (ref 7–16)
BASOPHILS # BLD AUTO: 0.4 % (ref 0–1.8)
BASOPHILS # BLD: 0.03 K/UL (ref 0–0.12)
BUN SERPL-MCNC: 9 MG/DL (ref 8–22)
CALCIUM SERPL-MCNC: 8.3 MG/DL (ref 8.5–10.5)
CHLORIDE SERPL-SCNC: 108 MMOL/L (ref 96–112)
CO2 SERPL-SCNC: 20 MMOL/L (ref 20–33)
CREAT SERPL-MCNC: 0.75 MG/DL (ref 0.5–1.4)
EOSINOPHIL # BLD AUTO: 0.12 K/UL (ref 0–0.51)
EOSINOPHIL NFR BLD: 1.7 % (ref 0–6.9)
ERYTHROCYTE [DISTWIDTH] IN BLOOD BY AUTOMATED COUNT: 44.5 FL (ref 35.9–50)
GFR SERPLBLD CREATININE-BSD FMLA CKD-EPI: 117 ML/MIN/1.73 M 2
GLUCOSE SERPL-MCNC: 90 MG/DL (ref 65–99)
HCT VFR BLD AUTO: 37.2 % (ref 42–52)
HGB BLD-MCNC: 12.8 G/DL (ref 14–18)
IMM GRANULOCYTES # BLD AUTO: 0.03 K/UL (ref 0–0.11)
IMM GRANULOCYTES NFR BLD AUTO: 0.4 % (ref 0–0.9)
LYMPHOCYTES # BLD AUTO: 2.42 K/UL (ref 1–4.8)
LYMPHOCYTES NFR BLD: 34.2 % (ref 22–41)
MCH RBC QN AUTO: 31.8 PG (ref 27–33)
MCHC RBC AUTO-ENTMCNC: 34.4 G/DL (ref 32.3–36.5)
MCV RBC AUTO: 92.5 FL (ref 81.4–97.8)
MONOCYTES # BLD AUTO: 0.53 K/UL (ref 0–0.85)
MONOCYTES NFR BLD AUTO: 7.5 % (ref 0–13.4)
NEUTROPHILS # BLD AUTO: 3.95 K/UL (ref 1.82–7.42)
NEUTROPHILS NFR BLD: 55.8 % (ref 44–72)
NRBC # BLD AUTO: 0 K/UL
NRBC BLD-RTO: 0 /100 WBC (ref 0–0.2)
PLATELET # BLD AUTO: 185 K/UL (ref 164–446)
PMV BLD AUTO: 9.5 FL (ref 9–12.9)
POTASSIUM SERPL-SCNC: 3.6 MMOL/L (ref 3.6–5.5)
RBC # BLD AUTO: 4.02 M/UL (ref 4.7–6.1)
SODIUM SERPL-SCNC: 140 MMOL/L (ref 135–145)
WBC # BLD AUTO: 7.1 K/UL (ref 4.8–10.8)

## 2025-07-22 PROCEDURE — 700102 HCHG RX REV CODE 250 W/ 637 OVERRIDE(OP): Performed by: SURGERY

## 2025-07-22 PROCEDURE — 700102 HCHG RX REV CODE 250 W/ 637 OVERRIDE(OP): Performed by: REGISTERED NURSE

## 2025-07-22 PROCEDURE — 80048 BASIC METABOLIC PNL TOTAL CA: CPT

## 2025-07-22 PROCEDURE — 85025 COMPLETE CBC W/AUTO DIFF WBC: CPT

## 2025-07-22 PROCEDURE — 700101 HCHG RX REV CODE 250: Performed by: REGISTERED NURSE

## 2025-07-22 PROCEDURE — 700101 HCHG RX REV CODE 250: Performed by: SURGERY

## 2025-07-22 PROCEDURE — RXMED WILLOW AMBULATORY MEDICATION CHARGE: Performed by: REGISTERED NURSE

## 2025-07-22 PROCEDURE — A9270 NON-COVERED ITEM OR SERVICE: HCPCS | Performed by: SURGERY

## 2025-07-22 PROCEDURE — A9270 NON-COVERED ITEM OR SERVICE: HCPCS | Performed by: REGISTERED NURSE

## 2025-07-22 RX ORDER — GABAPENTIN 100 MG/1
100 CAPSULE ORAL 3 TIMES DAILY
Qty: 21 CAPSULE | Refills: 0 | Status: SHIPPED | OUTPATIENT
Start: 2025-07-22 | End: 2025-07-29

## 2025-07-22 RX ORDER — ACETAMINOPHEN 500 MG
1000 TABLET ORAL EVERY 6 HOURS
COMMUNITY
Start: 2025-07-22 | End: 2025-07-29

## 2025-07-22 RX ORDER — METAXALONE 800 MG/1
400-800 TABLET ORAL 3 TIMES DAILY PRN
Qty: 10 TABLET | Refills: 0 | Status: SHIPPED | OUTPATIENT
Start: 2025-07-22 | End: 2025-07-29

## 2025-07-22 RX ORDER — OXYCODONE HYDROCHLORIDE 5 MG/1
5 TABLET ORAL EVERY 6 HOURS PRN
Qty: 10 TABLET | Refills: 0 | Status: SHIPPED | OUTPATIENT
Start: 2025-07-22 | End: 2025-07-29

## 2025-07-22 RX ORDER — CELECOXIB 200 MG/1
200 CAPSULE ORAL DAILY
Qty: 7 CAPSULE | Refills: 0 | Status: SHIPPED | OUTPATIENT
Start: 2025-07-23 | End: 2025-07-30

## 2025-07-22 RX ADMIN — ACETAMINOPHEN 1000 MG: 500 TABLET ORAL at 05:54

## 2025-07-22 RX ADMIN — ACETAMINOPHEN 1000 MG: 500 TABLET ORAL at 12:26

## 2025-07-22 RX ADMIN — Medication 1 EACH: at 05:56

## 2025-07-22 RX ADMIN — GABAPENTIN 100 MG: 100 CAPSULE ORAL at 05:53

## 2025-07-22 RX ADMIN — ACETAMINOPHEN 1000 MG: 500 TABLET ORAL at 00:17

## 2025-07-22 RX ADMIN — LIDOCAINE 1 PATCH: 4 PATCH TOPICAL at 05:56

## 2025-07-22 RX ADMIN — METAXALONE 400 MG: 800 TABLET ORAL at 12:26

## 2025-07-22 RX ADMIN — OXYCODONE 5 MG: 5 TABLET ORAL at 00:18

## 2025-07-22 RX ADMIN — GABAPENTIN 100 MG: 100 CAPSULE ORAL at 12:26

## 2025-07-22 RX ADMIN — SERTRALINE 25 MG: 50 TABLET, FILM COATED ORAL at 05:54

## 2025-07-22 RX ADMIN — CELECOXIB 200 MG: 200 CAPSULE ORAL at 05:53

## 2025-07-22 RX ADMIN — METAXALONE 400 MG: 800 TABLET ORAL at 05:53

## 2025-07-22 RX ADMIN — OXYCODONE 5 MG: 5 TABLET ORAL at 05:55

## 2025-07-22 ASSESSMENT — PAIN DESCRIPTION - PAIN TYPE
TYPE: ACUTE PAIN

## 2025-07-22 NOTE — CARE PLAN
The patient is Stable - Low risk of patient condition declining or worsening    Shift Goals  Clinical Goals: Pain management, safety, POC  Patient Goals: Pain management, discharge  Family Goals: Not present    Progress made toward(s) clinical / shift goals:  Patient pain being managed via pharmacological interventions per MAR. Patient calls appropriately for assistance. Patient educated on plan of care. Patient educated on possible discharge tomorrow.      Problem: Pain - Standard  Goal: Alleviation of pain or a reduction in pain to the patient’s comfort goal  Outcome: Progressing     Problem: Safety  Goal: Will remain free from injury  Outcome: Progressing     Problem: Knowledge Deficit  Goal: Knowledge of disease process/condition, treatment plan, diagnostic tests, and medications will improve  Outcome: Progressing
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
## 2025-07-22 NOTE — PROGRESS NOTES
Bedside report received, assessment completed    A&O x  4, pt calls appropriately  Mobility: Up self, Assistive Devices: no assistive devices   6 Clicks:  Mobility/ PT following   ADL/ OT following    Weight Bearing Restrictions: no restrictions   Fall Risk Assessment: no risk   Fall Precautions Include: + Personal Items at bedside, + Bed in low position, + Door Notifications in place   Pain Assessment / Reassessment completed, medication provided per MAR  Diet: Regular   - Tolerating  LDA:   IV Access: 18g BL, CDI/ flushed/ SL/ Infusing per MAR   GI/: restroom void, + flatus, 7/20 BM    - Bowel protocols in place: +  DVT Prophylaxis: lovenox, SCD +   Alberto Score: 22, Interventions per flow sheet   Skin Assessment: completed upon assessment   POC:     - PT/OT evaluation    - Home when medically cleared   D/C Plan:    - Pt requesting help with transportation home      Reviewed plan of care with patient, bed in lowest position and locked, pt resting comfortably now, call light within reach, all needs met at this time. Interventions will be executed per plan of care

## 2025-07-22 NOTE — PROGRESS NOTES
Bedside report received.  Assessment complete.  A&O x 4. Patient calls appropriately.  Patient ambulates independently/standby assist. Bed alarm off.   Patient on RA.  Patient has 5/10 pain. Patient medicated per MAR.  Denies N&V. Tolerating regular diet.  + void, + flatus, last BM 7/20.  Patient denies SOB.  SCD's refused.  Patient sitting up in bed watching TV.  Reviewed plan with of care with patient. Call light and personal belongings within reach. Hourly rounding in place. All needs met at this time.    /53   Pulse 69   Temp 36.6 °C (97.8 °F) (Temporal)   Resp 18   Ht 1.829 m (6')   Wt 85.3 kg (188 lb)   SpO2 95%   BMI 25.50 kg/m²

## 2025-07-22 NOTE — PROGRESS NOTES
Ángel Lyn has been provided discharge instructions, to include follow up care, home medications, and activity/diet reviewed. Copy of discharge instructions in patient chart, signed and reviewed. Patient verbalizes the understanding of the discharge instructions. PIV was removed prior to coming to [facility]. Arm band removed. Patient did not have home meds during admit. Meds to Beds verified and given to pt. Red tamper seal intact on controlled medication. Questions and concerns addressed prior to leaving the discharge lounge. Transported via car, accompanied by friend. Patient discharge to home.

## 2025-07-22 NOTE — PROGRESS NOTES
Virtual Nurse rounding complete.    Round Needs: Other: Patient asked if he could get some assistance with tracking down his car so that when it is time to d/c he can retrieve his belongings. Case management order placed.    PDI 32
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
## 2025-07-22 NOTE — CARE PLAN
The patient is Stable - Low risk of patient condition declining or worsening    Shift Goals  Clinical Goals: Discharge Home  Patient Goals: Discharge Home  Family Goals: Not present    Progress made toward(s) clinical / shift goals:       Problem: Pain - Standard  Goal: Alleviation of pain or a reduction in pain to the patient’s comfort goal  Description: Target End Date:  Prior to discharge or change in level of care    Document on Vitals flowsheet    1.  Document pain using the appropriate pain scale per order or unit policy  2.  Educate and implement non-pharmacologic comfort measures (i.e. relaxation, distraction, massage, cold/heat therapy, etc.)  3.  Pain management medications as ordered  4.  Reassess pain after pain med administration per policy  5.  If opiods administered assess patient's response to pain medication is appropriate per POSS sedation scale  6.  Follow pain management plan developed in collaboration with patient and interdisciplinary team (including palliative care or pain specialists if applicable)  Outcome: Progressing     Problem: Knowledge Deficit - Standard  Goal: Patient and family/care givers will demonstrate understanding of plan of care, disease process/condition, diagnostic tests and medications  Description: Target End Date:  1-3 days or as soon as patient condition allows    Document in Patient Education    1.  Patient and family/caregiver oriented to unit, equipment, visitation policy and means for communicating concern  2.  Complete/review Learning Assessment  3.  Assess knowledge level of disease process/condition, treatment plan, diagnostic tests and medications  4.  Explain disease process/condition, treatment plan, diagnostic tests and medications  Outcome: Progressing     Problem: Fall Risk  Goal: Patient will remain free from falls  Description: Target End Date:  Prior to discharge or change in level of care    Document interventions on the [facility] Fall Risk  Assessment    1.  Assess for fall risk factors  2.  Implement fall precautions  Outcome: Progressing     Problem: Communication  Goal: The ability to communicate needs accurately and effectively will improve  Outcome: Progressing

## 2025-07-22 NOTE — DISCHARGE INSTRUCTIONS
- Call or seek medical attention for questions or concerns  - Follow up with the [facility] RETURN: as needed  - Follow up with primary care provider within one weeks time  - Resume regular diet  - May take over the counter acetaminophen  - Continue daily over the counter stool softener while on narcotics  - No operation of machinery or motorized vehicles while under the influence of narcotics  - No alcohol, marijuana or illicit drug use while under the influence of narcotics  - In the event of a narcotic overdose naloxone (Narcan) is available without a prescription from any [facility] or [facility]  - No swimming, hot tubs, baths or wound submersion until cleared by outpatient provider. May shower  - No contact sports, strenuous activities, or heavy lifting until cleared by outpatient provider  - If respiratory decompensation, persistent or worsening pain, or signs or symptoms of infection occur seek medical attention

## 2025-07-24 LAB — COMPONENT CELLULAR 8504CLL: NORMAL

## 2025-08-07 ENCOUNTER — HOSPITAL ENCOUNTER (OUTPATIENT)
Dept: RADIOLOGY | Facility: MEDICAL CENTER | Age: 40
End: 2025-08-07
Payer: COMMERCIAL

## 2025-08-07 ENCOUNTER — HOSPITAL ENCOUNTER (INPATIENT)
Facility: MEDICAL CENTER | Age: 40
LOS: 3 days | DRG: 064 | End: 2025-08-10
Attending: EMERGENCY MEDICINE | Admitting: STUDENT IN AN ORGANIZED HEALTH CARE EDUCATION/TRAINING PROGRAM
Payer: COMMERCIAL

## 2025-08-07 DIAGNOSIS — S12.600A CLOSED DISPLACED FRACTURE OF SEVENTH CERVICAL VERTEBRA, UNSPECIFIED FRACTURE MORPHOLOGY, INITIAL ENCOUNTER (HCC): ICD-10-CM

## 2025-08-07 DIAGNOSIS — I62.9 INTRACRANIAL HEMORRHAGE (HCC): Primary | ICD-10-CM

## 2025-08-07 DIAGNOSIS — G08 DURAL SINUS THROMBOSIS: ICD-10-CM

## 2025-08-07 DIAGNOSIS — S22.41XA: ICD-10-CM

## 2025-08-07 PROBLEM — I61.9 INTRAPARENCHYMAL HEMORRHAGE OF BRAIN (HCC): Status: ACTIVE | Noted: 2025-08-07

## 2025-08-07 PROBLEM — S32.009A LUMBAR TRANSVERSE PROCESS FRACTURE (HCC): Status: ACTIVE | Noted: 2025-08-07

## 2025-08-07 LAB
ABO GROUP BLD: NORMAL
ALBUMIN SERPL BCP-MCNC: 4.2 G/DL (ref 3.2–4.9)
ALBUMIN/GLOB SERPL: 1.7 G/DL
ALP SERPL-CCNC: 131 U/L (ref 30–99)
ALT SERPL-CCNC: 16 U/L (ref 2–50)
ANION GAP SERPL CALC-SCNC: 11 MMOL/L (ref 7–16)
APTT PPP: 25 SEC (ref 24.7–36)
AST SERPL-CCNC: 21 U/L (ref 12–45)
BASOPHILS # BLD AUTO: 0.3 % (ref 0–1.8)
BASOPHILS # BLD: 0.01 K/UL (ref 0–0.12)
BILIRUB SERPL-MCNC: 0.3 MG/DL (ref 0.1–1.5)
BLD GP AB SCN SERPL QL: NORMAL
BUN SERPL-MCNC: 9 MG/DL (ref 8–22)
CALCIUM ALBUM COR SERPL-MCNC: 8.6 MG/DL (ref 8.5–10.5)
CALCIUM SERPL-MCNC: 8.8 MG/DL (ref 8.5–10.5)
CFT BLD TEG: 4.7 MIN (ref 4.6–9.1)
CFT P HPASE BLD TEG: 4.3 MIN (ref 4.3–8.3)
CHLORIDE SERPL-SCNC: 109 MMOL/L (ref 96–112)
CLOT ANGLE BLD TEG: 74.3 DEGREES (ref 63–78)
CO2 SERPL-SCNC: 22 MMOL/L (ref 20–33)
CREAT SERPL-MCNC: 0.82 MG/DL (ref 0.5–1.4)
CT.EXTRINSIC BLD ROTEM: 1.2 MIN (ref 0.8–2.1)
EOSINOPHIL # BLD AUTO: 0.08 K/UL (ref 0–0.51)
EOSINOPHIL NFR BLD: 2.5 % (ref 0–6.9)
ERYTHROCYTE [DISTWIDTH] IN BLOOD BY AUTOMATED COUNT: 44.5 FL (ref 35.9–50)
GFR SERPLBLD CREATININE-BSD FMLA CKD-EPI: 114 ML/MIN/1.73 M 2
GLOBULIN SER CALC-MCNC: 2.5 G/DL (ref 1.9–3.5)
GLUCOSE SERPL-MCNC: 93 MG/DL (ref 65–99)
HCT VFR BLD AUTO: 35 % (ref 42–52)
HGB BLD-MCNC: 12.1 G/DL (ref 14–18)
IMM GRANULOCYTES # BLD AUTO: 0.01 K/UL (ref 0–0.11)
IMM GRANULOCYTES NFR BLD AUTO: 0.3 % (ref 0–0.9)
INR PPP: 1.07 (ref 0.87–1.13)
LYMPHOCYTES # BLD AUTO: 2.23 K/UL (ref 1–4.8)
LYMPHOCYTES NFR BLD: 69.9 % (ref 22–41)
MCF BLD TEG: 55.9 MM (ref 52–69)
MCF.PLATELET INHIB BLD ROTEM: 19.8 MM (ref 15–32)
MCH RBC QN AUTO: 32 PG (ref 27–33)
MCHC RBC AUTO-ENTMCNC: 34.6 G/DL (ref 32.3–36.5)
MCV RBC AUTO: 92.6 FL (ref 81.4–97.8)
MONOCYTES # BLD AUTO: 0.2 K/UL (ref 0–0.85)
MONOCYTES NFR BLD AUTO: 6.3 % (ref 0–13.4)
NEUTROPHILS # BLD AUTO: 0.66 K/UL (ref 1.82–7.42)
NEUTROPHILS NFR BLD: 20.7 % (ref 44–72)
NRBC # BLD AUTO: 0 K/UL
NRBC BLD-RTO: 0 /100 WBC (ref 0–0.2)
PA AA BLD-ACNC: 0 % (ref 0–11)
PA ADP BLD-ACNC: 7.4 % (ref 0–17)
PLATELET # BLD AUTO: 249 K/UL (ref 164–446)
PMV BLD AUTO: 9.3 FL (ref 9–12.9)
POTASSIUM SERPL-SCNC: 4 MMOL/L (ref 3.6–5.5)
PROT SERPL-MCNC: 6.7 G/DL (ref 6–8.2)
PROTHROMBIN TIME: 14 SEC (ref 12–14.6)
RBC # BLD AUTO: 3.78 M/UL (ref 4.7–6.1)
RH BLD: NORMAL
SODIUM SERPL-SCNC: 142 MMOL/L (ref 135–145)
TEG ALGORITHM TGALG: NORMAL
WBC # BLD AUTO: 3.2 K/UL (ref 4.8–10.8)

## 2025-08-07 PROCEDURE — 80053 COMPREHEN METABOLIC PANEL: CPT

## 2025-08-07 PROCEDURE — 85730 THROMBOPLASTIN TIME PARTIAL: CPT

## 2025-08-07 PROCEDURE — 36415 COLL VENOUS BLD VENIPUNCTURE: CPT

## 2025-08-07 PROCEDURE — A9270 NON-COVERED ITEM OR SERVICE: HCPCS | Performed by: STUDENT IN AN ORGANIZED HEALTH CARE EDUCATION/TRAINING PROGRAM

## 2025-08-07 PROCEDURE — 85384 FIBRINOGEN ACTIVITY: CPT | Mod: 91

## 2025-08-07 PROCEDURE — 86901 BLOOD TYPING SEROLOGIC RH(D): CPT

## 2025-08-07 PROCEDURE — 86850 RBC ANTIBODY SCREEN: CPT

## 2025-08-07 PROCEDURE — 85025 COMPLETE CBC W/AUTO DIFF WBC: CPT

## 2025-08-07 PROCEDURE — 700111 HCHG RX REV CODE 636 W/ 250 OVERRIDE (IP): Mod: JZ | Performed by: EMERGENCY MEDICINE

## 2025-08-07 PROCEDURE — 99223 1ST HOSP IP/OBS HIGH 75: CPT | Performed by: STUDENT IN AN ORGANIZED HEALTH CARE EDUCATION/TRAINING PROGRAM

## 2025-08-07 PROCEDURE — 85610 PROTHROMBIN TIME: CPT

## 2025-08-07 PROCEDURE — 99285 EMERGENCY DEPT VISIT HI MDM: CPT

## 2025-08-07 PROCEDURE — 85347 COAGULATION TIME ACTIVATED: CPT

## 2025-08-07 PROCEDURE — 770020 HCHG ROOM/CARE - TELE (206)

## 2025-08-07 PROCEDURE — 700102 HCHG RX REV CODE 250 W/ 637 OVERRIDE(OP): Performed by: STUDENT IN AN ORGANIZED HEALTH CARE EDUCATION/TRAINING PROGRAM

## 2025-08-07 PROCEDURE — A9270 NON-COVERED ITEM OR SERVICE: HCPCS | Performed by: EMERGENCY MEDICINE

## 2025-08-07 PROCEDURE — 86900 BLOOD TYPING SEROLOGIC ABO: CPT

## 2025-08-07 PROCEDURE — 96374 THER/PROPH/DIAG INJ IV PUSH: CPT

## 2025-08-07 PROCEDURE — 700102 HCHG RX REV CODE 250 W/ 637 OVERRIDE(OP): Performed by: EMERGENCY MEDICINE

## 2025-08-07 PROCEDURE — 85576 BLOOD PLATELET AGGREGATION: CPT | Mod: 91

## 2025-08-07 RX ORDER — TRAZODONE HYDROCHLORIDE 150 MG/1
75-150 TABLET ORAL
COMMUNITY
Start: 2025-08-04

## 2025-08-07 RX ORDER — DOCUSATE SODIUM 100 MG/1
100 CAPSULE, LIQUID FILLED ORAL ONCE
Status: COMPLETED | OUTPATIENT
Start: 2025-08-07 | End: 2025-08-07

## 2025-08-07 RX ORDER — DIPHENHYDRAMINE HYDROCHLORIDE 50 MG/ML
50 INJECTION, SOLUTION INTRAMUSCULAR; INTRAVENOUS ONCE
Status: COMPLETED | OUTPATIENT
Start: 2025-08-07 | End: 2025-08-07

## 2025-08-07 RX ORDER — PROMETHAZINE HYDROCHLORIDE 25 MG/1
12.5-25 SUPPOSITORY RECTAL EVERY 4 HOURS PRN
Status: DISCONTINUED | OUTPATIENT
Start: 2025-08-07 | End: 2025-08-08

## 2025-08-07 RX ORDER — OXYCODONE HYDROCHLORIDE 5 MG/1
5 TABLET ORAL EVERY 4 HOURS PRN
Status: ON HOLD | COMMUNITY
End: 2025-08-10

## 2025-08-07 RX ORDER — AMOXICILLIN 250 MG
2 CAPSULE ORAL EVERY EVENING
Status: DISCONTINUED | OUTPATIENT
Start: 2025-08-07 | End: 2025-08-08

## 2025-08-07 RX ORDER — PROCHLORPERAZINE EDISYLATE 5 MG/ML
5-10 INJECTION INTRAMUSCULAR; INTRAVENOUS EVERY 4 HOURS PRN
Status: DISCONTINUED | OUTPATIENT
Start: 2025-08-07 | End: 2025-08-08

## 2025-08-07 RX ORDER — POLYETHYLENE GLYCOL 3350 17 G/17G
1 POWDER, FOR SOLUTION ORAL
Status: DISCONTINUED | OUTPATIENT
Start: 2025-08-07 | End: 2025-08-08

## 2025-08-07 RX ORDER — SERTRALINE HYDROCHLORIDE 25 MG/1
25 TABLET, FILM COATED ORAL
COMMUNITY
Start: 2025-07-29

## 2025-08-07 RX ORDER — ESKETAMINE HYDROCHLORIDE 28 MG/.2ML
84 SOLUTION NASAL
COMMUNITY
Start: 2025-07-07

## 2025-08-07 RX ORDER — ONDANSETRON 4 MG/1
4 TABLET, ORALLY DISINTEGRATING ORAL EVERY 4 HOURS PRN
Status: DISCONTINUED | OUTPATIENT
Start: 2025-08-07 | End: 2025-08-10 | Stop reason: HOSPADM

## 2025-08-07 RX ORDER — ONDANSETRON 2 MG/ML
4 INJECTION INTRAMUSCULAR; INTRAVENOUS EVERY 4 HOURS PRN
Status: DISCONTINUED | OUTPATIENT
Start: 2025-08-07 | End: 2025-08-10 | Stop reason: HOSPADM

## 2025-08-07 RX ORDER — TRAZODONE HYDROCHLORIDE 150 MG/1
75-150 TABLET ORAL
Status: DISCONTINUED | OUTPATIENT
Start: 2025-08-07 | End: 2025-08-08

## 2025-08-07 RX ORDER — ACETAMINOPHEN 325 MG/1
650 TABLET ORAL EVERY 6 HOURS PRN
Status: DISCONTINUED | OUTPATIENT
Start: 2025-08-07 | End: 2025-08-08

## 2025-08-07 RX ORDER — HYDRALAZINE HYDROCHLORIDE 20 MG/ML
10 INJECTION INTRAMUSCULAR; INTRAVENOUS EVERY 4 HOURS PRN
Status: DISCONTINUED | OUTPATIENT
Start: 2025-08-07 | End: 2025-08-08

## 2025-08-07 RX ORDER — OXYCODONE HYDROCHLORIDE 5 MG/1
5 TABLET ORAL EVERY 4 HOURS PRN
Refills: 0 | Status: DISCONTINUED | OUTPATIENT
Start: 2025-08-07 | End: 2025-08-08

## 2025-08-07 RX ORDER — DIPHENHYDRAMINE HYDROCHLORIDE 50 MG/ML
25 INJECTION, SOLUTION INTRAMUSCULAR; INTRAVENOUS ONCE
Status: DISCONTINUED | OUTPATIENT
Start: 2025-08-07 | End: 2025-08-07

## 2025-08-07 RX ORDER — PROMETHAZINE HYDROCHLORIDE 25 MG/1
12.5-25 TABLET ORAL EVERY 4 HOURS PRN
Status: DISCONTINUED | OUTPATIENT
Start: 2025-08-07 | End: 2025-08-08

## 2025-08-07 RX ORDER — RIVAROXABAN 10 MG/1
10 TABLET, FILM COATED ORAL
Status: ON HOLD | COMMUNITY
Start: 2025-05-16 | End: 2025-08-10

## 2025-08-07 RX ADMIN — SENNOSIDES, DOCUSATE SODIUM 2 TABLET: 50; 8.6 TABLET, FILM COATED ORAL at 23:05

## 2025-08-07 RX ADMIN — ACETAMINOPHEN 650 MG: 325 TABLET ORAL at 23:20

## 2025-08-07 RX ADMIN — DOCUSATE SODIUM 100 MG: 100 CAPSULE, LIQUID FILLED ORAL at 18:53

## 2025-08-07 RX ADMIN — OXYCODONE 5 MG: 5 TABLET ORAL at 21:03

## 2025-08-07 RX ADMIN — DIPHENHYDRAMINE HYDROCHLORIDE 50 MG: 50 INJECTION, SOLUTION INTRAMUSCULAR; INTRAVENOUS at 18:57

## 2025-08-07 ASSESSMENT — LIFESTYLE VARIABLES
ALCOHOL_USE: NO
EVER HAD A DRINK FIRST THING IN THE MORNING TO STEADY YOUR NERVES TO GET RID OF A HANGOVER: NO
AVERAGE NUMBER OF DAYS PER WEEK YOU HAVE A DRINK CONTAINING ALCOHOL: 0
HAVE YOU EVER FELT YOU SHOULD CUT DOWN ON YOUR DRINKING: NO
CONSUMPTION TOTAL: NEGATIVE
EVER FELT BAD OR GUILTY ABOUT YOUR DRINKING: NO
ON A TYPICAL DAY WHEN YOU DRINK ALCOHOL HOW MANY DRINKS DO YOU HAVE: 0
DOES PATIENT WANT TO STOP DRINKING: NO
TOTAL SCORE: 0
TOTAL SCORE: 0
HOW MANY TIMES IN THE PAST YEAR HAVE YOU HAD 5 OR MORE DRINKS IN A DAY: 0
HAVE PEOPLE ANNOYED YOU BY CRITICIZING YOUR DRINKING: NO
TOTAL SCORE: 0

## 2025-08-07 ASSESSMENT — COGNITIVE AND FUNCTIONAL STATUS - GENERAL
MOBILITY SCORE: 24
SUGGESTED CMS G CODE MODIFIER DAILY ACTIVITY: CH
DAILY ACTIVITIY SCORE: 24
SUGGESTED CMS G CODE MODIFIER MOBILITY: CH

## 2025-08-07 ASSESSMENT — ENCOUNTER SYMPTOMS
VOMITING: 0
PHOTOPHOBIA: 0
DIARRHEA: 0
ABDOMINAL PAIN: 0
HEADACHES: 0
TINGLING: 1
FEVER: 0
BACK PAIN: 0
DOUBLE VISION: 0
ORTHOPNEA: 0
NAUSEA: 0
CHILLS: 0
PALPITATIONS: 0
EYE PAIN: 0
NECK PAIN: 0
DIZZINESS: 0

## 2025-08-07 ASSESSMENT — FIBROSIS 4 INDEX
FIB4 SCORE: 0.84
FIB4 SCORE: 0.49

## 2025-08-08 ENCOUNTER — APPOINTMENT (OUTPATIENT)
Dept: RADIOLOGY | Facility: MEDICAL CENTER | Age: 40
DRG: 064 | End: 2025-08-08
Attending: PSYCHIATRY & NEUROLOGY
Payer: COMMERCIAL

## 2025-08-08 PROBLEM — F15.90 STIMULANT USE DISORDER: Status: ACTIVE | Noted: 2025-08-08

## 2025-08-08 PROBLEM — G08 DURAL SINUS THROMBOSIS: Status: ACTIVE | Noted: 2025-08-08

## 2025-08-08 LAB
ALBUMIN SERPL BCP-MCNC: 3.9 G/DL (ref 3.2–4.9)
ALBUMIN/GLOB SERPL: 1.8 G/DL
ALP SERPL-CCNC: 124 U/L (ref 30–99)
ALT SERPL-CCNC: 14 U/L (ref 2–50)
AMPHET UR QL SCN: POSITIVE
ANION GAP SERPL CALC-SCNC: 12 MMOL/L (ref 7–16)
AST SERPL-CCNC: 21 U/L (ref 12–45)
BARBITURATES UR QL SCN: NEGATIVE
BENZODIAZ UR QL SCN: NEGATIVE
BILIRUB SERPL-MCNC: 0.2 MG/DL (ref 0.1–1.5)
BUN SERPL-MCNC: 10 MG/DL (ref 8–22)
BZE UR QL SCN: POSITIVE
CALCIUM ALBUM COR SERPL-MCNC: 8.6 MG/DL (ref 8.5–10.5)
CALCIUM SERPL-MCNC: 8.5 MG/DL (ref 8.5–10.5)
CANNABINOIDS UR QL SCN: POSITIVE
CHLORIDE SERPL-SCNC: 108 MMOL/L (ref 96–112)
CO2 SERPL-SCNC: 20 MMOL/L (ref 20–33)
CREAT SERPL-MCNC: 0.81 MG/DL (ref 0.5–1.4)
ERYTHROCYTE [DISTWIDTH] IN BLOOD BY AUTOMATED COUNT: 47.2 FL (ref 35.9–50)
FENTANYL UR QL: NEGATIVE
GFR SERPLBLD CREATININE-BSD FMLA CKD-EPI: 114 ML/MIN/1.73 M 2
GLOBULIN SER CALC-MCNC: 2.2 G/DL (ref 1.9–3.5)
GLUCOSE SERPL-MCNC: 87 MG/DL (ref 65–99)
HCT VFR BLD AUTO: 34 % (ref 42–52)
HGB BLD-MCNC: 11.6 G/DL (ref 14–18)
MCH RBC QN AUTO: 33 PG (ref 27–33)
MCHC RBC AUTO-ENTMCNC: 34.1 G/DL (ref 32.3–36.5)
MCV RBC AUTO: 96.9 FL (ref 81.4–97.8)
METHADONE UR QL SCN: NEGATIVE
OPIATES UR QL SCN: NEGATIVE
OXYCODONE UR QL SCN: POSITIVE
PCP UR QL SCN: NEGATIVE
PLATELET # BLD AUTO: 220 K/UL (ref 164–446)
PMV BLD AUTO: 9.3 FL (ref 9–12.9)
POTASSIUM SERPL-SCNC: 3.9 MMOL/L (ref 3.6–5.5)
PROPOXYPH UR QL SCN: NEGATIVE
PROT SERPL-MCNC: 6.1 G/DL (ref 6–8.2)
RBC # BLD AUTO: 3.51 M/UL (ref 4.7–6.1)
SODIUM SERPL-SCNC: 140 MMOL/L (ref 135–145)
WBC # BLD AUTO: 3.5 K/UL (ref 4.8–10.8)

## 2025-08-08 PROCEDURE — 700111 HCHG RX REV CODE 636 W/ 250 OVERRIDE (IP): Mod: JZ

## 2025-08-08 PROCEDURE — A9270 NON-COVERED ITEM OR SERVICE: HCPCS

## 2025-08-08 PROCEDURE — 70553 MRI BRAIN STEM W/O & W/DYE: CPT

## 2025-08-08 PROCEDURE — 700117 HCHG RX CONTRAST REV CODE 255: Mod: JZ | Performed by: PSYCHIATRY & NEUROLOGY

## 2025-08-08 PROCEDURE — 80053 COMPREHEN METABOLIC PANEL: CPT

## 2025-08-08 PROCEDURE — A9270 NON-COVERED ITEM OR SERVICE: HCPCS | Performed by: STUDENT IN AN ORGANIZED HEALTH CARE EDUCATION/TRAINING PROGRAM

## 2025-08-08 PROCEDURE — 80307 DRUG TEST PRSMV CHEM ANLYZR: CPT

## 2025-08-08 PROCEDURE — 700102 HCHG RX REV CODE 250 W/ 637 OVERRIDE(OP)

## 2025-08-08 PROCEDURE — 700111 HCHG RX REV CODE 636 W/ 250 OVERRIDE (IP): Performed by: STUDENT IN AN ORGANIZED HEALTH CARE EDUCATION/TRAINING PROGRAM

## 2025-08-08 PROCEDURE — 70544 MR ANGIOGRAPHY HEAD W/O DYE: CPT

## 2025-08-08 PROCEDURE — 85027 COMPLETE CBC AUTOMATED: CPT

## 2025-08-08 PROCEDURE — 99232 SBSQ HOSP IP/OBS MODERATE 35: CPT | Performed by: STUDENT IN AN ORGANIZED HEALTH CARE EDUCATION/TRAINING PROGRAM

## 2025-08-08 PROCEDURE — 700102 HCHG RX REV CODE 250 W/ 637 OVERRIDE(OP): Performed by: STUDENT IN AN ORGANIZED HEALTH CARE EDUCATION/TRAINING PROGRAM

## 2025-08-08 PROCEDURE — 770020 HCHG ROOM/CARE - TELE (206)

## 2025-08-08 PROCEDURE — 36415 COLL VENOUS BLD VENIPUNCTURE: CPT

## 2025-08-08 PROCEDURE — 99255 IP/OBS CONSLTJ NEW/EST HI 80: CPT | Performed by: PSYCHIATRY & NEUROLOGY

## 2025-08-08 PROCEDURE — A9579 GAD-BASE MR CONTRAST NOS,1ML: HCPCS | Mod: JZ | Performed by: PSYCHIATRY & NEUROLOGY

## 2025-08-08 RX ORDER — POLYETHYLENE GLYCOL 3350 17 G/17G
1 POWDER, FOR SOLUTION ORAL
Status: DISCONTINUED | OUTPATIENT
Start: 2025-08-08 | End: 2025-08-10 | Stop reason: HOSPADM

## 2025-08-08 RX ORDER — PROCHLORPERAZINE EDISYLATE 5 MG/ML
10 INJECTION INTRAMUSCULAR; INTRAVENOUS EVERY 6 HOURS PRN
Status: DISCONTINUED | OUTPATIENT
Start: 2025-08-08 | End: 2025-08-10 | Stop reason: HOSPADM

## 2025-08-08 RX ORDER — GADOTERIDOL 279.3 MG/ML
18 INJECTION INTRAVENOUS ONCE
Status: COMPLETED | OUTPATIENT
Start: 2025-08-08 | End: 2025-08-08

## 2025-08-08 RX ORDER — OXYCODONE HYDROCHLORIDE 10 MG/1
10 TABLET ORAL
Refills: 0 | Status: DISCONTINUED | OUTPATIENT
Start: 2025-08-08 | End: 2025-08-09

## 2025-08-08 RX ORDER — AMOXICILLIN 250 MG
2 CAPSULE ORAL NIGHTLY PRN
Status: DISCONTINUED | OUTPATIENT
Start: 2025-08-08 | End: 2025-08-10 | Stop reason: HOSPADM

## 2025-08-08 RX ORDER — LIDOCAINE 4 G/G
2 PATCH TOPICAL DAILY
Status: DISCONTINUED | OUTPATIENT
Start: 2025-08-09 | End: 2025-08-10 | Stop reason: HOSPADM

## 2025-08-08 RX ORDER — OXYCODONE HYDROCHLORIDE 5 MG/1
5 TABLET ORAL
Refills: 0 | Status: DISCONTINUED | OUTPATIENT
Start: 2025-08-08 | End: 2025-08-09

## 2025-08-08 RX ORDER — ACETAMINOPHEN 500 MG
1000 TABLET ORAL EVERY 6 HOURS PRN
Status: DISCONTINUED | OUTPATIENT
Start: 2025-08-08 | End: 2025-08-10 | Stop reason: HOSPADM

## 2025-08-08 RX ORDER — ENOXAPARIN SODIUM 100 MG/ML
1 INJECTION SUBCUTANEOUS EVERY 12 HOURS
Status: DISCONTINUED | OUTPATIENT
Start: 2025-08-08 | End: 2025-08-10 | Stop reason: HOSPADM

## 2025-08-08 RX ORDER — HYDROMORPHONE HYDROCHLORIDE 1 MG/ML
0.5 INJECTION, SOLUTION INTRAMUSCULAR; INTRAVENOUS; SUBCUTANEOUS
Status: DISCONTINUED | OUTPATIENT
Start: 2025-08-08 | End: 2025-08-09

## 2025-08-08 RX ORDER — CYCLOBENZAPRINE HCL 10 MG
10 TABLET ORAL 3 TIMES DAILY PRN
Status: DISCONTINUED | OUTPATIENT
Start: 2025-08-08 | End: 2025-08-10 | Stop reason: HOSPADM

## 2025-08-08 RX ADMIN — OXYCODONE HYDROCHLORIDE 10 MG: 10 TABLET ORAL at 22:19

## 2025-08-08 RX ADMIN — SERTRALINE 25 MG: 50 TABLET, FILM COATED ORAL at 05:13

## 2025-08-08 RX ADMIN — ENOXAPARIN SODIUM 80 MG: 100 INJECTION SUBCUTANEOUS at 15:53

## 2025-08-08 RX ADMIN — HYDROMORPHONE HYDROCHLORIDE 0.5 MG: 1 INJECTION, SOLUTION INTRAMUSCULAR; INTRAVENOUS; SUBCUTANEOUS at 23:40

## 2025-08-08 RX ADMIN — GADOTERIDOL 18 ML: 279.3 INJECTION, SOLUTION INTRAVENOUS at 12:22

## 2025-08-08 ASSESSMENT — ENCOUNTER SYMPTOMS
NAUSEA: 0
VOMITING: 0
NERVOUS/ANXIOUS: 0
SHORTNESS OF BREATH: 0
DEPRESSION: 0
WEAKNESS: 0
SENSORY CHANGE: 0
FOCAL WEAKNESS: 0

## 2025-08-08 ASSESSMENT — PAIN DESCRIPTION - PAIN TYPE
TYPE: ACUTE PAIN

## 2025-08-09 ENCOUNTER — APPOINTMENT (OUTPATIENT)
Dept: RADIOLOGY | Facility: MEDICAL CENTER | Age: 40
DRG: 064 | End: 2025-08-09
Attending: STUDENT IN AN ORGANIZED HEALTH CARE EDUCATION/TRAINING PROGRAM
Payer: COMMERCIAL

## 2025-08-09 PROCEDURE — 70450 CT HEAD/BRAIN W/O DYE: CPT

## 2025-08-09 PROCEDURE — 700102 HCHG RX REV CODE 250 W/ 637 OVERRIDE(OP)

## 2025-08-09 PROCEDURE — 99232 SBSQ HOSP IP/OBS MODERATE 35: CPT | Performed by: STUDENT IN AN ORGANIZED HEALTH CARE EDUCATION/TRAINING PROGRAM

## 2025-08-09 PROCEDURE — 700102 HCHG RX REV CODE 250 W/ 637 OVERRIDE(OP): Performed by: STUDENT IN AN ORGANIZED HEALTH CARE EDUCATION/TRAINING PROGRAM

## 2025-08-09 PROCEDURE — 700111 HCHG RX REV CODE 636 W/ 250 OVERRIDE (IP): Performed by: STUDENT IN AN ORGANIZED HEALTH CARE EDUCATION/TRAINING PROGRAM

## 2025-08-09 PROCEDURE — 770020 HCHG ROOM/CARE - TELE (206)

## 2025-08-09 PROCEDURE — A9270 NON-COVERED ITEM OR SERVICE: HCPCS

## 2025-08-09 PROCEDURE — 85730 THROMBOPLASTIN TIME PARTIAL: CPT

## 2025-08-09 PROCEDURE — A9270 NON-COVERED ITEM OR SERVICE: HCPCS | Performed by: STUDENT IN AN ORGANIZED HEALTH CARE EDUCATION/TRAINING PROGRAM

## 2025-08-09 PROCEDURE — 86147 CARDIOLIPIN ANTIBODY EA IG: CPT | Mod: 91

## 2025-08-09 PROCEDURE — 85610 PROTHROMBIN TIME: CPT

## 2025-08-09 PROCEDURE — 700111 HCHG RX REV CODE 636 W/ 250 OVERRIDE (IP): Mod: JZ

## 2025-08-09 PROCEDURE — 700101 HCHG RX REV CODE 250

## 2025-08-09 PROCEDURE — 36415 COLL VENOUS BLD VENIPUNCTURE: CPT

## 2025-08-09 PROCEDURE — 99232 SBSQ HOSP IP/OBS MODERATE 35: CPT | Performed by: PSYCHIATRY & NEUROLOGY

## 2025-08-09 PROCEDURE — 85613 RUSSELL VIPER VENOM DILUTED: CPT | Mod: 91

## 2025-08-09 PROCEDURE — 86146 BETA-2 GLYCOPROTEIN ANTIBODY: CPT | Mod: 91

## 2025-08-09 RX ORDER — OXYCODONE HYDROCHLORIDE 5 MG/1
5 TABLET ORAL
Refills: 0 | Status: DISCONTINUED | OUTPATIENT
Start: 2025-08-09 | End: 2025-08-10 | Stop reason: HOSPADM

## 2025-08-09 RX ADMIN — SERTRALINE 25 MG: 50 TABLET, FILM COATED ORAL at 05:19

## 2025-08-09 RX ADMIN — TRAZODONE HYDROCHLORIDE 150 MG: 100 TABLET ORAL at 23:33

## 2025-08-09 RX ADMIN — ENOXAPARIN SODIUM 80 MG: 100 INJECTION SUBCUTANEOUS at 18:43

## 2025-08-09 RX ADMIN — HYDROMORPHONE HYDROCHLORIDE 0.5 MG: 1 INJECTION, SOLUTION INTRAMUSCULAR; INTRAVENOUS; SUBCUTANEOUS at 05:19

## 2025-08-09 RX ADMIN — LIDOCAINE 2 PATCH: 4 PATCH TOPICAL at 05:20

## 2025-08-09 RX ADMIN — ENOXAPARIN SODIUM 80 MG: 100 INJECTION SUBCUTANEOUS at 05:18

## 2025-08-09 RX ADMIN — OXYCODONE 5 MG: 5 TABLET ORAL at 12:58

## 2025-08-09 RX ADMIN — OXYCODONE 5 MG: 5 TABLET ORAL at 18:42

## 2025-08-09 RX ADMIN — OXYCODONE 5 MG: 5 TABLET ORAL at 15:55

## 2025-08-09 RX ADMIN — OXYCODONE 5 MG: 5 TABLET ORAL at 23:25

## 2025-08-09 RX ADMIN — OXYCODONE HYDROCHLORIDE 10 MG: 10 TABLET ORAL at 03:39

## 2025-08-09 RX ADMIN — ACETAMINOPHEN 1000 MG: 500 TABLET ORAL at 23:33

## 2025-08-09 ASSESSMENT — PAIN DESCRIPTION - PAIN TYPE
TYPE: ACUTE PAIN

## 2025-08-09 ASSESSMENT — ENCOUNTER SYMPTOMS
NAUSEA: 0
MYALGIAS: 1
DEPRESSION: 0
SHORTNESS OF BREATH: 0
NERVOUS/ANXIOUS: 0
SENSORY CHANGE: 1
VOMITING: 0

## 2025-08-09 ASSESSMENT — FIBROSIS 4 INDEX: FIB4 SCORE: 1.020452014574711287

## 2025-08-10 ENCOUNTER — PHARMACY VISIT (OUTPATIENT)
Dept: PHARMACY | Facility: MEDICAL CENTER | Age: 40
End: 2025-08-10
Payer: COMMERCIAL

## 2025-08-10 VITALS
HEIGHT: 72 IN | HEART RATE: 57 BPM | WEIGHT: 181.88 LBS | TEMPERATURE: 97.4 F | BODY MASS INDEX: 24.63 KG/M2 | DIASTOLIC BLOOD PRESSURE: 63 MMHG | RESPIRATION RATE: 17 BRPM | SYSTOLIC BLOOD PRESSURE: 130 MMHG | OXYGEN SATURATION: 95 %

## 2025-08-10 PROCEDURE — 99232 SBSQ HOSP IP/OBS MODERATE 35: CPT | Performed by: PSYCHIATRY & NEUROLOGY

## 2025-08-10 PROCEDURE — 700102 HCHG RX REV CODE 250 W/ 637 OVERRIDE(OP): Performed by: STUDENT IN AN ORGANIZED HEALTH CARE EDUCATION/TRAINING PROGRAM

## 2025-08-10 PROCEDURE — 700111 HCHG RX REV CODE 636 W/ 250 OVERRIDE (IP): Performed by: STUDENT IN AN ORGANIZED HEALTH CARE EDUCATION/TRAINING PROGRAM

## 2025-08-10 PROCEDURE — RXMED WILLOW AMBULATORY MEDICATION CHARGE: Performed by: STUDENT IN AN ORGANIZED HEALTH CARE EDUCATION/TRAINING PROGRAM

## 2025-08-10 PROCEDURE — A9270 NON-COVERED ITEM OR SERVICE: HCPCS | Performed by: STUDENT IN AN ORGANIZED HEALTH CARE EDUCATION/TRAINING PROGRAM

## 2025-08-10 PROCEDURE — 700101 HCHG RX REV CODE 250

## 2025-08-10 PROCEDURE — 99239 HOSP IP/OBS DSCHRG MGMT >30: CPT | Performed by: STUDENT IN AN ORGANIZED HEALTH CARE EDUCATION/TRAINING PROGRAM

## 2025-08-10 RX ORDER — ECHINACEA PURPUREA EXTRACT 125 MG
2 TABLET ORAL
Status: DISCONTINUED | OUTPATIENT
Start: 2025-08-10 | End: 2025-08-10 | Stop reason: HOSPADM

## 2025-08-10 RX ORDER — ECHINACEA PURPUREA EXTRACT 125 MG
2 TABLET ORAL
COMMUNITY
Start: 2025-08-10

## 2025-08-10 RX ORDER — OXYCODONE HYDROCHLORIDE 5 MG/1
5 TABLET ORAL EVERY 4 HOURS PRN
Qty: 10 TABLET | Refills: 0 | Status: SHIPPED | OUTPATIENT
Start: 2025-08-10 | End: 2025-08-15

## 2025-08-10 RX ADMIN — SERTRALINE 25 MG: 50 TABLET, FILM COATED ORAL at 06:00

## 2025-08-10 RX ADMIN — ENOXAPARIN SODIUM 80 MG: 100 INJECTION SUBCUTANEOUS at 06:00

## 2025-08-10 RX ADMIN — OXYCODONE 5 MG: 5 TABLET ORAL at 03:28

## 2025-08-10 RX ADMIN — LIDOCAINE 2 PATCH: 4 PATCH TOPICAL at 06:00

## 2025-08-10 ASSESSMENT — FIBROSIS 4 INDEX: FIB4 SCORE: 1.020452014574711287

## 2025-08-10 ASSESSMENT — PAIN DESCRIPTION - PAIN TYPE
TYPE: ACUTE PAIN
TYPE: ACUTE PAIN

## 2025-08-12 LAB
B2 GLYCOPROT1 IGG SERPL IA-ACNC: <10 SGU
B2 GLYCOPROT1 IGM SERPL IA-ACNC: <10 SMU
CARDIOLIPIN IGG SER IA-ACNC: <10 GPL
CARDIOLIPIN IGM SER IA-ACNC: <10 MPL

## 2025-08-13 LAB
APTT SCREEN TO CONFIRM RATIO: 1.19 {RATIO}
CONFIRM DRVVT STA-STACLOT: NORMAL S
DRVVT SCREEN TO CONFIRM RATIO: 1.04 {RATIO}
DRVVT SCREEN TO CONFIRM RATIO: NORMAL {RATIO}
DRVVT/DRVVT CFM P DOAC NEUT NORM PPP-RTO: NORMAL {RATIO}
HEPARIN NT PPP QL: NORMAL
LA 3 SCREEN W REFLEX-IMP: NORMAL
LMW HEPARIN IND PLT AB SER QL: NORMAL
MIXING DRVVT: NORMAL S
PROTHROMBIN TIME: 14.6 S (ref 12–15.5)
SCREEN APTT: NORMAL S
TESTING SUMMARY NL11779: NORMAL
THROMBIN TIME: NORMAL S

## 2025-08-18 ENCOUNTER — TELEPHONE (OUTPATIENT)
Dept: NEUROLOGY | Facility: MEDICAL CENTER | Age: 40
End: 2025-08-18
Payer: COMMERCIAL

## (undated) DEVICE — SET LEADWIRE 5 LEAD BEDSIDE DISPOSABLE ECG (1SET OF 5/EA)

## (undated) DEVICE — MIDAS LUBRICATOR DIFFUSER PACK (4EA/CA)

## (undated) DEVICE — TUBING C&T SET FLYING LEADS DRAIN TUBING (10EA/BX)

## (undated) DEVICE — DRAPE STRLE REG TOWEL 18X24 - (10/BX 4BX/CA)"

## (undated) DEVICE — CHLORAPREP 26 ML APPLICATOR - ORANGE TINT(25/CA)

## (undated) DEVICE — GLOVE SZ 7.5 BIOGEL PI MICRO - PF LF (50PR/BX)

## (undated) DEVICE — PIN HEAD MAYFIELD DISP. (3EA/PK 12PK/BX)

## (undated) DEVICE — FORCEPS IRRIGATING 8 X 1.5MM (5EA/BX)

## (undated) DEVICE — KIT EVACUATER 3 SPRING PVC LF 1/8 DRAIN SIZE (10EA/CA)"

## (undated) DEVICE — PATTIES SURG X-RAYCOTTONOID - 1/2 X 3 IN (200/CA)

## (undated) DEVICE — SENSOR OXIMETER ADULT SPO2 RD SET (20EA/BX)

## (undated) DEVICE — SUTURE GENERAL

## (undated) DEVICE — LIGHT SOURCE MIS 12FT

## (undated) DEVICE — GOWN WARMING STANDARD FLEX - (30/CA)

## (undated) DEVICE — BIT DRILL 2.4MM NAVIGATED

## (undated) DEVICE — DRAPE 36X28IN RAD CARM BND BG - (25/CA) O

## (undated) DEVICE — NEEDLE SPINAL NON-SAFETY 18 GA X 3 IN (25EA/BX)

## (undated) DEVICE — NEEDLE NON SAFETY HYPO 22 GA X 1 1/2 IN (100/BX)

## (undated) DEVICE — LACTATED RINGERS INJ 1000 ML - (14EA/CA 60CA/PF)

## (undated) DEVICE — SURGIFOAM (SIZE 100) - (6EA/CA)

## (undated) DEVICE — TOOL MR8 15CM MATCH HEAD 2.2MM DIAMETER (1/EA)

## (undated) DEVICE — DRAPE LARGE 3 QUARTER - (20/CA)

## (undated) DEVICE — SUTURE ETHILON 2-0 FSLX 30 (36PK/BX)"

## (undated) DEVICE — CATHETER IV 14 GA X 2 ---SURG.& SDS ONLY---(200EA/CA)

## (undated) DEVICE — GLOVE BIOGEL PI ORTHO SZ 7.5 PF LF (40PR/BX)

## (undated) DEVICE — SHEET PEDIATRIC LAPAROTOMY - (10/CA)

## (undated) DEVICE — Device

## (undated) DEVICE — ELECTRODE DUAL RETURN W/ CORD - (50/PK)

## (undated) DEVICE — SUCTION INSTRUMENT YANKAUER BULBOUS TIP W/O VENT (50EA/CA)

## (undated) DEVICE — SODIUM CHL IRRIGATION 0.9% 1000ML (12EA/CA)

## (undated) DEVICE — GLOVE BIOGEL PI INDICATOR SZ 7.5 SURGICAL PF LF -(50/BX 4BX/CA)

## (undated) DEVICE — SPHERE NAVIGATION STEALTH (5EA/TY 12TY/PK)

## (undated) DEVICE — GLOVE PROTEXIS PI MICRO SZ 8.5 (200PR/CA)

## (undated) DEVICE — SUTURE 1 VICRYL PLUS CT-1 - 18 INCH (12/BX)

## (undated) DEVICE — KIT SURGIFLO W/OUT THROMBIN - (6EA/CA)

## (undated) DEVICE — LACTATED RINGERS INJ. 500 ML - (24EA/CA)

## (undated) DEVICE — COVER LIGHT HANDLE ALC PLUS DISP (18EA/BX)

## (undated) DEVICE — TRAY CATHETER FOLEY URINE METER W/STATLOCK 350ML (10EA/CA)

## (undated) DEVICE — PACK NEURO - (2EA/CA)

## (undated) DEVICE — TOOL MR8 14CM MATCH HD SYM-TRI 3MM DIAMETER (1/EA)

## (undated) DEVICE — SLEEVE, VASO, THIGH, MED

## (undated) DEVICE — SET EXTENSION WITH 2 PORTS (48EA/CA) ***PART #2C8610 IS A SUBSTITUTE*****

## (undated) DEVICE — BLADE SURGICAL CLIPPER - (50EA/CA)

## (undated) DEVICE — TRAY SRGPRP PVP IOD WT PRP - (20/CA)

## (undated) DEVICE — SUTURE 2-0 VICRYL CT-2  8 X 18 INCH (12EA/BX)

## (undated) DEVICE — GLOVE BIOGEL ECLIPSE PF LATEX SIZE 8.5 (50PR/BX)

## (undated) DEVICE — SUTURE 3-0 VICRYL PLUS SH - 8X 18 INCH (12/BX)

## (undated) DEVICE — COVER MAYO STAND X-LG - (22EA/CA)

## (undated) DEVICE — BONE MILL BM210

## (undated) DEVICE — SUTURE 2-0 ETHILON FS - (36/BX) 18 INCH

## (undated) DEVICE — CANISTER SUCTION 3000ML MECHANICAL FILTER AUTO SHUTOFF MEDI-VAC NONSTERILE LF DISP  (40EA/CA)

## (undated) DEVICE — TUBING CLEARLINK DUO-VENT - C-FLO (48EA/CA)

## (undated) DEVICE — BOVIE BLADE COATED &INSULATED - 25/PK

## (undated) DEVICE — NEEDLE, DISP 16G X 1-1/2 BLUNT

## (undated) DEVICE — GLOVESZ 8.5 BIOGEL PI MICRO - PF LF (50PR/BX)

## (undated) DEVICE — SYRINGE NON SAFETY 10 CC 20 GA X 1-1/2 IN (100/BX 4BX/CA)

## (undated) DEVICE — GLOVE SZ 7 BIOGEL PI MICRO - PF LF (50PR/BX 4BX/CA)

## (undated) DEVICE — DRESSING POST OP BORDER 4INX6IN AG (70/CA)